# Patient Record
Sex: FEMALE | Race: BLACK OR AFRICAN AMERICAN | Employment: UNEMPLOYED | URBAN - METROPOLITAN AREA
[De-identification: names, ages, dates, MRNs, and addresses within clinical notes are randomized per-mention and may not be internally consistent; named-entity substitution may affect disease eponyms.]

---

## 2018-11-08 ENCOUNTER — APPOINTMENT (OUTPATIENT)
Dept: CT IMAGING | Age: 63
DRG: 026 | End: 2018-11-08
Attending: EMERGENCY MEDICINE
Payer: MEDICARE

## 2018-11-08 ENCOUNTER — ANESTHESIA (OUTPATIENT)
Dept: SURGERY | Age: 63
DRG: 026 | End: 2018-11-08
Payer: MEDICARE

## 2018-11-08 ENCOUNTER — HOSPITAL ENCOUNTER (INPATIENT)
Age: 63
LOS: 18 days | Discharge: NURSING, REHAB OR LONG-TERM CARE | DRG: 026 | End: 2018-11-26
Attending: EMERGENCY MEDICINE | Admitting: FAMILY MEDICINE
Payer: MEDICARE

## 2018-11-08 ENCOUNTER — APPOINTMENT (OUTPATIENT)
Dept: GENERAL RADIOLOGY | Age: 63
DRG: 026 | End: 2018-11-08
Attending: EMERGENCY MEDICINE
Payer: MEDICARE

## 2018-11-08 ENCOUNTER — ANESTHESIA EVENT (OUTPATIENT)
Dept: SURGERY | Age: 63
DRG: 026 | End: 2018-11-08
Payer: MEDICARE

## 2018-11-08 ENCOUNTER — APPOINTMENT (OUTPATIENT)
Dept: GENERAL RADIOLOGY | Age: 63
DRG: 026 | End: 2018-11-08
Attending: NEUROLOGICAL SURGERY
Payer: MEDICARE

## 2018-11-08 DIAGNOSIS — T85.09XA OBSTRUCTED VP SHUNT, INITIAL ENCOUNTER (HCC): ICD-10-CM

## 2018-11-08 DIAGNOSIS — R41.0 CONFUSION: ICD-10-CM

## 2018-11-08 DIAGNOSIS — D49.6 BRAIN TUMOR (HCC): ICD-10-CM

## 2018-11-08 DIAGNOSIS — R26.0 ATAXIC GAIT: ICD-10-CM

## 2018-11-08 DIAGNOSIS — R53.83 FATIGUE, UNSPECIFIED TYPE: ICD-10-CM

## 2018-11-08 DIAGNOSIS — T85.09XS MALFUNCTION OF VENTRICULOPERITONEAL SHUNT, SEQUELA: Primary | ICD-10-CM

## 2018-11-08 DIAGNOSIS — T85.09XA MALFUNCTION OF VENTRICULOPERITONEAL SHUNT, INITIAL ENCOUNTER (HCC): ICD-10-CM

## 2018-11-08 PROBLEM — N17.9 AKI (ACUTE KIDNEY INJURY) (HCC): Status: ACTIVE | Noted: 2018-11-08

## 2018-11-08 PROBLEM — R41.82 ALTERED MENTAL STATUS: Status: ACTIVE | Noted: 2018-11-08

## 2018-11-08 LAB
ALBUMIN SERPL-MCNC: 2.9 G/DL (ref 3.2–4.6)
ALBUMIN/GLOB SERPL: 0.5 {RATIO} (ref 1.2–3.5)
ALP SERPL-CCNC: 147 U/L (ref 50–136)
ALT SERPL-CCNC: 83 U/L (ref 12–65)
ANION GAP SERPL CALC-SCNC: 9 MMOL/L (ref 7–16)
APPEARANCE FLD: CLEAR
AST SERPL-CCNC: 91 U/L (ref 15–37)
ATRIAL RATE: 66 BPM
BASOPHILS # BLD: 0.1 K/UL (ref 0–0.2)
BASOPHILS NFR BLD: 1 % (ref 0–2)
BILIRUB SERPL-MCNC: 0.5 MG/DL (ref 0.2–1.1)
BNP SERPL-MCNC: 16 PG/ML
BUN SERPL-MCNC: 14 MG/DL (ref 8–23)
CALCIUM SERPL-MCNC: 9.2 MG/DL (ref 8.3–10.4)
CALCULATED P AXIS, ECG09: 46 DEGREES
CALCULATED R AXIS, ECG10: 33 DEGREES
CALCULATED T AXIS, ECG11: 34 DEGREES
CHLORIDE SERPL-SCNC: 106 MMOL/L (ref 98–107)
CO2 SERPL-SCNC: 25 MMOL/L (ref 21–32)
COLOR FLD: COLORLESS
CREAT SERPL-MCNC: 1.83 MG/DL (ref 0.6–1)
DIAGNOSIS, 93000: NORMAL
DIFFERENTIAL METHOD BLD: ABNORMAL
EOSINOPHIL # BLD: 0.1 K/UL (ref 0–0.8)
EOSINOPHIL NFR BLD: 1 % (ref 0.5–7.8)
ERYTHROCYTE [DISTWIDTH] IN BLOOD BY AUTOMATED COUNT: 15.6 %
GLOBULIN SER CALC-MCNC: 5.8 G/DL (ref 2.3–3.5)
GLUCOSE CSF-MCNC: 49 MG/DL (ref 40–70)
GLUCOSE SERPL-MCNC: 99 MG/DL (ref 65–100)
HCT VFR BLD AUTO: 30.9 % (ref 35.8–46.3)
HGB BLD-MCNC: 9.8 G/DL (ref 11.7–15.4)
IMM GRANULOCYTES # BLD: 0.1 K/UL (ref 0–0.5)
IMM GRANULOCYTES NFR BLD AUTO: 1 % (ref 0–5)
INR PPP: 1.1
LACTATE BLD-SCNC: 1.13 MMOL/L (ref 0.5–1.9)
LIPASE SERPL-CCNC: 173 U/L (ref 73–393)
LYMPHOCYTES # BLD: 1.1 K/UL (ref 0.5–4.6)
LYMPHOCYTES NFR BLD: 11 % (ref 13–44)
LYMPHOCYTES NFR FLD: 51 %
MCH RBC QN AUTO: 26.6 PG (ref 26.1–32.9)
MCHC RBC AUTO-ENTMCNC: 31.7 G/DL (ref 31.4–35)
MCV RBC AUTO: 84 FL (ref 79.6–97.8)
MONOCYTES # BLD: 0.6 K/UL (ref 0.1–1.3)
MONOCYTES NFR BLD: 6 % (ref 4–12)
MONOS+MACROS NFR FLD: 43 %
NEUTROPHILS NFR FLD: 6 %
NEUTS SEG # BLD: 8.5 K/UL (ref 1.7–8.2)
NEUTS SEG NFR BLD: 82 % (ref 43–78)
NRBC # BLD: 0 K/UL (ref 0–0.2)
NUC CELL # FLD: 16 /CU MM
P-R INTERVAL, ECG05: 172 MS
PLATELET # BLD AUTO: 503 K/UL (ref 150–450)
PMV BLD AUTO: 11.5 FL (ref 9.4–12.3)
POTASSIUM SERPL-SCNC: 4.1 MMOL/L (ref 3.5–5.1)
PROCALCITONIN SERPL-MCNC: <0.1 NG/ML
PROT CSF-MCNC: 16 MG/DL (ref 15–45)
PROT SERPL-MCNC: 8.7 G/DL (ref 6.3–8.2)
PROTHROMBIN TIME: 13.7 SEC (ref 11.5–14.5)
Q-T INTERVAL, ECG07: 406 MS
QRS DURATION, ECG06: 84 MS
QTC CALCULATION (BEZET), ECG08: 425 MS
RBC # BLD AUTO: 3.68 M/UL (ref 4.05–5.2)
RBC # FLD: 43 /CU MM
SODIUM SERPL-SCNC: 140 MMOL/L (ref 136–145)
SPECIMEN SOURCE FLD: NORMAL
TROPONIN I BLD-MCNC: 0.01 NG/ML (ref 0.02–0.05)
TUBE # CSF: 2
TUBE # CSF: 3
VENTRICULAR RATE, ECG03: 66 BPM
WBC # BLD AUTO: 10.4 K/UL (ref 4.3–11.1)

## 2018-11-08 PROCEDURE — 77030020263 HC SOL INJ SOD CL0.9% LFCR 1000ML

## 2018-11-08 PROCEDURE — 87070 CULTURE OTHR SPECIMN AEROBIC: CPT

## 2018-11-08 PROCEDURE — 77030030163 HC BN WAX J&J -A: Performed by: NEUROLOGICAL SURGERY

## 2018-11-08 PROCEDURE — 74011000258 HC RX REV CODE- 258

## 2018-11-08 PROCEDURE — 70360 X-RAY EXAM OF NECK: CPT

## 2018-11-08 PROCEDURE — 76060000036 HC ANESTHESIA 2.5 TO 3 HR: Performed by: NEUROLOGICAL SURGERY

## 2018-11-08 PROCEDURE — 74011250636 HC RX REV CODE- 250/636: Performed by: NEUROLOGICAL SURGERY

## 2018-11-08 PROCEDURE — 74011250636 HC RX REV CODE- 250/636: Performed by: EMERGENCY MEDICINE

## 2018-11-08 PROCEDURE — 77030008477 HC STYL SATN SLP COVD -A: Performed by: ANESTHESIOLOGY

## 2018-11-08 PROCEDURE — 70450 CT HEAD/BRAIN W/O DYE: CPT

## 2018-11-08 PROCEDURE — 80053 COMPREHEN METABOLIC PANEL: CPT

## 2018-11-08 PROCEDURE — 76010000172 HC OR TIME 2.5 TO 3 HR INTENSV-TIER 1: Performed by: NEUROLOGICAL SURGERY

## 2018-11-08 PROCEDURE — 009630Z DRAINAGE OF CEREBRAL VENTRICLE WITH DRAINAGE DEVICE, PERCUTANEOUS APPROACH: ICD-10-PCS | Performed by: NEUROLOGICAL SURGERY

## 2018-11-08 PROCEDURE — 77030039267 HC ADH SKN EXOFIN S2SG -B: Performed by: NEUROLOGICAL SURGERY

## 2018-11-08 PROCEDURE — 77030016570 HC BLNKT BAIR HGGR 3M -B: Performed by: ANESTHESIOLOGY

## 2018-11-08 PROCEDURE — 83605 ASSAY OF LACTIC ACID: CPT

## 2018-11-08 PROCEDURE — 85025 COMPLETE CBC W/AUTO DIFF WBC: CPT

## 2018-11-08 PROCEDURE — 51701 INSERT BLADDER CATHETER: CPT | Performed by: EMERGENCY MEDICINE

## 2018-11-08 PROCEDURE — 83690 ASSAY OF LIPASE: CPT

## 2018-11-08 PROCEDURE — 84484 ASSAY OF TROPONIN QUANT: CPT

## 2018-11-08 PROCEDURE — 74011250636 HC RX REV CODE- 250/636: Performed by: FAMILY MEDICINE

## 2018-11-08 PROCEDURE — 74011250636 HC RX REV CODE- 250/636

## 2018-11-08 PROCEDURE — 77030019908 HC STETH ESOPH SIMS -A: Performed by: ANESTHESIOLOGY

## 2018-11-08 PROCEDURE — C1729 CATH, DRAINAGE: HCPCS

## 2018-11-08 PROCEDURE — 74011000258 HC RX REV CODE- 258: Performed by: NEUROLOGICAL SURGERY

## 2018-11-08 PROCEDURE — 87040 BLOOD CULTURE FOR BACTERIA: CPT

## 2018-11-08 PROCEDURE — 83880 ASSAY OF NATRIURETIC PEPTIDE: CPT

## 2018-11-08 PROCEDURE — 85610 PROTHROMBIN TIME: CPT

## 2018-11-08 PROCEDURE — 77030012393 HC DRN CSF INLC -C: Performed by: NEUROLOGICAL SURGERY

## 2018-11-08 PROCEDURE — 77030020782 HC GWN BAIR PAWS FLX 3M -B: Performed by: ANESTHESIOLOGY

## 2018-11-08 PROCEDURE — 74011000250 HC RX REV CODE- 250: Performed by: NEUROLOGICAL SURGERY

## 2018-11-08 PROCEDURE — 77030025420 HC BUR NEUR TPS STRY -C: Performed by: NEUROLOGICAL SURGERY

## 2018-11-08 PROCEDURE — 77030002916 HC SUT ETHLN J&J -A: Performed by: NEUROLOGICAL SURGERY

## 2018-11-08 PROCEDURE — 77030008703 HC TU ET UNCUF COVD -A: Performed by: ANESTHESIOLOGY

## 2018-11-08 PROCEDURE — 99284 EMERGENCY DEPT VISIT MOD MDM: CPT | Performed by: EMERGENCY MEDICINE

## 2018-11-08 PROCEDURE — 77030008468 HC STPLR SKN VISIS TELE -A: Performed by: NEUROLOGICAL SURGERY

## 2018-11-08 PROCEDURE — 89050 BODY FLUID CELL COUNT: CPT

## 2018-11-08 PROCEDURE — 77030019605

## 2018-11-08 PROCEDURE — 77030039425 HC BLD LARYNG TRULITE DISP TELE -A: Performed by: ANESTHESIOLOGY

## 2018-11-08 PROCEDURE — 76210000063 HC OR PH I REC FIRST 0.5 HR: Performed by: NEUROLOGICAL SURGERY

## 2018-11-08 PROCEDURE — 77030014007 HC SPNG HEMSTAT J&J -B: Performed by: NEUROLOGICAL SURGERY

## 2018-11-08 PROCEDURE — 77030003029 HC SUT VCRL J&J -B: Performed by: NEUROLOGICAL SURGERY

## 2018-11-08 PROCEDURE — 77030038600 HC TU BPLR IRR DISP STRY -B: Performed by: NEUROLOGICAL SURGERY

## 2018-11-08 PROCEDURE — 49082 ABD PARACENTESIS: CPT | Performed by: EMERGENCY MEDICINE

## 2018-11-08 PROCEDURE — C1729 CATH, DRAINAGE: HCPCS | Performed by: NEUROLOGICAL SURGERY

## 2018-11-08 PROCEDURE — 84157 ASSAY OF PROTEIN OTHER: CPT

## 2018-11-08 PROCEDURE — 00P60JZ REMOVAL OF SYNTHETIC SUBSTITUTE FROM CEREBRAL VENTRICLE, OPEN APPROACH: ICD-10-PCS | Performed by: NEUROLOGICAL SURGERY

## 2018-11-08 PROCEDURE — 93005 ELECTROCARDIOGRAM TRACING: CPT | Performed by: EMERGENCY MEDICINE

## 2018-11-08 PROCEDURE — 74011250637 HC RX REV CODE- 250/637: Performed by: FAMILY MEDICINE

## 2018-11-08 PROCEDURE — 82945 GLUCOSE OTHER FLUID: CPT

## 2018-11-08 PROCEDURE — 81003 URINALYSIS AUTO W/O SCOPE: CPT | Performed by: EMERGENCY MEDICINE

## 2018-11-08 PROCEDURE — 77030018673: Performed by: NEUROLOGICAL SURGERY

## 2018-11-08 PROCEDURE — 77030020788: Performed by: NEUROLOGICAL SURGERY

## 2018-11-08 PROCEDURE — 74011000250 HC RX REV CODE- 250

## 2018-11-08 PROCEDURE — 77030018719 HC DRSG PTCH ANTIMIC J&J -A: Performed by: NEUROLOGICAL SURGERY

## 2018-11-08 PROCEDURE — 65620000000 HC RM CCU GENERAL

## 2018-11-08 PROCEDURE — 77030011943

## 2018-11-08 PROCEDURE — 84145 PROCALCITONIN (PCT): CPT

## 2018-11-08 PROCEDURE — 96360 HYDRATION IV INFUSION INIT: CPT | Performed by: EMERGENCY MEDICINE

## 2018-11-08 RX ORDER — DONEPEZIL HYDROCHLORIDE 10 MG/1
10 TABLET, FILM COATED ORAL
COMMUNITY

## 2018-11-08 RX ORDER — SODIUM CHLORIDE, SODIUM LACTATE, POTASSIUM CHLORIDE, CALCIUM CHLORIDE 600; 310; 30; 20 MG/100ML; MG/100ML; MG/100ML; MG/100ML
INJECTION, SOLUTION INTRAVENOUS
Status: DISCONTINUED | OUTPATIENT
Start: 2018-11-08 | End: 2018-11-08 | Stop reason: HOSPADM

## 2018-11-08 RX ORDER — DONEPEZIL HYDROCHLORIDE 5 MG/1
10 TABLET, FILM COATED ORAL
Status: DISCONTINUED | OUTPATIENT
Start: 2018-11-08 | End: 2018-11-26 | Stop reason: HOSPADM

## 2018-11-08 RX ORDER — SODIUM CHLORIDE 0.9 % (FLUSH) 0.9 %
5-10 SYRINGE (ML) INJECTION AS NEEDED
Status: DISCONTINUED | OUTPATIENT
Start: 2018-11-08 | End: 2018-11-26 | Stop reason: HOSPADM

## 2018-11-08 RX ORDER — ROSUVASTATIN CALCIUM 10 MG/1
10 TABLET, COATED ORAL
COMMUNITY

## 2018-11-08 RX ORDER — NEOSTIGMINE METHYLSULFATE 1 MG/ML
INJECTION INTRAVENOUS AS NEEDED
Status: DISCONTINUED | OUTPATIENT
Start: 2018-11-08 | End: 2018-11-08 | Stop reason: HOSPADM

## 2018-11-08 RX ORDER — MULTIVIT WITH MINERALS/HERBS
1 TABLET ORAL DAILY
COMMUNITY

## 2018-11-08 RX ORDER — HYDRALAZINE HYDROCHLORIDE 20 MG/ML
10 INJECTION INTRAMUSCULAR; INTRAVENOUS
Status: DISCONTINUED | OUTPATIENT
Start: 2018-11-08 | End: 2018-11-26 | Stop reason: HOSPADM

## 2018-11-08 RX ORDER — DEXAMETHASONE SODIUM PHOSPHATE 4 MG/ML
INJECTION, SOLUTION INTRA-ARTICULAR; INTRALESIONAL; INTRAMUSCULAR; INTRAVENOUS; SOFT TISSUE AS NEEDED
Status: DISCONTINUED | OUTPATIENT
Start: 2018-11-08 | End: 2018-11-08 | Stop reason: HOSPADM

## 2018-11-08 RX ORDER — MEMANTINE HYDROCHLORIDE 5 MG/1
5 TABLET ORAL 2 TIMES DAILY
COMMUNITY

## 2018-11-08 RX ORDER — VANCOMYCIN/0.9 % SOD CHLORIDE 1.5G/250ML
1500 PLASTIC BAG, INJECTION (ML) INTRAVENOUS EVERY 24 HOURS
Status: DISCONTINUED | OUTPATIENT
Start: 2018-11-09 | End: 2018-11-11

## 2018-11-08 RX ORDER — PROPOFOL 10 MG/ML
INJECTION, EMULSION INTRAVENOUS AS NEEDED
Status: DISCONTINUED | OUTPATIENT
Start: 2018-11-08 | End: 2018-11-08 | Stop reason: HOSPADM

## 2018-11-08 RX ORDER — METRONIDAZOLE 500 MG/100ML
500 INJECTION, SOLUTION INTRAVENOUS ONCE
Status: COMPLETED | OUTPATIENT
Start: 2018-11-08 | End: 2018-11-08

## 2018-11-08 RX ORDER — ROCURONIUM BROMIDE 10 MG/ML
INJECTION, SOLUTION INTRAVENOUS AS NEEDED
Status: DISCONTINUED | OUTPATIENT
Start: 2018-11-08 | End: 2018-11-08 | Stop reason: HOSPADM

## 2018-11-08 RX ORDER — VANCOMYCIN/0.9 % SOD CHLORIDE 1.5G/250ML
1500 PLASTIC BAG, INJECTION (ML) INTRAVENOUS ONCE
Status: COMPLETED | OUTPATIENT
Start: 2018-11-08 | End: 2018-11-08

## 2018-11-08 RX ORDER — FERROUS SULFATE, DRIED 160(50) MG
1 TABLET, EXTENDED RELEASE ORAL
Status: DISCONTINUED | OUTPATIENT
Start: 2018-11-09 | End: 2018-11-26 | Stop reason: HOSPADM

## 2018-11-08 RX ORDER — MORPHINE SULFATE 2 MG/ML
1 INJECTION, SOLUTION INTRAMUSCULAR; INTRAVENOUS
Status: DISCONTINUED | OUTPATIENT
Start: 2018-11-08 | End: 2018-11-26 | Stop reason: HOSPADM

## 2018-11-08 RX ORDER — POTASSIUM CHLORIDE 20 MEQ/1
20 TABLET, EXTENDED RELEASE ORAL 2 TIMES DAILY
Status: DISCONTINUED | OUTPATIENT
Start: 2018-11-08 | End: 2018-11-26 | Stop reason: HOSPADM

## 2018-11-08 RX ORDER — EPHEDRINE SULFATE 50 MG/ML
INJECTION, SOLUTION INTRAVENOUS AS NEEDED
Status: DISCONTINUED | OUTPATIENT
Start: 2018-11-08 | End: 2018-11-08 | Stop reason: HOSPADM

## 2018-11-08 RX ORDER — FOLIC ACID 1 MG/1
0.5 TABLET ORAL DAILY
Status: DISCONTINUED | OUTPATIENT
Start: 2018-11-08 | End: 2018-11-26 | Stop reason: HOSPADM

## 2018-11-08 RX ORDER — FENTANYL CITRATE 50 UG/ML
INJECTION, SOLUTION INTRAMUSCULAR; INTRAVENOUS AS NEEDED
Status: DISCONTINUED | OUTPATIENT
Start: 2018-11-08 | End: 2018-11-08 | Stop reason: HOSPADM

## 2018-11-08 RX ORDER — GLYCOPYRROLATE 0.2 MG/ML
INJECTION INTRAMUSCULAR; INTRAVENOUS AS NEEDED
Status: DISCONTINUED | OUTPATIENT
Start: 2018-11-08 | End: 2018-11-08 | Stop reason: HOSPADM

## 2018-11-08 RX ORDER — LIDOCAINE HYDROCHLORIDE AND EPINEPHRINE 10; 10 MG/ML; UG/ML
INJECTION, SOLUTION INFILTRATION; PERINEURAL AS NEEDED
Status: DISCONTINUED | OUTPATIENT
Start: 2018-11-08 | End: 2018-11-08 | Stop reason: HOSPADM

## 2018-11-08 RX ORDER — METRONIDAZOLE 500 MG/100ML
500 INJECTION, SOLUTION INTRAVENOUS EVERY 8 HOURS
Status: DISCONTINUED | OUTPATIENT
Start: 2018-11-08 | End: 2018-11-09

## 2018-11-08 RX ORDER — SODIUM CHLORIDE 0.9 % (FLUSH) 0.9 %
5-10 SYRINGE (ML) INJECTION EVERY 8 HOURS
Status: DISCONTINUED | OUTPATIENT
Start: 2018-11-08 | End: 2018-11-26 | Stop reason: HOSPADM

## 2018-11-08 RX ORDER — SODIUM CHLORIDE 9 MG/ML
100 INJECTION, SOLUTION INTRAVENOUS CONTINUOUS
Status: DISCONTINUED | OUTPATIENT
Start: 2018-11-08 | End: 2018-11-12

## 2018-11-08 RX ORDER — LANOLIN ALCOHOL/MO/W.PET/CERES
400 CREAM (GRAM) TOPICAL DAILY
Status: DISCONTINUED | OUTPATIENT
Start: 2018-11-08 | End: 2018-11-08

## 2018-11-08 RX ORDER — MEMANTINE HYDROCHLORIDE 5 MG/1
5 TABLET ORAL 2 TIMES DAILY
Status: DISCONTINUED | OUTPATIENT
Start: 2018-11-08 | End: 2018-11-26 | Stop reason: HOSPADM

## 2018-11-08 RX ORDER — LANOLIN ALCOHOL/MO/W.PET/CERES
400 CREAM (GRAM) TOPICAL DAILY
COMMUNITY

## 2018-11-08 RX ORDER — LIDOCAINE HYDROCHLORIDE 20 MG/ML
INJECTION, SOLUTION EPIDURAL; INFILTRATION; INTRACAUDAL; PERINEURAL AS NEEDED
Status: DISCONTINUED | OUTPATIENT
Start: 2018-11-08 | End: 2018-11-08 | Stop reason: HOSPADM

## 2018-11-08 RX ORDER — ROSUVASTATIN CALCIUM 5 MG/1
10 TABLET, COATED ORAL
Status: DISCONTINUED | OUTPATIENT
Start: 2018-11-08 | End: 2018-11-26 | Stop reason: HOSPADM

## 2018-11-08 RX ORDER — BACITRACIN 50000 [IU]/1
INJECTION, POWDER, FOR SOLUTION INTRAMUSCULAR AS NEEDED
Status: DISCONTINUED | OUTPATIENT
Start: 2018-11-08 | End: 2018-11-08 | Stop reason: HOSPADM

## 2018-11-08 RX ORDER — ACETAMINOPHEN 325 MG/1
650 TABLET ORAL
Status: DISCONTINUED | OUTPATIENT
Start: 2018-11-08 | End: 2018-11-26 | Stop reason: HOSPADM

## 2018-11-08 RX ORDER — HYDROCODONE BITARTRATE AND ACETAMINOPHEN 5; 325 MG/1; MG/1
1 TABLET ORAL
Status: DISCONTINUED | OUTPATIENT
Start: 2018-11-08 | End: 2018-11-26 | Stop reason: HOSPADM

## 2018-11-08 RX ORDER — ASPIRIN 81 MG/1
81 TABLET ORAL DAILY
Status: DISCONTINUED | OUTPATIENT
Start: 2018-11-08 | End: 2018-11-08

## 2018-11-08 RX ORDER — FUROSEMIDE 20 MG/1
40 TABLET ORAL DAILY
Status: DISCONTINUED | OUTPATIENT
Start: 2018-11-08 | End: 2018-11-10

## 2018-11-08 RX ORDER — ONDANSETRON 2 MG/ML
INJECTION INTRAMUSCULAR; INTRAVENOUS AS NEEDED
Status: DISCONTINUED | OUTPATIENT
Start: 2018-11-08 | End: 2018-11-08 | Stop reason: HOSPADM

## 2018-11-08 RX ORDER — ONDANSETRON 2 MG/ML
4 INJECTION INTRAMUSCULAR; INTRAVENOUS
Status: DISCONTINUED | OUTPATIENT
Start: 2018-11-08 | End: 2018-11-26 | Stop reason: HOSPADM

## 2018-11-08 RX ORDER — VANCOMYCIN HYDROCHLORIDE 1 G/20ML
INJECTION, POWDER, LYOPHILIZED, FOR SOLUTION INTRAVENOUS AS NEEDED
Status: DISCONTINUED | OUTPATIENT
Start: 2018-11-08 | End: 2018-11-08 | Stop reason: HOSPADM

## 2018-11-08 RX ADMIN — ROCURONIUM BROMIDE 10 MG: 10 INJECTION, SOLUTION INTRAVENOUS at 15:35

## 2018-11-08 RX ADMIN — FOLIC ACID 0.5 MG: 1 TABLET ORAL at 11:30

## 2018-11-08 RX ADMIN — ROCURONIUM BROMIDE 40 MG: 10 INJECTION, SOLUTION INTRAVENOUS at 15:17

## 2018-11-08 RX ADMIN — GLYCOPYRROLATE 0.2 MG: 0.2 INJECTION INTRAMUSCULAR; INTRAVENOUS at 17:27

## 2018-11-08 RX ADMIN — METRONIDAZOLE 500 MG: 500 INJECTION, SOLUTION INTRAVENOUS at 14:53

## 2018-11-08 RX ADMIN — DONEPEZIL HYDROCHLORIDE 10 MG: 5 TABLET, FILM COATED ORAL at 21:03

## 2018-11-08 RX ADMIN — MEMANTINE HYDROCHLORIDE 5 MG: 5 TABLET ORAL at 18:39

## 2018-11-08 RX ADMIN — VANCOMYCIN HYDROCHLORIDE 1500 MG: 10 INJECTION, POWDER, LYOPHILIZED, FOR SOLUTION INTRAVENOUS at 15:45

## 2018-11-08 RX ADMIN — Medication 10 ML: at 23:07

## 2018-11-08 RX ADMIN — ROSUVASTATIN CALCIUM 10 MG: 5 TABLET, FILM COATED ORAL at 21:03

## 2018-11-08 RX ADMIN — NEOSTIGMINE METHYLSULFATE 3 MG: 1 INJECTION INTRAVENOUS at 17:25

## 2018-11-08 RX ADMIN — ONDANSETRON 4 MG: 2 INJECTION INTRAMUSCULAR; INTRAVENOUS at 17:25

## 2018-11-08 RX ADMIN — LIDOCAINE HYDROCHLORIDE 60 MG: 20 INJECTION, SOLUTION EPIDURAL; INFILTRATION; INTRACAUDAL; PERINEURAL at 15:17

## 2018-11-08 RX ADMIN — GLYCOPYRROLATE 0.4 MG: 0.2 INJECTION INTRAMUSCULAR; INTRAVENOUS at 17:25

## 2018-11-08 RX ADMIN — POTASSIUM CHLORIDE 20 MEQ: 20 TABLET, EXTENDED RELEASE ORAL at 11:30

## 2018-11-08 RX ADMIN — ASPIRIN 81 MG: 81 TABLET, COATED ORAL at 11:30

## 2018-11-08 RX ADMIN — HYDRALAZINE HYDROCHLORIDE 10 MG: 20 INJECTION INTRAMUSCULAR; INTRAVENOUS at 20:11

## 2018-11-08 RX ADMIN — DEXAMETHASONE SODIUM PHOSPHATE 4 MG: 4 INJECTION, SOLUTION INTRA-ARTICULAR; INTRALESIONAL; INTRAMUSCULAR; INTRAVENOUS; SOFT TISSUE at 17:25

## 2018-11-08 RX ADMIN — CEFTRIAXONE SODIUM 2 G: 2 INJECTION, POWDER, FOR SOLUTION INTRAMUSCULAR; INTRAVENOUS at 19:32

## 2018-11-08 RX ADMIN — FUROSEMIDE 40 MG: 20 TABLET ORAL at 11:30

## 2018-11-08 RX ADMIN — METRONIDAZOLE 500 MG: 500 INJECTION, SOLUTION INTRAVENOUS at 21:03

## 2018-11-08 RX ADMIN — SODIUM CHLORIDE 1000 ML: 900 INJECTION, SOLUTION INTRAVENOUS at 08:46

## 2018-11-08 RX ADMIN — POTASSIUM CHLORIDE 20 MEQ: 20 TABLET, EXTENDED RELEASE ORAL at 18:39

## 2018-11-08 RX ADMIN — CEFTRIAXONE SODIUM 2 G: 2 INJECTION, POWDER, FOR SOLUTION INTRAMUSCULAR; INTRAVENOUS at 15:07

## 2018-11-08 RX ADMIN — MEMANTINE HYDROCHLORIDE 5 MG: 5 TABLET ORAL at 11:50

## 2018-11-08 RX ADMIN — LEVETIRACETAM 1000 MG: 100 INJECTION, SOLUTION INTRAVENOUS at 17:00

## 2018-11-08 RX ADMIN — SODIUM CHLORIDE 100 ML/HR: 900 INJECTION, SOLUTION INTRAVENOUS at 11:06

## 2018-11-08 RX ADMIN — ROCURONIUM BROMIDE 20 MG: 10 INJECTION, SOLUTION INTRAVENOUS at 15:45

## 2018-11-08 RX ADMIN — NEOSTIGMINE METHYLSULFATE 1 MG: 1 INJECTION INTRAVENOUS at 17:27

## 2018-11-08 RX ADMIN — SODIUM CHLORIDE, SODIUM LACTATE, POTASSIUM CHLORIDE, CALCIUM CHLORIDE: 600; 310; 30; 20 INJECTION, SOLUTION INTRAVENOUS at 15:00

## 2018-11-08 RX ADMIN — PROPOFOL 140 MG: 10 INJECTION, EMULSION INTRAVENOUS at 15:17

## 2018-11-08 RX ADMIN — EPHEDRINE SULFATE 10 MG: 50 INJECTION, SOLUTION INTRAVENOUS at 15:31

## 2018-11-08 RX ADMIN — FENTANYL CITRATE 50 MCG: 50 INJECTION, SOLUTION INTRAMUSCULAR; INTRAVENOUS at 15:52

## 2018-11-08 NOTE — PROGRESS NOTES
Patient arrived to unit and placed on cardiac and respiratory monitors. Patient A&Ox1, oriented to person only.  at bedside and states this is close to patient's baseline. Dual skin assessment performed with Silvana Dunham RN. Skin is warm, dry and intact. Scattered ecchymosis noted across L chest and shoulder, as well as BLE. No skin breakdown or pressure ulcers noted. Kiara care performed and saturated brief removed.

## 2018-11-08 NOTE — PROGRESS NOTES
TRANSFER - IN REPORT: 
 
Verbal report received from [facility], RN (name) on Teressa Whiteside  being received from ER (unit) for routine progression of care Report consisted of patients Situation, Background, Assessment and  
Recommendations(SBAR). Information from the following report(s) SBAR, Kardex, ED Summary, Procedure Summary, Intake/Output, MAR, Recent Results and Cardiac Rhythm NSR was reviewed with the receiving nurse. Opportunity for questions and clarification was provided.

## 2018-11-08 NOTE — PROCEDURES
NEUROSURGERY PROCEDURE NOTE:     Patient Name: Saroj Pond    Patient MRN: [id number]    Pre-Procedure Diagnosis: Ventriculoperitoneal shunt malfunction and hydrocephalus     Post-Procedure Diagnosis: SAME AS ABOVE     Procedure: Left ventriculoperitoneal shunt tap (High Volume)    Surgeon: Keovn Santacruz MD    Anesthesia: None    Estimate Blood Loss: None    Procedure In Detail:   Consent was obtained and a timeout was performed the area over the left  shunt valve was shaved and prepped and draped. The valve reservoir was plated and a 23 g butterfly needle was introduced into the reservoir and 50 cc of CSF was slowly aspirated. The CSF was sent for culture and analysis. The patient tolerated the procedure well without complication. Sd Jules.  [facility]

## 2018-11-08 NOTE — PERIOP NOTES
TRANSFER - IN REPORT: 
 
Verbal report received from [address] [place] [address],[address], RN (name) on Iraida Heath  being received from CCU (unit) for ordered procedure Report consisted of patients Situation, Background, Assessment and  
Recommendations(SBAR). Information from the following report(s) SBAR and MAR was reviewed with the receiving nurse. Opportunity for questions and clarification was provided. Assessment completed upon patients arrival to unit and care assumed. Spouse and niece are at bedside. Spouse requests that niece sign the consents today with his permission.

## 2018-11-08 NOTE — H&P
HOSPITALIST H&P/CONSULTNAME:  Mikaela Ambrosio Age:  59 y.o. 
:   1955 MRN:   [id number] PCP: Karli, MD [place] 
Consulting MD: Treatment Team: Attending Provider: Yusra Joseph MD; Primary Nurse: Roland Morrow RN; Utilization Review: Kira Lopez RN 
HPI:  
Patient is a pleasant 61yoF with PMH significant for prior brain tumor, with  shunts in place, HTN, HLD who presents today to the ER with her  and sister for 3 days of AMS and gait disturbance. History obtained from . She was at her baseline mentation until a few days ago when she became for confused with no short term memory at all. She developed a very wobbly gait. No falls or N/V at home. As symptoms did not improve and  has seen her have these symptoms in the past associated with her shunt malfunctioning, he brought her to the ER for further evaluation. CT head showed evidence of increase size of lateral ventricles. Neurosurgery was consulted by ER and recommended Hospitalist Service to admit. Per ER nurse, Neurosurgery has just left bedside where peritoneal shunt tap was performed with 50cc of clear CSF was removed. Family and nurse report that patient's mentation is already improving by time of my evaluation in the ER. Patient currently denies any complaints. She denies HA, nausea, vomiting, abdominal pain. Complete ROS done and is as stated in HPI or otherwise negative Past Medical History:  
Diagnosis Date  Cancer ([address] brain tumor-surgically removed-all but 5%-2 shunts  High cholesterol  Hypertension  Incontinence of urine   
 sometimes bowel  Memory loss due to medical condition   
 brain tumor/ no short term memory  Morbid obesity ([address]  Nausea & vomiting  Seizures ([address]   
 pt's  stated she had seizures 8 years ago after brain tumor-no treatment now Past Surgical History:  
Procedure Laterality Date  NEUROLOGICAL PROCEDURE UNLISTED    
 brain tumor , shunts-replaced 4 times in the past year d/t bacterial infection Prior to Admission Medications Prescriptions Last Dose Informant Patient Reported? Taking?  
aspirin delayed-release 81 mg tablet 11/7/2018 at Unknown time  Yes Yes Sig: Take 81 mg by mouth daily. Last dose 5/21/14  
b complex vitamins (B COMPLEX 1) tablet 11/7/2018 at Unknown time  Yes Yes Sig: Take 1 Tab by mouth daily. calcium-cholecalciferol, d3, (CALCIUM 600 + D) 600-125 mg-unit Tab 11/7/2018 at Unknown time  Yes Yes Sig: Take  by mouth daily. donepezil (ARICEPT) 10 mg tablet 11/7/2018 at Unknown time  Yes Yes Sig: Take 10 mg by mouth nightly. folic acid 394 mcg tablet 11/7/2018 at Unknown time  Yes Yes Sig: Take 400 mcg by mouth daily. furosemide (LASIX) 20 mg tablet 11/7/2018 at Unknown time  Yes Yes Sig: Take 40 mg by mouth daily. lisinopril-hydrochlorothiazide (PRINZIDE, ZESTORETIC) 20-25 mg per tablet 11/7/2018 at Unknown time  Yes Yes Sig: Take  by mouth every morning. memantine (NAMENDA) 5 mg tablet 11/7/2018 at Unknown time  Yes Yes Sig: Take 5 mg by mouth two (2) times a day. potassium chloride (K-DUR, KLOR-CON) 20 mEq tablet 11/7/2018 at Unknown time  Yes Yes Sig: Take 20 mEq by mouth two (2) times a day. rosuvastatin (CRESTOR) 10 mg tablet 11/7/2018 at Unknown time  Yes Yes Sig: Take 10 mg by mouth nightly. simvastatin (ZOCOR) 40 mg tablet Not Taking at Unknown time  Yes No  
Sig: Take 40 mg by mouth every morning. Take day of surgery per anesthesia protocol. Indications: HYPERCHOLESTEROLEMIA Facility-Administered Medications: None No Known Allergies Social History Tobacco Use  Smoking status: Never Smoker  Smokeless tobacco: Never Used Substance Use Topics  Alcohol use: No  
  
Family History Problem Relation Age of Onset  Malignant Hyperthermia Neg Hx  Pseudocholinesterase Deficiency Neg Hx  Delayed Awakening Neg Hx  Post-op Nausea/Vomiting Neg Hx  Emergence Delirium Neg Hx  Post-op Cognitive Dysfunction Neg Hx   
 Other Neg Hx   
 Heart Attack Mother  Parkinson's Disease Father  Heart Attack Sister Objective:  
 
Visit Vitals /84 (BP 1 Location: Right arm) Pulse 70 Temp 98.1 °F (36.7 °C) Resp 18 Ht 5' 6\" (1.676 m) Wt 106.6 kg (235 lb) SpO2 98% BMI 37.93 kg/m² Temp (24hrs), Av.1 °F (36.7 °C), Min:98.1 °F (36.7 °C), Max:98.1 °F (36.7 °C) Oxygen Therapy O2 Sat (%): 98 % (18 0622) O2 Device: Room air (18 2215) Physical Exam: 
General:    Alert, cooperative, no distress, appears stated age. Head:   Normocephalic, without obvious abnormality, atraumatic. Nose:  Nares normal. No drainage or sinus tenderness. Lungs:   Clear to auscultation bilaterally. No Wheezing or Rhonchi. No rales. Heart:   Regular rate and rhythm,  no murmur, rub or gallop. Abdomen:   Soft, non-tender. Not distended. Bowel sounds normal.  
Extremities: No cyanosis. No edema. No clubbing Skin:     Texture, turgor normal. No rashes or lesions. Not Jaundiced Neurologic: Alert and oriented x 2-3, oriented to person, , location, situation, year. Reported that is was December when asked the month, but did get the year correct. No focal deficits. Data Review:  
Recent Results (from the past 24 hour(s)) CBC WITH AUTOMATED DIFF Collection Time: 18  6:36 AM  
Result Value Ref Range WBC 10.4 4.3 - 11.1 K/uL  
 RBC 3.68 (L) 4.05 - 5.2 M/uL HGB 9.8 (L) 11.7 - 15.4 g/dL HCT 30.9 (L) 35.8 - 46.3 % MCV 84.0 79.6 - 97.8 FL  
 MCH 26.6 26.1 - 32.9 PG  
 MCHC 31.7 31.4 - 35.0 g/dL  
 RDW 15.6 % PLATELET 614 (H) 252 - 450 K/uL MPV 11.5 9.4 - 12.3 FL ABSOLUTE NRBC 0.00 0.0 - 0.2 K/uL  
 DF AUTOMATED NEUTROPHILS 82 (H) 43 - 78 % LYMPHOCYTES 11 (L) 13 - 44 % MONOCYTES 6 4.0 - 12.0 % EOSINOPHILS 1 0.5 - 7.8 %  BASOPHILS 1 0.0 - 2.0 %  
 IMMATURE GRANULOCYTES 1 0.0 - 5.0 %  
 ABS. NEUTROPHILS 8.5 (H) 1.7 - 8.2 K/UL  
 ABS. LYMPHOCYTES 1.1 0.5 - 4.6 K/UL  
 ABS. MONOCYTES 0.6 0.1 - 1.3 K/UL  
 ABS. EOSINOPHILS 0.1 0.0 - 0.8 K/UL  
 ABS. BASOPHILS 0.1 0.0 - 0.2 K/UL  
 ABS. IMM. GRANS. 0.1 0.0 - 0.5 K/UL BNP Collection Time: 11/08/18  6:36 AM  
Result Value Ref Range BNP 16 pg/mL EKG, 12 LEAD, INITIAL Collection Time: 11/08/18  6:41 AM  
Result Value Ref Range Ventricular Rate 66 BPM  
 Atrial Rate 66 BPM  
 P-R Interval 172 ms QRS Duration 84 ms Q-T Interval 406 ms QTC Calculation (Bezet) 425 ms Calculated P Axis 46 degrees Calculated R Axis 33 degrees Calculated T Axis 34 degrees Diagnosis    
  !! AGE AND GENDER SPECIFIC ECG ANALYSIS !! Normal sinus rhythm Normal ECG When compared with ECG of 12-DEC-2013 01:47, No significant change was found Confirmed by TATIANA BARKER (), [facility] ([id number]) on 11/8/2018 1:59:29 AM 
  
METABOLIC PANEL, COMPREHENSIVE Collection Time: 11/08/18  7:09 AM  
Result Value Ref Range Sodium 140 136 - 145 mmol/L Potassium 4.1 3.5 - 5.1 mmol/L Chloride 106 98 - 107 mmol/L  
 CO2 25 21 - 32 mmol/L Anion gap 9 7 - 16 mmol/L Glucose 99 65 - 100 mg/dL BUN 14 8 - 23 MG/DL Creatinine 1.83 (H) 0.6 - 1.0 MG/DL  
 GFR est AA 36 (L) >60 ml/min/1.73m2 GFR est non-AA 30 (L) >60 ml/min/1.73m2 Calcium 9.2 8.3 - 10.4 MG/DL Bilirubin, total 0.5 0.2 - 1.1 MG/DL  
 ALT (SGPT) 83 (H) 12 - 65 U/L  
 AST (SGOT) 91 (H) 15 - 37 U/L Alk. phosphatase 147 (H) 50 - 136 U/L Protein, total 8.7 (H) 6.3 - 8.2 g/dL Albumin 2.9 (L) 3.2 - 4.6 g/dL Globulin 5.8 (H) 2.3 - 3.5 g/dL A-G Ratio 0.5 (L) 1.2 - 3.5 POC TROPONIN-I Collection Time: 11/08/18  8:14 AM  
Result Value Ref Range Troponin-I (POC) 0.01 (L) 0.02 - 0.05 ng/ml POC LACTIC ACID Collection Time: 11/08/18  8:16 AM  
Result Value Ref Range Lactic Acid (POC) 1.13 0.5 - 1.9 mmol/L  
LIPASE Collection Time: 11/08/18  8:19 AM  
Result Value Ref Range Lipase 173 73 - 393 U/L  
PROCALCITONIN Collection Time: 11/08/18  8:19 AM  
Result Value Ref Range Procalcitonin <0.1 ng/mL PROTHROMBIN TIME + INR Collection Time: 11/08/18  8:19 AM  
Result Value Ref Range Prothrombin time 13.7 11.5 - 14.5 sec INR 1.1 PROTEIN, CSF Collection Time: 11/08/18  9:12 AM  
Result Value Ref Range Tube No. 2    
 Protein,CSF 16 15 - 45 MG/DL  
GLUCOSE, CSF Collection Time: 11/08/18  9:12 AM  
Result Value Ref Range Tube No. 3    
 Glucose,CSF 49 40 - 70 MG/DL Imaging Jalen Sola Francis Assessment and Plan: Active Hospital Problems Diagnosis Date Noted  MORIS (acute kidney injury) ([facility] 11/08/2018  Acute encephalopathy 12/12/2013  Malfunction of ventriculoperitoneal shunt ([facility] 06/09/2013 Class: Acute  Hypertension 06/09/2013 Class: Chronic PLAN Acute Encephalopathy - Likely 2/2 malfunction of  shunt - Already improving after shunt tap at bedside by Neurosurgery in ER. 
- Will admit to ICU for q1h neuro checks per their recommendations - Further plan per Neurosurgery, appreciate their consultation MORIS 
- Mild - Will start IVFs - Recheck BMP in AM 
 
HTN 
- BPs currently stable - Will hold lisinopril given MORIS 
- PRN hydralazine for BP control Code Status: full Anticipated discharge: pending clinical course. Inpatient. Signed By: Igor Brooke MD   
 November 8, 2018

## 2018-11-08 NOTE — ED PROVIDER NOTES
Patient admitted 12/12/2013 for  shunt infection and replacement by Dr. Yandel Yuen. Note follows. Admit date: 12/12/2013 
  
Discharge Date: 12/30/2013   
  
Admitting Physician: Rashaun Flynn MD  
  
Discharge Physician: Paulina Yuen MD 
  
Admission Diagnoses: Acute encephalopathy  SHUNT MALFUNCTION 
  
Last Procedure: Procedure(s): 
 SHUNT EXTERNALIZATION 
SHUNT VENTRICULAR-PERITONEAL REPLACEMENT 
PICC PLACEMENT 
  
Discharge Diagnoses: Principal Problem: 
  Infection of  (ventriculoperitoneal) shunt (12/13/2013) Patient confusion and weakness at that time. Family reports for the past couple of days patient has had confusion and weakness and they're concerned about her  shunt. She is also had some balance issues. They deny any vomiting or diarrhea. No cough. Symptoms getting worse so came in today. Altered mental status Associated symptoms include confusion and weakness. Past Medical History:  
Diagnosis Date  Cancer ([address] brain tumor-surgically removed-all but 5%-2 shunts  High cholesterol  Hypertension  Incontinence of urine   
 sometimes bowel  Memory loss due to medical condition   
 brain tumor/ no short term memory  Morbid obesity ([address]  Nausea & vomiting  Seizures ([address]   
 pt's  stated she had seizures 8 years ago after brain tumor-no treatment now Past Surgical History:  
Procedure Laterality Date  NEUROLOGICAL PROCEDURE UNLISTED    
 brain tumor , shunts-replaced 4 times in the past year d/t bacterial infection Family History:  
Problem Relation Age of Onset  Malignant Hyperthermia Neg Hx  Pseudocholinesterase Deficiency Neg Hx  Delayed Awakening Neg Hx  Post-op Nausea/Vomiting Neg Hx  Emergence Delirium Neg Hx  Post-op Cognitive Dysfunction Neg Hx   
 Other Neg Hx   
 Heart Attack Mother  Parkinson's Disease Father  Heart Attack Sister Social History Socioeconomic History  Marital status:  Spouse name: Not on file  Number of children: Not on file  Years of education: Not on file  Highest education level: Not on file Social Needs  Financial resource strain: Not on file  Food insecurity - worry: Not on file  Food insecurity - inability: Not on file  Transportation needs - medical: Not on file  Transportation needs - non-medical: Not on file Occupational History  Not on file Tobacco Use  Smoking status: Never Smoker  Smokeless tobacco: Never Used Substance and Sexual Activity  Alcohol use: No  
 Drug use: No  
 Sexual activity: No  
Other Topics Concern  Not on file Social History Narrative  Not on file ALLERGIES: Patient has no known allergies. Review of Systems Unable to perform ROS: Mental status change Constitutional: Positive for fatigue. Neurological: Positive for weakness. Psychiatric/Behavioral: Positive for confusion. Vitals:  
 11/08/18 1419 BP: 129/84 Pulse: 70 Resp: 18 Temp: 98.1 °F (36.7 °C) SpO2: 98% Weight: 106.6 kg (235 lb) Height: 5' 6\" (1.676 m) Physical Exam  
Constitutional: She appears well-developed and well-nourished. HENT:  
Head: Normocephalic and atraumatic. Eyes: EOM are normal. Pupils are equal, round, and reactive to light. Neck: Normal range of motion. Neck supple. Cardiovascular: Normal rate and regular rhythm. Pulmonary/Chest: Effort normal and breath sounds normal.  
Abdominal: Soft. Bowel sounds are normal. There is no tenderness. Musculoskeletal: Normal range of motion. She exhibits no edema. Neurological: She is alert. No cranial nerve deficit. Skin: Skin is warm and dry. MDM Number of Diagnoses or Management Options Ataxic gait:  
Confusion:  
Fatigue, unspecified type: Obstructed  shunt, initial encounter [facility]):  
 Diagnosis management comments: Patient with confusion and unsteady gait with increased weakness for the past 3 days. CT shows enlargement of the lateral ventricles with  shunt in place. Will admit for further treatment. Discussed patient with Dr. Damir Hanson neurosurgery and he will consult on patient and requests hospitalist to admit. Amount and/or Complexity of Data Reviewed Clinical lab tests: ordered and reviewed Tests in the radiology section of CPT®: ordered and reviewed Tests in the medicine section of CPT®: ordered and reviewed Patient Progress Patient progress: stable Procedures EKG: normal sinus rhythm, nonspecific ST and T waves changes. Rate 66. UA neg. XR SHUNT SERIES (Final result) Result time 11/08/18 07:48:45 Final result by Domenica Monroy MD (11/08/18 07:48:45) Narrative: SHUNT SERIES 3 view(s). HISTORY: Altered mental status and abnormal gait with mild ventriculomegaly. TECHNIQUE: AP and lateral views of the skull, AP view of the chest and abdomen. Mickeal Rink COMPARISON: December 2013. FINDINGS: There are bilateral ventriculostomy catheters. Catheter overlying the 
chest and abdomen unremarkable. It is in a different portion of the abdomen when 
compared to the prior exam which is normal. Otherwise no definite change from 
prior exam. 
 
 
  
  
  
  
   
   
CT HEAD WO CONT (Final result) Result time 11/08/18 07:28:17 Final result by Domenica Monroy MD (11/08/18 97:92:66) Impression:  
 IMPRESSION: Increased size of the lateral ventricles since prior exam as 
detailed above. Narrative:  
 CT HEAD WITHOUT CONTRAST. INDICATION: Altered mental status and weakness x3 days with unsteady gait.  
 
COMPARISON: 25 December 2013.   
 
TECHNIQUE:   5 mm axial scans from the skull base to the vertex.  Our CT 
scanners use one or more of the following:  Automated exposure control, 
 adjustment of the mA and or kV according to patient size, iterative 
reconstruction. FINDINGS:  No acute intraparenchymal hemorrhage or abnormal extra-axial fluid 
collection. There are bilateral frontal ventriculostomy catheters. The lateral 
ventricles are enlarged. Small amount of gas is present within the ventricles, 
similar to before. Anterior horns the lateral ventricles now measuring 4.0 cm in 
transverse diameter compared to 34 mm on the prior exam. Included portion of the 
paranasal sinuses and orbits grossly unremarkable. Surgical defect in the 
occipital bone.  
  
  
  
   
 
Results Include: 
 
Recent Results (from the past 24 hour(s)) CBC WITH AUTOMATED DIFF Collection Time: 11/08/18  6:36 AM  
Result Value Ref Range WBC 10.4 4.3 - 11.1 K/uL  
 RBC 3.68 (L) 4.05 - 5.2 M/uL HGB 9.8 (L) 11.7 - 15.4 g/dL HCT 30.9 (L) 35.8 - 46.3 % MCV 84.0 79.6 - 97.8 FL  
 MCH 26.6 26.1 - 32.9 PG  
 MCHC 31.7 31.4 - 35.0 g/dL  
 RDW 15.6 % PLATELET 509 (H) 559 - 450 K/uL MPV 11.5 9.4 - 12.3 FL ABSOLUTE NRBC 0.00 0.0 - 0.2 K/uL  
 DF AUTOMATED NEUTROPHILS 82 (H) 43 - 78 % LYMPHOCYTES 11 (L) 13 - 44 % MONOCYTES 6 4.0 - 12.0 % EOSINOPHILS 1 0.5 - 7.8 % BASOPHILS 1 0.0 - 2.0 % IMMATURE GRANULOCYTES 1 0.0 - 5.0 %  
 ABS. NEUTROPHILS 8.5 (H) 1.7 - 8.2 K/UL  
 ABS. LYMPHOCYTES 1.1 0.5 - 4.6 K/UL  
 ABS. MONOCYTES 0.6 0.1 - 1.3 K/UL  
 ABS. EOSINOPHILS 0.1 0.0 - 0.8 K/UL  
 ABS. BASOPHILS 0.1 0.0 - 0.2 K/UL  
 ABS. IMM. GRANS. 0.1 0.0 - 0.5 K/UL EKG, 12 LEAD, INITIAL Collection Time: 11/08/18  6:41 AM  
Result Value Ref Range Ventricular Rate 66 BPM  
 Atrial Rate 66 BPM  
 P-R Interval 172 ms QRS Duration 84 ms Q-T Interval 406 ms QTC Calculation (Bezet) 425 ms Calculated P Axis 46 degrees Calculated R Axis 33 degrees Calculated T Axis 34 degrees Diagnosis    
  !! AGE AND GENDER SPECIFIC ECG ANALYSIS !! Normal sinus rhythm Normal ECG 
 When compared with ECG of 12-DEC-2013 01:47, No significant change was found Confirmed by TATIANA BARKER (), [place] ([id number]) on 11/8/2018 2:27:88 AM 
  
METABOLIC PANEL, COMPREHENSIVE Collection Time: 11/08/18  7:09 AM  
Result Value Ref Range Sodium 140 136 - 145 mmol/L Potassium 4.1 3.5 - 5.1 mmol/L Chloride 106 98 - 107 mmol/L  
 CO2 25 21 - 32 mmol/L Anion gap 9 7 - 16 mmol/L Glucose 99 65 - 100 mg/dL BUN 14 8 - 23 MG/DL Creatinine 1.83 (H) 0.6 - 1.0 MG/DL  
 GFR est AA 36 (L) >60 ml/min/1.73m2 GFR est non-AA 30 (L) >60 ml/min/1.73m2 Calcium 9.2 8.3 - 10.4 MG/DL Bilirubin, total 0.5 0.2 - 1.1 MG/DL  
 ALT (SGPT) 83 (H) 12 - 65 U/L  
 AST (SGOT) 91 (H) 15 - 37 U/L Alk. phosphatase 147 (H) 50 - 136 U/L Protein, total 8.7 (H) 6.3 - 8.2 g/dL Albumin 2.9 (L) 3.2 - 4.6 g/dL Globulin 5.8 (H) 2.3 - 3.5 g/dL A-G Ratio 0.5 (L) 1.2 - 3.5 POC TROPONIN-I Collection Time: 11/08/18  8:14 AM  
Result Value Ref Range Troponin-I (POC) 0.01 (L) 0.02 - 0.05 ng/ml POC LACTIC ACID Collection Time: 11/08/18  8:16 AM  
Result Value Ref Range  Lactic Acid (POC) 1.13 0.5 - 1.9 mmol/L

## 2018-11-08 NOTE — PERIOP NOTES
TRANSFER - OUT REPORT: 
 
Verbal report given to [address] RN on [place]  being transferred to [facility] for routine post - op Report consisted of patients Situation, Background, Assessment and  
Recommendations(SBAR). Information from the following report(s) SBAR, OR Summary, Procedure Summary, Intake/Output and MAR was reviewed with the receiving nurse. Lines:  
Peripheral IV 11/08/18 Left; Lower Forearm (Active) Site Assessment Clean, dry, & intact 11/8/2018  6:04 PM  
Phlebitis Assessment 0 11/8/2018  6:04 PM  
Infiltration Assessment 0 11/8/2018  6:04 PM  
Dressing Status Clean, dry, & intact; Occlusive 11/8/2018  6:04 PM  
Dressing Type Transparent;Tape 11/8/2018  6:04 PM  
Hub Color/Line Status Pink; Infusing 11/8/2018  6:04 PM  
Alcohol Cap Used No 11/8/2018  6:04 PM  
   
Peripheral IV 11/08/18 Left; Inferior Forearm (Active) Site Assessment Clean, dry, & intact 11/8/2018  6:04 PM  
Phlebitis Assessment 0 11/8/2018  6:04 PM  
Infiltration Assessment 0 11/8/2018  6:04 PM  
Dressing Status Clean, dry, & intact; Occlusive 11/8/2018  6:04 PM  
Dressing Type Transparent;Tape 11/8/2018  6:04 PM  
Hub Color/Line Status Blue; Infusing 11/8/2018  6:04 PM  
Alcohol Cap Used No 11/8/2018  6:04 PM  
  
 
Opportunity for questions and clarification was provided. Patient transported with: 
 Monitor O2 @ 4 liters Registered Nurse VTE prophylaxis orders have been written for [place]. Patient and family given floor number and nurses name. Family updated re: pt status after security code verified.

## 2018-11-08 NOTE — ANESTHESIA PREPROCEDURE EVALUATION
Anesthetic History PONV Review of Systems / Medical History Patient summary reviewed, nursing notes reviewed and pertinent labs reviewed Pulmonary Within defined limits Neuro/Psych  
 
seizures: well controlled Comments: Pt with hydrocephalus s/p brain tumor excision with persistent  shunt with multiple revisions. Has short term memory loss at baseline. Cardiovascular Hypertension: well controlled Exercise tolerance: >4 METS Comments: Denies recent CP, SOB, Palps, Syncope, Fatigue Denies cardiac history GI/Hepatic/Renal 
Within defined limits Endo/Other Morbid obesity Other Findings Physical Exam 
 
Airway Mallampati: II 
TM Distance: 4 - 6 cm Neck ROM: normal range of motion Mouth opening: Normal 
 
 Cardiovascular Regular rate and rhythm,  S1 and S2 normal,  no murmur, click, rub, or gallop Rhythm: regular Rate: normal 
 
 
 
 Dental 
 
Dentition: Poor dentition Pulmonary Breath sounds clear to auscultation Abdominal 
GI exam deferred Other Findings Anesthetic Plan ASA: 3 Anesthesia type: general 
 
 
 
 
Induction: Intravenous Anesthetic plan and risks discussed with: Patient and Family

## 2018-11-08 NOTE — H&P
NEUROSURGERY CONSULT NOTE:  
 
CC: Altered mental status somnolence a history of a  shunt HPI:  
Jay Jay Crocker y.o. female with a PMH of multiple medical comorbidities including multiple ventriculoperitoneal shunt revisions a history of a brain tumor resection who presents to [address] [place] [address] with 3 days worth of altered mental status and gait disturbance her family. History was obtained secondary to her family. She in the emergency room appears somnolent and obtunded. She has had a CT head without contrast that demonstrates increased ventricular size in comparison to a prior failure on CT scan performed in 2013. The CT head also demonstrates bifrontal pneumocephalus concerning for a gas-forming organism as a cause of the  shunt infection. Past Medical History:  
Diagnosis Date  Acute encephalopathy  Brain tumor ([address]  Cancer ([address] brain tumor-surgically removed-all but 5%-2 shunts  High cholesterol  Hypertension  Incontinence of urine   
 sometimes bowel  Infection of  (ventriculoperitoneal) shunt (HCC)  Infection of  (ventriculoperitoneal) shunt (HCC)  Malfunction of ventriculoperitoneal shunt ([address]  Memory loss due to medical condition   
 brain tumor/ no short term memory  Morbid obesity ([address]  Nausea & vomiting  Obstructive hydrocephalus  Seizures ([address]   
 pt's  stated she had seizures 8 years ago after brain tumor-no treatment now  UTI (lower urinary tract infection) Past Surgical History:  
Procedure Laterality Date  NEUROLOGICAL PROCEDURE UNLISTED    
 brain tumor , shunts-replaced 4 times in the past year d/t bacterial infection No Known Allergies Social History Socioeconomic History  Marital status:  Spouse name: Not on file  Number of children: Not on file  Years of education: Not on file  Highest education level: Not on file Social Needs  Financial resource strain: Not on file  Food insecurity - worry: Not on file  Food insecurity - inability: Not on file  Transportation needs - medical: Not on file  Transportation needs - non-medical: Not on file Occupational History  Not on file Tobacco Use  Smoking status: Never Smoker  Smokeless tobacco: Never Used Substance and Sexual Activity  Alcohol use: No  
 Drug use: No  
 Sexual activity: No  
Other Topics Concern  Not on file Social History Narrative  Not on file Review of Systems - History obtained from unobtainable from patient due to mental status Physical Exam:  
Visit Vitals /77 (BP 1 Location: Right arm, BP Patient Position: At rest) Pulse 63 Temp 97.8 °F (36.6 °C) Resp 15 Ht 5' 6\" (1.676 m) Wt 224 lb (101.6 kg) SpO2 100% BMI 36.15 kg/m² Somnolent eyes closed Eyes open to stim Pupils equal round reactive to light Oriented to name only Not following commands in the bilateral upper extremities bilateral lower extremities Will localize with the bilateral upper extremities and withdrawal of the bilateral lower extremities Does not cooperate with formal cranial nerve assessment Heart regular rate and rhythm No increased work of breathing Left convexity shunt valve palpable with sluggish refill Right shunt palpable unable to determine if the shunts are tender as the patient is somnolent and obtunded at this time. Exam limited to comatose obtunded state Imaging: I independently reviewed and interpreted the CT head without contrast that demonstrates increased ventricular size in comparison to a prior failure on CT scan performed in 2013. The CT head also demonstrates bifrontal pneumocephalus concerning for a gas-forming organism as a cause of the  shunt infection. There bifrontal ventricular catheters terminating in the foramen of Grace in place.   Shunt series shows only migration of the abdominal catheter but no no significant change in comparison to previous exams. The right ventiruclar catheter is abandoned and is in place but has not distal catheter. Assessment and Plan:  
Pratibha Wells 59 y.o. female who presented with altered mental status and obtundation who has clinical and radiographic evidence of  shunt malfunction. I performed an emergent left  shunt tap and aspirated 50 cc of CSF at which time she perked up and was following commands. I sent the CSF for analysis and recommend she go to the OR today for removal of her  shunts and placement of an external ventricular drain. Regardless of the laboratory studies I feel the low virulence infection from a gas-forming organism such a P. Acnes. I have discussed the risks and benefits with the patient's family who agrees to proceed with surgery. - OR for bilateral shunt removal (i.e. Abandoned right ventricular catheter and left ventriculoperitoneal shunt) and left external ventricular drain placement - Will treat with meningitic dosing of vancomycin, rocephin, and flagyl Benjaman Blazer. [facility]

## 2018-11-08 NOTE — ED NOTES
Dr. Karin Arriaga came to bedside and performed left ventricular peritoneal shunt tap, sterile, pulled off 50cc of clear CSF. 4 labeled tubes with CSF were walked down to lab by this RN and handed directly to . Pt tolerated procedure well, mentation greatly improved from beginning to end of procedure. Whole procedure took 10 minutes to perform and timeout was performed prior. Consent form signed by  and placed on pt chart. Pt to go to ICU for q hr neuro checks

## 2018-11-08 NOTE — ANESTHESIA POSTPROCEDURE EVALUATION
Procedure(s): SHUNT VENTRICULAR-PERITONEAL REVISION. Anesthesia Post Evaluation Patient location during evaluation: PACU Patient participation: complete - patient participated Level of consciousness: awake Pain management: satisfactory to patient Airway patency: patent Anesthetic complications: no 
Cardiovascular status: hemodynamically stable Respiratory status: spontaneous ventilation Hydration status: euvolemic Post anesthesia nausea and vomiting:  none Visit Vitals /79 Pulse 74 Temp 36.2 °C (97.2 °F) Resp 16 Ht 5' 6\" (1.676 m) Wt 101.6 kg (224 lb) SpO2 98% BMI 36.15 kg/m²

## 2018-11-08 NOTE — ED NOTES
TRANSFER - OUT REPORT: 
 
Verbal report given to JEFF Padilla(name) on Rachle June  being transferred to [facility](unit) for routine progression of care Report consisted of patients Situation, Background, Assessment and  
Recommendations(SBAR). Information from the following report(s) SBAR, Kardex, ED Summary, Procedure Summary, MAR and Accordion was reviewed with the receiving nurse. Lines:  
Peripheral IV 11/08/18 Left; Lower Forearm (Active) Site Assessment Clean, dry, & intact 11/8/2018  8:10 AM  
Phlebitis Assessment 0 11/8/2018  8:10 AM  
Infiltration Assessment 0 11/8/2018  8:10 AM  
Dressing Status Clean, dry, & intact 11/8/2018  8:10 AM  
   
Peripheral IV 11/08/18 Left; Inferior Forearm (Active) Site Assessment Clean, dry, & intact 11/8/2018  8:40 AM  
Phlebitis Assessment 0 11/8/2018  8:40 AM  
Infiltration Assessment 0 11/8/2018  8:40 AM  
Dressing Status Clean, dry, & intact 11/8/2018  8:40 AM  
Dressing Type Tape;Transparent 11/8/2018  8:40 AM  
Hub Color/Line Status Blue;Capped 11/8/2018  8:40 AM  
  
 
Opportunity for questions and clarification was provided. Patient transported with: 
 Registered Nurse

## 2018-11-08 NOTE — OP NOTES
NEUROSURGERY OPERATIVE REPORT:     Patient Name: Elvis Bernal    Patient MRN: [id number]    Date of Procedure: 11/8/2018    Pre-Procedure Diagnosis: Ventriculoperitoneal shunt malfunction/infection     Post-Procedure Diagnosis: SAME AS ABOVE     Procedure:   1. Removal of Left Ventriculoperitoneal Shunt  2. Placement of a Left external Ventricular Drain   3. Removal of Right Ventricular catheter    Surgeon: Kelly Mejia MD    Surgical Staff:   Circ-1: Sandeep Alvarado RN  Circ-2: Sussy Barros RN  Radiology Technician: Anthony Acevedo RT, R  Scrub Tech-1: Alonso Mcdaniel  Scrub Tech-2: Tanisha Montez  Scrub Tech-3: Bryant Rehman    Anesthesia: General Endotracheal Anesthesia     Estimated Blood Loss: Less than 50 cc     Specimen: None     Procedure In Detail:   Patient was brought to the OR and placed under general endotracheal anesthesia and time out was performed. She was placed on the OR table in the supine position. A shoulder bump was placed on the left side. The left side of the head was shaved overlying the left cranial shunt incisions. The left head, neck, chest, and abdomen was prepped and draped in a sterile fashion. The previous old incision overlying the left clavicle was identified. The incision was opened and explored and the distal shunt catheter was found and pulled back from the abdomen and a distal intact catheter tip was visualized. The posterior auricular shunt incision was then opened and explored and the valve and catheter was found and then rest of the distal catheter was removed. The left cranial incision was opened and the left ventricular catheter was disconnected from the valve and the ventricular catheter was removed. A left external ventricular drain was placed along the length of the previous ventricular catheter tract and clear CSF flow was obtained. The EVD was then tunneled through a separate stab incision posteriorly.  The wounds were irrigated with copious bacitracin anti-biotic irrigation. The subcutaneous tissue and galea were then closed with interrupted 3-0 Vicryl sutures. The incision overlying the clavicle had staples on skin. The posterior auricular incision and the left frontal incision were closed with 4-0 nylon suture. The EVD was securred with nylon stay sutures and a biopatch was placed at the exit site. Sterile dressing were applied to the aforementioned incisions. The drapes were then removed. The head turned to the left exposing the right cranial incision. The area over the right cranial incision was shaved and prepped and draped in sterile fashion. The right cranial incision was then incised and explored and the right ventricular catheter was then removed. The wounds were irrigated with copious bacitracin anti-biotic irrigation. The subcutaneous tissue and galea were then closed with interrupted 3-0 Vicryl sutures. The right cranial incision was then closed with a running 4-0 nylon suture. The counts were correct at the end of the case. Disposition: PACU from the PACU to the ICU, EVD to be leveled at 10 cmH2O above the tragus.      Bette Wills MD

## 2018-11-08 NOTE — PROGRESS NOTES
TRANSFER - IN REPORT: 
 
Verbal report received from Martine Will [facility] (name) on Ardath Orts  being received from PACU (unit) for routine post - op Report consisted of patients Situation, Background, Assessment and  
Recommendations(SBAR). Information from the following report(s) SBAR, Kardex, OR Summary, Procedure Summary, Intake/Output, MAR, Recent Results, Cardiac Rhythm NSR and Alarm Parameters  was reviewed with the receiving nurse. Opportunity for questions and clarification was provided.

## 2018-11-09 LAB
ANION GAP SERPL CALC-SCNC: 11 MMOL/L (ref 7–16)
BASOPHILS # BLD: 0 K/UL (ref 0–0.2)
BASOPHILS NFR BLD: 0 % (ref 0–2)
BUN SERPL-MCNC: 10 MG/DL (ref 8–23)
CALCIUM SERPL-MCNC: 9 MG/DL (ref 8.3–10.4)
CHLORIDE SERPL-SCNC: 109 MMOL/L (ref 98–107)
CO2 SERPL-SCNC: 24 MMOL/L (ref 21–32)
CREAT SERPL-MCNC: 1.5 MG/DL (ref 0.6–1)
DIFFERENTIAL METHOD BLD: ABNORMAL
EOSINOPHIL # BLD: 0 K/UL (ref 0–0.8)
EOSINOPHIL NFR BLD: 0 % (ref 0.5–7.8)
ERYTHROCYTE [DISTWIDTH] IN BLOOD BY AUTOMATED COUNT: 15 %
GLUCOSE SERPL-MCNC: 104 MG/DL (ref 65–100)
HCT VFR BLD AUTO: 30.3 % (ref 35.8–46.3)
HGB BLD-MCNC: 9.3 G/DL (ref 11.7–15.4)
IMM GRANULOCYTES # BLD: 0.1 K/UL (ref 0–0.5)
IMM GRANULOCYTES NFR BLD AUTO: 0 % (ref 0–5)
LACTATE CSF-SCNC: 30 MG/DL (ref 10–22)
LYMPHOCYTES # BLD: 0.7 K/UL (ref 0.5–4.6)
LYMPHOCYTES NFR BLD: 6 % (ref 13–44)
MCH RBC QN AUTO: 26.6 PG (ref 26.1–32.9)
MCHC RBC AUTO-ENTMCNC: 30.7 G/DL (ref 31.4–35)
MCV RBC AUTO: 86.6 FL (ref 79.6–97.8)
MONOCYTES # BLD: 0.4 K/UL (ref 0.1–1.3)
MONOCYTES NFR BLD: 3 % (ref 4–12)
NEUTS SEG # BLD: 10.2 K/UL (ref 1.7–8.2)
NEUTS SEG NFR BLD: 90 % (ref 43–78)
NRBC # BLD: 0 K/UL (ref 0–0.2)
PLATELET # BLD AUTO: 447 K/UL (ref 150–450)
PMV BLD AUTO: 11.3 FL (ref 9.4–12.3)
POTASSIUM SERPL-SCNC: 4 MMOL/L (ref 3.5–5.1)
RBC # BLD AUTO: 3.5 M/UL (ref 4.05–5.2)
SODIUM SERPL-SCNC: 144 MMOL/L (ref 136–145)
WBC # BLD AUTO: 11.3 K/UL (ref 4.3–11.1)

## 2018-11-09 PROCEDURE — 74011250637 HC RX REV CODE- 250/637: Performed by: FAMILY MEDICINE

## 2018-11-09 PROCEDURE — 77030020263 HC SOL INJ SOD CL0.9% LFCR 1000ML

## 2018-11-09 PROCEDURE — 77030013794 HC KT TRNSDUC BLD EDWD -B

## 2018-11-09 PROCEDURE — 74011000258 HC RX REV CODE- 258: Performed by: NEUROLOGICAL SURGERY

## 2018-11-09 PROCEDURE — 74011000302 HC RX REV CODE- 302: Performed by: FAMILY MEDICINE

## 2018-11-09 PROCEDURE — 80048 BASIC METABOLIC PNL TOTAL CA: CPT

## 2018-11-09 PROCEDURE — 86580 TB INTRADERMAL TEST: CPT | Performed by: FAMILY MEDICINE

## 2018-11-09 PROCEDURE — 74011250636 HC RX REV CODE- 250/636: Performed by: FAMILY MEDICINE

## 2018-11-09 PROCEDURE — 74011250636 HC RX REV CODE- 250/636: Performed by: NEUROLOGICAL SURGERY

## 2018-11-09 PROCEDURE — 85025 COMPLETE CBC W/AUTO DIFF WBC: CPT

## 2018-11-09 PROCEDURE — 65620000000 HC RM CCU GENERAL

## 2018-11-09 PROCEDURE — 36415 COLL VENOUS BLD VENIPUNCTURE: CPT

## 2018-11-09 RX ORDER — METRONIDAZOLE 500 MG/100ML
500 INJECTION, SOLUTION INTRAVENOUS EVERY 6 HOURS
Status: DISCONTINUED | OUTPATIENT
Start: 2018-11-09 | End: 2018-11-20

## 2018-11-09 RX ADMIN — SODIUM CHLORIDE 100 ML/HR: 900 INJECTION, SOLUTION INTRAVENOUS at 11:39

## 2018-11-09 RX ADMIN — Medication 10 ML: at 05:14

## 2018-11-09 RX ADMIN — MEMANTINE HYDROCHLORIDE 5 MG: 5 TABLET ORAL at 18:32

## 2018-11-09 RX ADMIN — CEFTRIAXONE SODIUM 2 G: 2 INJECTION, POWDER, FOR SOLUTION INTRAMUSCULAR; INTRAVENOUS at 20:10

## 2018-11-09 RX ADMIN — MEMANTINE HYDROCHLORIDE 5 MG: 5 TABLET ORAL at 09:12

## 2018-11-09 RX ADMIN — POTASSIUM CHLORIDE 20 MEQ: 20 TABLET, EXTENDED RELEASE ORAL at 18:32

## 2018-11-09 RX ADMIN — ROSUVASTATIN CALCIUM 10 MG: 5 TABLET, FILM COATED ORAL at 22:30

## 2018-11-09 RX ADMIN — Medication 10 ML: at 13:16

## 2018-11-09 RX ADMIN — METRONIDAZOLE 500 MG: 500 INJECTION, SOLUTION INTRAVENOUS at 05:14

## 2018-11-09 RX ADMIN — FUROSEMIDE 40 MG: 20 TABLET ORAL at 09:12

## 2018-11-09 RX ADMIN — CEFTRIAXONE SODIUM 2 G: 2 INJECTION, POWDER, FOR SOLUTION INTRAMUSCULAR; INTRAVENOUS at 07:12

## 2018-11-09 RX ADMIN — FOLIC ACID 0.5 MG: 1 TABLET ORAL at 09:13

## 2018-11-09 RX ADMIN — POTASSIUM CHLORIDE 20 MEQ: 20 TABLET, EXTENDED RELEASE ORAL at 09:13

## 2018-11-09 RX ADMIN — TUBERCULIN PURIFIED PROTEIN DERIVATIVE 5 UNITS: 5 INJECTION, SOLUTION INTRADERMAL at 16:08

## 2018-11-09 RX ADMIN — SODIUM CHLORIDE 100 ML/HR: 900 INJECTION, SOLUTION INTRAVENOUS at 02:06

## 2018-11-09 RX ADMIN — Medication 10 ML: at 22:30

## 2018-11-09 RX ADMIN — METRONIDAZOLE 500 MG: 500 INJECTION, SOLUTION INTRAVENOUS at 11:35

## 2018-11-09 RX ADMIN — VANCOMYCIN HYDROCHLORIDE 1500 MG: 10 INJECTION, POWDER, LYOPHILIZED, FOR SOLUTION INTRAVENOUS at 16:08

## 2018-11-09 RX ADMIN — CALCIUM CARBONATE 500 MG (1,250 MG)-VITAMIN D3 200 UNIT TABLET 1 TABLET: at 07:12

## 2018-11-09 RX ADMIN — METRONIDAZOLE 500 MG: 500 INJECTION, SOLUTION INTRAVENOUS at 18:32

## 2018-11-09 RX ADMIN — DONEPEZIL HYDROCHLORIDE 10 MG: 5 TABLET, FILM COATED ORAL at 22:29

## 2018-11-09 NOTE — PROGRESS NOTES
Pharmacokinetic Consult to Pharmacist 
 
Jimenarosalio Ambrosio is a 59 y.o. 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 Height: 5' 6\" (167.6 cm)  Weight: 101.6 kg (224 lb) Lab Results Component Value Date/Time BUN 14 11/08/2018 07:09 AM  
 Creatinine 1.83 (H) 11/08/2018 07:09 AM  
 WBC 10.4 11/08/2018 06:36 AM  
 Procalcitonin <0.1 11/08/2018 08:19 AM  
 Lactic acid 1.8 12/12/2013 05:10 AM  
 Lactic Acid (POC) 1.13 11/08/2018 08:16 AM  
  
Estimated Creatinine Clearance: 38.3 mL/min (A) (based on SCr of 1.83 mg/dL (H)). CULTURES: 
pending Day 1 of vancomycin. Goal trough is 15-20. Vancomycin dose initiated at 1500 mg X 1 preop, then 1500 mg Q24H for now due to renal function. Will consider early trough depending on trend in renal function. Will continue to follow patient. Thank you, 
Bebeto Hector, PharmD Clinical Pharmacist 
[phone]

## 2018-11-09 NOTE — PROGRESS NOTES
Hospitalist Progress Note   
2018 Admit Date: 2018  6:44 AM  
NAME: Nathan Chapman :  1955 MRN:  [id number] Attending: Rexann Hatchet, MD 
PCP:  Karli, MD Montrell 
 
SUBJECTIVE:  
 
59 y.o. female with a PMH of multiple medical comorbidities including multiple ventriculoperitoneal shunt revisions a history of a brain tumor resection who presents to [address] [place] [address] with 3 days worth of altered mental status and gait disturbance her family. CT head without contrast that demonstrates increased ventricular size, as well as bifrontal pneumocephalus concerning for a gas-forming organism as a cause of the  shunt infection. Shunt tapped by NS and pt ultimately taken to OR for shunt removal on . On broad spectrum abx. Pt improved this AM. Eating breakfast. Denies HA, CP, SOB. Review of Systems negative with exception of pertinent positives noted above PHYSICAL EXAM  
 
Visit Vitals /63 Pulse 85 Temp 98.3 °F (36.8 °C) Resp 21 Ht 5' 6\" (1.676 m) Wt 108 kg (238 lb 1.6 oz) SpO2 98% BMI 38.43 kg/m² Temp (24hrs), Av.7 °F (36.5 °C), Min:97 °F (36.1 °C), Max:98.5 °F (36.9 °C) Oxygen Therapy O2 Sat (%): 98 % (18 1517) Pulse via Oximetry: 85 beats per minute (18 1517) O2 Device: Room air (18 1131) O2 Flow Rate (L/min): 2 l/min (18 2215) Intake/Output Summary (Last 24 hours) at 2018 1524 Last data filed at 2018 1502 Gross per 24 hour Intake 2410.33 ml Output 1506 ml Net 904.33 ml General: No acute distress   
Neck:  No LAD Lungs:  CTA Bilaterally Heart:  Regular rate and rhythm,  No murmur, rub, or gallop Abdomen: Soft, Non distended, Non tender, Positive bowel sounds Extremities: No cyanosis, clubbing or edema Neurologic:  No focal deficits Psych:  Calm, cooperative ASSESSMENT Active Hospital Problems Diagnosis Date Noted  MORIS (acute kidney injury) ([facility] 2018  Acute encephalopathy 12/12/2013  Malfunction of ventriculoperitoneal shunt ([facility] 06/09/2013 Class: Acute  Hypertension 06/09/2013 Class: Chronic Ventriculoperitoneal shunt malfunction/infection S/p removal of left ventriculoperitoneal shunt and right ventricular catheter and placement of a right external ventricular drain on 11/8 
- on abx per NS 
- further mgmt per NS MORIS Cr 1.8 on admit, down to 1.5 
- cont IVFs 
- follow BMP 
 
HTN 
-130s 
- holding lisinopril given MORIS 
- PRN hydralazine for BP control Proph - SCDs Full code Dispo - per NS Signed By: Katty Chamberlain MD   
 November 9, 2018

## 2018-11-09 NOTE — PROGRESS NOTES
CSF drainage from ventriculostomy 20 ml in the past hour.  Drain clamped and patient sat up straight to eat breakfast. Will unclamp drain and return HOB to 30 degrees after she finishes eating breakfast.

## 2018-11-09 NOTE — PROGRESS NOTES
NEUROSURGERY PROGRESS NOTE:  
Admit Date: 11/8/2018 Subjective: No acute overnight events. The patient is doing well this am. She is sitting upright eating lunch and is much more awake and alert in comparison to her pre-surgery exam.  
 
Objective: 
Visit Vitals /59 Pulse 83 Temp 98.3 °F (36.8 °C) Resp 19 Ht 5' 6\" (1.676 m) Wt 238 lb 1.6 oz (108 kg) SpO2 97% BMI 38.43 kg/m² General: No acute distress Awake, alert, and oriented to person, thought she was at [facility], not president, oriented to year Eyes open spontaneously PERRL, EOMI with slight exotropia of the left eye at baseline visual fields grossly intact to light and confrontation Slight nystagmus present Hearing grossly intact Face symmetric and tongue mid-line on protrusion No pronation or drift on exam  
Patient with 5/5 strength in the bilateral upper and bilateral lower extremities Sensation intact to light touch and pin-prick Incisions: Clean, dry, and intact with dressing in place, left EVD exit site with biopatch in place with 165 cc output since placement. Assessment and Plan:  
Penney Dakin 59 y.o. female who is now 1 Day Post-Op from removal of her left ventriculoperitoneal shunt and right ventricular catheter and placement of a right external ventricular drain for treatment of ventriculoperitoneal shunt malfunction/infection. 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 Will continue to treat with IV anti-biotics for at least 7 to 10 days and monitor culture data. - Continue ICU level care - Can do q2H neuro checks - Continue SCDs bilaterally for DVT Prophylaxis - Continue Diet  
- Contiue EVD at 10 cmH2O call if drain and measure ICP q1H 
- Will monitor culture will likely send new CSF culture on Monday - Please call with questions or concerns Marcos Fong. Gillespie Addison, [facility]

## 2018-11-09 NOTE — PROGRESS NOTES
No output from EVD so far, also unable to transduce ICP waveform. There are some air bubbles in the tubing connecting from the patient to the drain. Dr. Zach Reid notified, drain lowered momentarily per MD order to check patency. Drain is patent. Orders to continue draining at current level. Patient is alert.

## 2018-11-09 NOTE — PROGRESS NOTES
Pt returned to room 3308 s/p surgery. Pt is drowsy and A&Ox1, oriented to self. VSS. Incision sites are clean, dry, intact. HOB 30 deg and locked. Ventricular drain leveled and clamped until 1842 per Dr. Clarice Wood.

## 2018-11-09 NOTE — PROGRESS NOTES
Verbal bedside report received from Mark Vila. Dual neuro assessment completed. Shift assessment completed. Patient is alert. Oriented to self only. Memory loss. Follows commands. Focuses and tracks with eyes, pupils equal. Denies pain or headache. EVD level at 10 ml H20 above the tragus. Fluid in tubing in clear, no measurable output yet. NSR on monitor. Lung sounds diminished, 02 sat 100% on 4 L NC, regular unlabored respirations. Abdomen semi-soft, active bowel sounds. Voiding via external catheter. SCDs in place. Family at bedside and updated on plan of care. Lines: 
#20 L wrist 
#22 L FA Drips: 
NS @ 100 ml/hr Drains: 
L sided EVD Purewick external catheter

## 2018-11-09 NOTE — PROGRESS NOTES
Problem: Falls - Risk of 
Goal: *Absence of Falls Document Dannial Shadow Fall Risk and appropriate interventions in the flowsheet. Outcome: Progressing Towards Goal 
Fall Risk Interventions: 
Mobility Interventions: Bed/chair exit alarm, Communicate number of staff needed for ambulation/transfer, Patient to call before getting OOB, Strengthening exercises (ROM-active/passive) Mentation Interventions: Adequate sleep, hydration, pain control, Bed/chair exit alarm, Door open when patient unattended, Evaluate medications/consider consulting pharmacy, Family/sitter at bedside, Increase mobility, More frequent rounding, Reorient patient, Toileting rounds, Update white board Medication Interventions: Bed/chair exit alarm, Evaluate medications/consider consulting pharmacy Elimination Interventions: Bed/chair exit alarm, Call light in reach, Elevated toilet seat, Patient to call for help with toileting needs, Toileting schedule/hourly rounds Problem: Pressure Injury - Risk of 
Goal: *Prevention of pressure injury Document Waylon Scale and appropriate interventions in the flowsheet. Outcome: Progressing Towards Goal 
Pressure Injury Interventions: 
Sensory Interventions: Assess changes in LOC, Assess need for specialty bed, Avoid rigorous massage over bony prominences, Check visual cues for pain, Float heels, Keep linens dry and wrinkle-free, Maintain/enhance activity level, Minimize linen layers, Monitor skin under medical devices, Pad between skin to skin, Pressure redistribution bed/mattress (bed type), Suspension boots, Turn and reposition approx. every two hours (pillows and wedges if needed), Use 30-degree side-lying position Moisture Interventions: Absorbent underpads, Apply protective barrier, creams and emollients, Assess need for specialty bed, Check for incontinence Q2 hours and as needed, Contain wound drainage, Internal/External urinary devices, Limit adult briefs, Maintain skin hydration (lotion/cream), Minimize layers, Moisture barrier Activity Interventions: Assess need for specialty bed, Pressure redistribution bed/mattress(bed type) Mobility Interventions: Assess need for specialty bed, Float heels, HOB 30 degrees or less, Pressure redistribution bed/mattress (bed type), Turn and reposition approx. every two hours(pillow and wedges) Nutrition Interventions: Document food/fluid/supplement intake, Offer support with meals,snacks and hydration Friction and Shear Interventions: Apply protective barrier, creams and emollients, Feet elevated on foot rest, Foam dressings/transparent film/skin sealants, HOB 30 degrees or less, Lift sheet, Minimize layers, Transferring/repositioning devices

## 2018-11-09 NOTE — PROGRESS NOTES
LEAPFROG PROTOCOL NOTE Devendra Hernandez 11/8/2018 The patient is currently in the critical care setting managed by Dr. Gabo Martinez with  shunt infection. The patient's chart is reviewed and the patient is discussed with the staff. Patient is currently hemodynamically stable. Patient has no needs identified for Intensivist management in the critical care setting at this time. Please notify us if can be of assistance. No charge billed to the patient. Thank you.  
 
Jessica Cai MD

## 2018-11-09 NOTE — PROGRESS NOTES
Attempted to see, pt s/p crani. No family at bedside. CM to follow and assist with d/c POC. PPD for any potential rehab needs.  PT/OT when medically stable per MD.

## 2018-11-09 NOTE — PROGRESS NOTES
Patient has finished eating breakfast, HOB returned to 30 degrees. Ventric drain leveled and zeroed and then unclamped to continue draining.

## 2018-11-10 ENCOUNTER — APPOINTMENT (OUTPATIENT)
Dept: ULTRASOUND IMAGING | Age: 63
DRG: 026 | End: 2018-11-10
Attending: INTERNAL MEDICINE
Payer: MEDICARE

## 2018-11-10 LAB
ANION GAP SERPL CALC-SCNC: 7 MMOL/L (ref 7–16)
APPEARANCE UR: CLEAR
BASOPHILS # BLD: 0 K/UL (ref 0–0.2)
BASOPHILS NFR BLD: 0 % (ref 0–2)
BILIRUB UR QL: NEGATIVE
BUN SERPL-MCNC: 18 MG/DL (ref 8–23)
CALCIUM SERPL-MCNC: 8.2 MG/DL (ref 8.3–10.4)
CHLORIDE SERPL-SCNC: 110 MMOL/L (ref 98–107)
CK SERPL-CCNC: 58 U/L (ref 21–215)
CO2 SERPL-SCNC: 25 MMOL/L (ref 21–32)
COLOR UR: YELLOW
CREAT SERPL-MCNC: 1.68 MG/DL (ref 0.6–1)
DIFFERENTIAL METHOD BLD: ABNORMAL
EOSINOPHIL # BLD: 0.1 K/UL (ref 0–0.8)
EOSINOPHIL NFR BLD: 1 % (ref 0.5–7.8)
ERYTHROCYTE [DISTWIDTH] IN BLOOD BY AUTOMATED COUNT: 15.3 %
GLUCOSE SERPL-MCNC: 89 MG/DL (ref 65–100)
GLUCOSE UR STRIP.AUTO-MCNC: NEGATIVE MG/DL
HCT VFR BLD AUTO: 26.6 % (ref 35.8–46.3)
HGB BLD-MCNC: 8.2 G/DL (ref 11.7–15.4)
HGB UR QL STRIP: NEGATIVE
IMM GRANULOCYTES # BLD: 0.1 K/UL (ref 0–0.5)
IMM GRANULOCYTES NFR BLD AUTO: 1 % (ref 0–5)
KETONES UR QL STRIP.AUTO: NEGATIVE MG/DL
LEUKOCYTE ESTERASE UR QL STRIP.AUTO: NEGATIVE
LYMPHOCYTES # BLD: 1.3 K/UL (ref 0.5–4.6)
LYMPHOCYTES NFR BLD: 13 % (ref 13–44)
MCH RBC QN AUTO: 26.3 PG (ref 26.1–32.9)
MCHC RBC AUTO-ENTMCNC: 30.8 G/DL (ref 31.4–35)
MCV RBC AUTO: 85.3 FL (ref 79.6–97.8)
MM INDURATION POC: 0 MM (ref 0–5)
MONOCYTES # BLD: 0.6 K/UL (ref 0.1–1.3)
MONOCYTES NFR BLD: 6 % (ref 4–12)
NEUTS SEG # BLD: 8 K/UL (ref 1.7–8.2)
NEUTS SEG NFR BLD: 79 % (ref 43–78)
NITRITE UR QL STRIP.AUTO: NEGATIVE
NRBC # BLD: 0 K/UL (ref 0–0.2)
PH UR STRIP: 7 [PH] (ref 5–9)
PLATELET # BLD AUTO: 403 K/UL (ref 150–450)
PMV BLD AUTO: 11.3 FL (ref 9.4–12.3)
POTASSIUM SERPL-SCNC: 3.7 MMOL/L (ref 3.5–5.1)
PPD POC: NEGATIVE NEGATIVE
PROT UR STRIP-MCNC: NEGATIVE MG/DL
RBC # BLD AUTO: 3.12 M/UL (ref 4.05–5.2)
SODIUM SERPL-SCNC: 142 MMOL/L (ref 136–145)
SP GR UR REFRACTOMETRY: 1.01 (ref 1–1.02)
UROBILINOGEN UR QL STRIP.AUTO: 0.2 EU/DL (ref 0.2–1)
WBC # BLD AUTO: 10.1 K/UL (ref 4.3–11.1)

## 2018-11-10 PROCEDURE — 77030020263 HC SOL INJ SOD CL0.9% LFCR 1000ML

## 2018-11-10 PROCEDURE — 74011250636 HC RX REV CODE- 250/636: Performed by: NEUROLOGICAL SURGERY

## 2018-11-10 PROCEDURE — 74011000258 HC RX REV CODE- 258: Performed by: NEUROLOGICAL SURGERY

## 2018-11-10 PROCEDURE — 65620000000 HC RM CCU GENERAL

## 2018-11-10 PROCEDURE — 74011250636 HC RX REV CODE- 250/636: Performed by: FAMILY MEDICINE

## 2018-11-10 PROCEDURE — 36415 COLL VENOUS BLD VENIPUNCTURE: CPT

## 2018-11-10 PROCEDURE — 82550 ASSAY OF CK (CPK): CPT

## 2018-11-10 PROCEDURE — 80048 BASIC METABOLIC PNL TOTAL CA: CPT

## 2018-11-10 PROCEDURE — 76775 US EXAM ABDO BACK WALL LIM: CPT

## 2018-11-10 PROCEDURE — 85025 COMPLETE CBC W/AUTO DIFF WBC: CPT

## 2018-11-10 PROCEDURE — 81003 URINALYSIS AUTO W/O SCOPE: CPT

## 2018-11-10 PROCEDURE — 74011250637 HC RX REV CODE- 250/637: Performed by: FAMILY MEDICINE

## 2018-11-10 RX ADMIN — FOLIC ACID 0.5 MG: 1 TABLET ORAL at 09:44

## 2018-11-10 RX ADMIN — CALCIUM CARBONATE 500 MG (1,250 MG)-VITAMIN D3 200 UNIT TABLET 1 TABLET: at 07:48

## 2018-11-10 RX ADMIN — POTASSIUM CHLORIDE 20 MEQ: 20 TABLET, EXTENDED RELEASE ORAL at 18:20

## 2018-11-10 RX ADMIN — METRONIDAZOLE 500 MG: 500 INJECTION, SOLUTION INTRAVENOUS at 18:20

## 2018-11-10 RX ADMIN — METRONIDAZOLE 500 MG: 500 INJECTION, SOLUTION INTRAVENOUS at 01:30

## 2018-11-10 RX ADMIN — ROSUVASTATIN CALCIUM 10 MG: 5 TABLET, FILM COATED ORAL at 21:53

## 2018-11-10 RX ADMIN — Medication 10 ML: at 14:46

## 2018-11-10 RX ADMIN — METRONIDAZOLE 500 MG: 500 INJECTION, SOLUTION INTRAVENOUS at 23:39

## 2018-11-10 RX ADMIN — VANCOMYCIN HYDROCHLORIDE 1500 MG: 10 INJECTION, POWDER, LYOPHILIZED, FOR SOLUTION INTRAVENOUS at 16:08

## 2018-11-10 RX ADMIN — SODIUM CHLORIDE 100 ML/HR: 900 INJECTION, SOLUTION INTRAVENOUS at 02:25

## 2018-11-10 RX ADMIN — HYDRALAZINE HYDROCHLORIDE 10 MG: 20 INJECTION INTRAMUSCULAR; INTRAVENOUS at 23:39

## 2018-11-10 RX ADMIN — METRONIDAZOLE 500 MG: 500 INJECTION, SOLUTION INTRAVENOUS at 06:05

## 2018-11-10 RX ADMIN — HYDRALAZINE HYDROCHLORIDE 10 MG: 20 INJECTION INTRAMUSCULAR; INTRAVENOUS at 03:54

## 2018-11-10 RX ADMIN — METRONIDAZOLE 500 MG: 500 INJECTION, SOLUTION INTRAVENOUS at 12:12

## 2018-11-10 RX ADMIN — SODIUM CHLORIDE 100 ML/HR: 900 INJECTION, SOLUTION INTRAVENOUS at 20:30

## 2018-11-10 RX ADMIN — Medication 10 ML: at 21:54

## 2018-11-10 RX ADMIN — HYDRALAZINE HYDROCHLORIDE 10 MG: 20 INJECTION INTRAMUSCULAR; INTRAVENOUS at 12:35

## 2018-11-10 RX ADMIN — POTASSIUM CHLORIDE 20 MEQ: 20 TABLET, EXTENDED RELEASE ORAL at 09:44

## 2018-11-10 RX ADMIN — Medication 10 ML: at 06:05

## 2018-11-10 RX ADMIN — MEMANTINE HYDROCHLORIDE 5 MG: 5 TABLET ORAL at 18:20

## 2018-11-10 RX ADMIN — DONEPEZIL HYDROCHLORIDE 10 MG: 5 TABLET, FILM COATED ORAL at 21:53

## 2018-11-10 RX ADMIN — CEFTRIAXONE SODIUM 2 G: 2 INJECTION, POWDER, FOR SOLUTION INTRAMUSCULAR; INTRAVENOUS at 07:46

## 2018-11-10 RX ADMIN — MEMANTINE HYDROCHLORIDE 5 MG: 5 TABLET ORAL at 09:44

## 2018-11-10 RX ADMIN — CEFTRIAXONE SODIUM 2 G: 2 INJECTION, POWDER, FOR SOLUTION INTRAMUSCULAR; INTRAVENOUS at 19:16

## 2018-11-10 NOTE — PROGRESS NOTES
20cc output from EVD in 45 min, drain clamped at 0445 AM, will leave clamped for one hour per orders. Patient bathed and turned with drain clamped. Neuro status stable. ICP 3. Will continue to monitor.

## 2018-11-10 NOTE — PROGRESS NOTES
Admit Date: 2018 Subjective: No complaints, no events overnight Objective:  
 
Patient Vitals for the past 8 hrs: 
 BP Temp Pulse Resp SpO2 Weight 11/10/18 0932 139/67  67 19 96 %   
11/10/18 0902 163/68  78 25 98 %   
11/10/18 0802 169/72  71 22 96 %   
11/10/18 0732 137/65  66 20 98 %   
11/10/18 0701 146/66 98.7 °F (37.1 °C) 65 20 98 %   
11/10/18 0633 160/75  67 20 97 %   
11/10/18 0605      104.9 kg (231 lb 4.2 oz)  
11/10/18 0532 148/72  67 23 97 %   
11/10/18 0501 145/68  75 29 98 %   
11/10/18 0432 153/72  74 23 96 %   
11/10/18 0351  98.5 °F (36.9 °C)      
11/10/18 0332 155/75  63 19 100 %   
11/10/18 0301 151/72  62 16 99 %   
11/10/18 0232 151/73  70 23 98 %  Temp (24hrs), Av.6 °F (37 °C), Min:98.3 °F (36.8 °C), Max:99.2 °F (37.3 °C) AAOx4 
OES PERRL 
CN grossly intact SF, voice clear Neck supple Respirations non-labored Regular heart rate MAEW 
5/5 strength BUE/BLE 
SILT Incision: c/d/i 
EVD open @ 10 cm H2O, ICP 8-10 at bedside Output: 246 cc / 24 hours Data Review Recent Results (from the past 24 hour(s)) METABOLIC PANEL, BASIC Collection Time: 11/10/18  3:22 AM  
Result Value Ref Range Sodium 142 136 - 145 mmol/L Potassium 3.7 3.5 - 5.1 mmol/L Chloride 110 (H) 98 - 107 mmol/L  
 CO2 25 21 - 32 mmol/L Anion gap 7 7 - 16 mmol/L Glucose 89 65 - 100 mg/dL BUN 18 8 - 23 MG/DL Creatinine 1.68 (H) 0.6 - 1.0 MG/DL  
 GFR est AA 40 (L) >60 ml/min/1.73m2 GFR est non-AA 33 (L) >60 ml/min/1.73m2 Calcium 8.2 (L) 8.3 - 10.4 MG/DL  
CBC WITH AUTOMATED DIFF Collection Time: 11/10/18  3:22 AM  
Result Value Ref Range WBC 10.1 4.3 - 11.1 K/uL  
 RBC 3.12 (L) 4.05 - 5.2 M/uL HGB 8.2 (L) 11.7 - 15.4 g/dL HCT 26.6 (L) 35.8 - 46.3 % MCV 85.3 79.6 - 97.8 FL  
 MCH 26.3 26.1 - 32.9 PG  
 MCHC 30.8 (L) 31.4 - 35.0 g/dL  
 RDW 15.3 % PLATELET 171 565 - 832 K/uL  MPV 11.3 9.4 - 12.3 FL  
 ABSOLUTE NRBC 0.00 0.0 - 0.2 K/uL  
 DF AUTOMATED NEUTROPHILS 79 (H) 43 - 78 % LYMPHOCYTES 13 13 - 44 % MONOCYTES 6 4.0 - 12.0 % EOSINOPHILS 1 0.5 - 7.8 % BASOPHILS 0 0.0 - 2.0 % IMMATURE GRANULOCYTES 1 0.0 - 5.0 %  
 ABS. NEUTROPHILS 8.0 1.7 - 8.2 K/UL  
 ABS. LYMPHOCYTES 1.3 0.5 - 4.6 K/UL  
 ABS. MONOCYTES 0.6 0.1 - 1.3 K/UL  
 ABS. EOSINOPHILS 0.1 0.0 - 0.8 K/UL  
 ABS. BASOPHILS 0.0 0.0 - 0.2 K/UL  
 ABS. IMM. GRANS. 0.1 0.0 - 0.5 K/UL  
CK Collection Time: 11/10/18  3:22 AM  
Result Value Ref Range CK 58 21 - 215 U/L  
  
 
images and reports reviewed Assessment:    
59 y.o. female Principal Problem: 
  Acute encephalopathy (12/12/2013) Active Problems: 
  Malfunction of ventriculoperitoneal shunt ([facility] (6/9/2013) Hypertension (6/9/2013) MORIS (acute kidney injury) ([facility] (11/8/2018) Plan:  
- Continue EVD @ 10 cm, working well - Cultures NGTD 
- Repeat CSF on Monday Vanita Olivares MD, PhD 
Neurosurgery

## 2018-11-10 NOTE — PROGRESS NOTES
Bedside shift change report given to Prema Izquierdo RN (oncoming nurse) by Vicenta Gatica RN (offgoing nurse). Report included the following information SBAR, Kardex, ED Summary, OR Summary, Procedure Summary, Intake/Output, MAR, Recent Results and Cardiac Rhythm NSR. Dual neuro assessment performed

## 2018-11-10 NOTE — PROGRESS NOTES
Patient is calm with loving family at bedside Encouraged family with presence and assurance of continued prayers Patient nodded her head that she heard chaplain Desmond Salgado, staff Shivani gavin [address], [phone] [facility]  /   [email or web address]

## 2018-11-10 NOTE — PROGRESS NOTES
Shift report receivwed from [facility]. Dual neuro assessment performed. Patient is drowsy, opens eyes to voice, follows commands, oriented to person, place, and situation but states year ats 2016. Short term memory loss, repeats same questions within 30 sec to 1 min. Generalized weakness of extremities, no deficits noted. EVD drain c/d/i to left head, dressing c/d/i to forehead. Drain with serous output, leveled, ICP 9. HOB locked at 30 degrees, bed low, alarm on. Will continue to monitor.

## 2018-11-10 NOTE — PROGRESS NOTES
Nutrition Received BPA for EN/PN PTA. Pt with no noted central IV access or FT access which would required for TF or PN administration PTA. Therefore this does not meet criteria for referral. 
F/U per nutrition standard of care. Marco A Grayson, [address], [address] [place] [address]

## 2018-11-10 NOTE — PROGRESS NOTES
Bedside shift change report given to Rebeca Randall RN (oncoming nurse) by JEFF Montano (offgoing nurse). Report included the following information SBAR, Kardex, ED Summary, OR Summary, Procedure Summary, Intake/Output, MAR, Recent Results and Cardiac Rhythm NSR. Dual neuro assessment performed. Patient's EVD put out 25 ml over the past hour, ICP=8, drain clamped for the next hour per order.

## 2018-11-10 NOTE — PROGRESS NOTES
Hospitalist Progress Note 11/10/2018 Admit Date: 2018  6:44 AM  
NAME: Saroj Pond :  1955 MRN:  [id number] Attending: Ramez Joseph MD 
PCP:  Montrell Calvillo MD 
 
SUBJECTIVE:  
 
59 y.o. female with a PMH of multiple medical comorbidities including multiple ventriculoperitoneal shunt revisions a history of a brain tumor resection who presents to [address] [place] [address] with 3 days worth of altered mental status and gait disturbance her family. CT head without contrast that demonstrates increased ventricular size, as well as bifrontal pneumocephalus concerning for a gas-forming organism as a cause of the  shunt infection. Shunt tapped by NS and pt ultimately taken to OR for shunt removal on . On broad spectrum abx. No complaints. Denies CP, SOB, N/V. Review of Systems negative with exception of pertinent positives noted above PHYSICAL EXAM  
 
Visit Vitals /76 Pulse 70 Temp 98.7 °F (37.1 °C) Resp 13 Ht 5' 6\" (1.676 m) Wt 104.9 kg (231 lb 4.2 oz) SpO2 99% BMI 37.33 kg/m² Temp (24hrs), Av.7 °F (37.1 °C), Min:98.5 °F (36.9 °C), Max:99.2 °F (37.3 °C) Oxygen Therapy O2 Sat (%): 99 % (11/10/18 1101) Pulse via Oximetry: 70 beats per minute (11/10/18 1101) O2 Device: Room air (11/10/18 0701) O2 Flow Rate (L/min): 2 l/min (18 2215) Intake/Output Summary (Last 24 hours) at 11/10/2018 1232 Last data filed at 11/10/2018 1101 Gross per 24 hour Intake 3639.33 ml Output 1979 ml Net 1660.33 ml General: No acute distress   
Neck:  No LAD Lungs:  CTA Bilaterally Heart:  Regular rate and rhythm,  No murmur, rub, or gallop Abdomen: Soft, Non distended, Non tender, Positive bowel sounds Extremities: No cyanosis, clubbing or edema Neurologic:  No focal deficits Psych:  Calm, cooperative ASSESSMENT Active Hospital Problems Diagnosis Date Noted  MORIS (acute kidney injury) ([facility] 2018  Acute encephalopathy 12/12/2013  Malfunction of ventriculoperitoneal shunt ([facility] 06/09/2013 Class: Acute  Hypertension 06/09/2013 Class: Chronic Ventriculoperitoneal shunt malfunction/infection S/p removal of left ventriculoperitoneal shunt and right ventricular catheter and placement of a right external ventricular drain on 11/8 
- on abx per NS 
- further mgmt per NS MORIS Cr 1.8 on admit, now relatively stable 1.5-1.6. 
- cont IVFs 
- follow BMP 
- UA negative - check CK 
- renal US 
- holding lisinopril HTN 
-130s 
- holding lisinopril given MORIS 
- PRN hydralazine for BP control Proph - SCDs Full code Dispo - per NS Signed By: Janie Linda MD   
 November 10, 2018

## 2018-11-11 LAB
ANION GAP SERPL CALC-SCNC: 10 MMOL/L (ref 7–16)
BASOPHILS # BLD: 0.1 K/UL (ref 0–0.2)
BASOPHILS NFR BLD: 1 % (ref 0–2)
BUN SERPL-MCNC: 11 MG/DL (ref 8–23)
CALCIUM SERPL-MCNC: 8.4 MG/DL (ref 8.3–10.4)
CHLORIDE SERPL-SCNC: 109 MMOL/L (ref 98–107)
CO2 SERPL-SCNC: 22 MMOL/L (ref 21–32)
CREAT SERPL-MCNC: 1.25 MG/DL (ref 0.6–1)
DIFFERENTIAL METHOD BLD: ABNORMAL
EOSINOPHIL # BLD: 0.1 K/UL (ref 0–0.8)
EOSINOPHIL NFR BLD: 1 % (ref 0.5–7.8)
ERYTHROCYTE [DISTWIDTH] IN BLOOD BY AUTOMATED COUNT: 15.2 %
GLUCOSE SERPL-MCNC: 92 MG/DL (ref 65–100)
HCT VFR BLD AUTO: 27.7 % (ref 35.8–46.3)
HGB BLD-MCNC: 8.8 G/DL (ref 11.7–15.4)
IMM GRANULOCYTES # BLD: 0.1 K/UL (ref 0–0.5)
IMM GRANULOCYTES NFR BLD AUTO: 1 % (ref 0–5)
LYMPHOCYTES # BLD: 1 K/UL (ref 0.5–4.6)
LYMPHOCYTES NFR BLD: 9 % (ref 13–44)
MCH RBC QN AUTO: 26.5 PG (ref 26.1–32.9)
MCHC RBC AUTO-ENTMCNC: 31.8 G/DL (ref 31.4–35)
MCV RBC AUTO: 83.4 FL (ref 79.6–97.8)
MM INDURATION POC: 0 MM (ref 0–5)
MONOCYTES # BLD: 0.6 K/UL (ref 0.1–1.3)
MONOCYTES NFR BLD: 5 % (ref 4–12)
NEUTS SEG # BLD: 9.1 K/UL (ref 1.7–8.2)
NEUTS SEG NFR BLD: 83 % (ref 43–78)
NRBC # BLD: 0 K/UL (ref 0–0.2)
PLATELET # BLD AUTO: 392 K/UL (ref 150–450)
PMV BLD AUTO: 11.6 FL (ref 9.4–12.3)
POTASSIUM SERPL-SCNC: 3.5 MMOL/L (ref 3.5–5.1)
PPD POC: NORMAL NEGATIVE
RBC # BLD AUTO: 3.32 M/UL (ref 4.05–5.2)
SODIUM SERPL-SCNC: 141 MMOL/L (ref 136–145)
VANCOMYCIN TROUGH SERPL-MCNC: 11.6 UG/ML (ref 5–20)
WBC # BLD AUTO: 11 K/UL (ref 4.3–11.1)

## 2018-11-11 PROCEDURE — 80202 ASSAY OF VANCOMYCIN: CPT

## 2018-11-11 PROCEDURE — 80048 BASIC METABOLIC PNL TOTAL CA: CPT

## 2018-11-11 PROCEDURE — 74011250636 HC RX REV CODE- 250/636: Performed by: NEUROLOGICAL SURGERY

## 2018-11-11 PROCEDURE — 74011250637 HC RX REV CODE- 250/637: Performed by: FAMILY MEDICINE

## 2018-11-11 PROCEDURE — 85025 COMPLETE CBC W/AUTO DIFF WBC: CPT

## 2018-11-11 PROCEDURE — 77030020263 HC SOL INJ SOD CL0.9% LFCR 1000ML

## 2018-11-11 PROCEDURE — 74011250637 HC RX REV CODE- 250/637: Performed by: INTERNAL MEDICINE

## 2018-11-11 PROCEDURE — 74011000258 HC RX REV CODE- 258: Performed by: NEUROLOGICAL SURGERY

## 2018-11-11 PROCEDURE — 77030019605

## 2018-11-11 PROCEDURE — 74011250636 HC RX REV CODE- 250/636: Performed by: FAMILY MEDICINE

## 2018-11-11 PROCEDURE — 36415 COLL VENOUS BLD VENIPUNCTURE: CPT

## 2018-11-11 PROCEDURE — 65620000000 HC RM CCU GENERAL

## 2018-11-11 RX ORDER — AMLODIPINE BESYLATE 5 MG/1
5 TABLET ORAL DAILY
Status: DISCONTINUED | OUTPATIENT
Start: 2018-11-11 | End: 2018-11-12

## 2018-11-11 RX ORDER — LABETALOL HYDROCHLORIDE 5 MG/ML
20 INJECTION, SOLUTION INTRAVENOUS
Status: DISCONTINUED | OUTPATIENT
Start: 2018-11-11 | End: 2018-11-26 | Stop reason: HOSPADM

## 2018-11-11 RX ORDER — VANCOMYCIN/0.9 % SOD CHLORIDE 1.5G/250ML
1500 PLASTIC BAG, INJECTION (ML) INTRAVENOUS
Status: DISCONTINUED | OUTPATIENT
Start: 2018-11-12 | End: 2018-11-20

## 2018-11-11 RX ORDER — HYDRALAZINE HYDROCHLORIDE 25 MG/1
25 TABLET, FILM COATED ORAL 3 TIMES DAILY
Status: DISCONTINUED | OUTPATIENT
Start: 2018-11-11 | End: 2018-11-12

## 2018-11-11 RX ADMIN — METRONIDAZOLE 500 MG: 500 INJECTION, SOLUTION INTRAVENOUS at 17:37

## 2018-11-11 RX ADMIN — VANCOMYCIN HYDROCHLORIDE 1500 MG: 10 INJECTION, POWDER, LYOPHILIZED, FOR SOLUTION INTRAVENOUS at 16:27

## 2018-11-11 RX ADMIN — ROSUVASTATIN CALCIUM 10 MG: 5 TABLET, FILM COATED ORAL at 22:10

## 2018-11-11 RX ADMIN — Medication 10 ML: at 15:20

## 2018-11-11 RX ADMIN — POTASSIUM CHLORIDE 20 MEQ: 20 TABLET, EXTENDED RELEASE ORAL at 17:37

## 2018-11-11 RX ADMIN — CALCIUM CARBONATE 500 MG (1,250 MG)-VITAMIN D3 200 UNIT TABLET 1 TABLET: at 09:49

## 2018-11-11 RX ADMIN — METRONIDAZOLE 500 MG: 500 INJECTION, SOLUTION INTRAVENOUS at 05:57

## 2018-11-11 RX ADMIN — HYDRALAZINE HYDROCHLORIDE 25 MG: 25 TABLET, FILM COATED ORAL at 09:49

## 2018-11-11 RX ADMIN — HYDRALAZINE HYDROCHLORIDE 25 MG: 25 TABLET, FILM COATED ORAL at 16:27

## 2018-11-11 RX ADMIN — DONEPEZIL HYDROCHLORIDE 10 MG: 5 TABLET, FILM COATED ORAL at 22:10

## 2018-11-11 RX ADMIN — METRONIDAZOLE 500 MG: 500 INJECTION, SOLUTION INTRAVENOUS at 11:17

## 2018-11-11 RX ADMIN — CEFTRIAXONE SODIUM 2 G: 2 INJECTION, POWDER, FOR SOLUTION INTRAMUSCULAR; INTRAVENOUS at 06:39

## 2018-11-11 RX ADMIN — HYDRALAZINE HYDROCHLORIDE 25 MG: 25 TABLET, FILM COATED ORAL at 22:10

## 2018-11-11 RX ADMIN — SODIUM CHLORIDE 100 ML/HR: 900 INJECTION, SOLUTION INTRAVENOUS at 18:53

## 2018-11-11 RX ADMIN — Medication 10 ML: at 22:10

## 2018-11-11 RX ADMIN — FOLIC ACID 0.5 MG: 1 TABLET ORAL at 09:49

## 2018-11-11 RX ADMIN — POTASSIUM CHLORIDE 20 MEQ: 20 TABLET, EXTENDED RELEASE ORAL at 09:49

## 2018-11-11 RX ADMIN — AMLODIPINE BESYLATE 5 MG: 5 TABLET ORAL at 09:49

## 2018-11-11 RX ADMIN — MEMANTINE HYDROCHLORIDE 5 MG: 5 TABLET ORAL at 09:49

## 2018-11-11 RX ADMIN — CEFTRIAXONE SODIUM 2 G: 2 INJECTION, POWDER, FOR SOLUTION INTRAMUSCULAR; INTRAVENOUS at 19:44

## 2018-11-11 RX ADMIN — METRONIDAZOLE 500 MG: 500 INJECTION, SOLUTION INTRAVENOUS at 23:54

## 2018-11-11 RX ADMIN — MEMANTINE HYDROCHLORIDE 5 MG: 5 TABLET ORAL at 17:37

## 2018-11-11 RX ADMIN — Medication 10 ML: at 05:57

## 2018-11-11 NOTE — PROGRESS NOTES
Admit Date: 2018 Subjective: No complaints, no events overnight. Asking for more candy. Objective:  
 
Patient Vitals for the past 8 hrs: 
 BP Temp Pulse Resp SpO2  
18 1002 156/72  72 17 99 % 18 0957 159/83  70 20 99 % 18 0902 174/78  82 18 99 % 18 0832 168/77  85 29 97 % 18 0803 162/77  (!) 101 25 97 % 18 0731 174/82 97.6 °F (36.4 °C) 66 17 99 % 18 0702 165/83  61 18 99 % 18 0700     98 % 18 0632 162/73  61 17 98 % 18 0602 168/79  64 18 99 % 18 0502 167/78  64 19 99 % 18 0431 168/78  60 15 98 % 18 0402 171/79 98 °F (36.7 °C) (!) 59 13 99 % 18 0331 153/76  (!) 59 13 99 % 18 0302 150/73  66 19 98 % Temp (24hrs), Av °F (36.7 °C), Min:97.6 °F (36.4 °C), Max:98.3 °F (36.8 °C) AAOx4 
OES PERRL 
CN grossly intact SF, voice clear Neck supple Respirations non-labored Regular heart rate MAEW 
5/5 strength BUE/BLE 
SILT Incision: c/d/i 
EVD open at 10 cm above EAM 
 ICP 10-12 at bedside Output: 288 cc / 24 hours Data Review Recent Results (from the past 24 hour(s)) PLEASE READ & DOCUMENT PPD TEST IN 24 HRS Collection Time: 11/10/18  4:08 PM  
Result Value Ref Range PPD negative Negative  
 mm Induration 0 mm METABOLIC PANEL, BASIC Collection Time: 18  3:44 AM  
Result Value Ref Range Sodium 141 136 - 145 mmol/L Potassium 3.5 3.5 - 5.1 mmol/L Chloride 109 (H) 98 - 107 mmol/L  
 CO2 22 21 - 32 mmol/L Anion gap 10 7 - 16 mmol/L Glucose 92 65 - 100 mg/dL BUN 11 8 - 23 MG/DL Creatinine 1.25 (H) 0.6 - 1.0 MG/DL  
 GFR est AA 56 (L) >60 ml/min/1.73m2 GFR est non-AA 46 (L) >60 ml/min/1.73m2 Calcium 8.4 8.3 - 10.4 MG/DL  
CBC WITH AUTOMATED DIFF Collection Time: 18  3:44 AM  
Result Value Ref Range WBC 11.0 4.3 - 11.1 K/uL  
 RBC 3.32 (L) 4.05 - 5.2 M/uL HGB 8.8 (L) 11.7 - 15.4 g/dL HCT 27.7 (L) 35.8 - 46.3 % MCV 83.4 79.6 - 97.8 FL  
 MCH 26.5 26.1 - 32.9 PG  
 MCHC 31.8 31.4 - 35.0 g/dL  
 RDW 15.2 % PLATELET 891 622 - 344 K/uL MPV 11.6 9.4 - 12.3 FL ABSOLUTE NRBC 0.00 0.0 - 0.2 K/uL  
 DF AUTOMATED NEUTROPHILS 83 (H) 43 - 78 % LYMPHOCYTES 9 (L) 13 - 44 % MONOCYTES 5 4.0 - 12.0 % EOSINOPHILS 1 0.5 - 7.8 % BASOPHILS 1 0.0 - 2.0 % IMMATURE GRANULOCYTES 1 0.0 - 5.0 %  
 ABS. NEUTROPHILS 9.1 (H) 1.7 - 8.2 K/UL  
 ABS. LYMPHOCYTES 1.0 0.5 - 4.6 K/UL  
 ABS. MONOCYTES 0.6 0.1 - 1.3 K/UL  
 ABS. EOSINOPHILS 0.1 0.0 - 0.8 K/UL  
 ABS. BASOPHILS 0.1 0.0 - 0.2 K/UL  
 ABS. IMM. GRANS. 0.1 0.0 - 0.5 K/UL  
  
 
images and reports reviewed Assessment:    
59 y.o. female Principal Problem: 
  Acute encephalopathy (12/12/2013) Active Problems: 
  [facility] (6/9/2013) Hypertension (6/9/2013) MORIS (acute kidney injury) ([facility] (11/8/2018) Plan:  
- Continue EVD open at 10 
- Cultures NGTD, Gram Stain negative - Repeat CSF Monday Ting Cramer MD, PhD 
Neurosurgery

## 2018-11-11 NOTE — PROGRESS NOTES
Hospitalist Progress Note 2018 Admit Date: 2018  6:44 AM  
NAME: Fior Barrientos :  1955 MRN:  [id number] Attending: Pam Thakkar MD 
PCP:  Other, MD Montrell 
 
SUBJECTIVE:  
 
59 y.o. female with a PMH of multiple medical comorbidities including multiple ventriculoperitoneal shunt revisions a history of a brain tumor resection who presents to [address] [place] [address] with 3 days worth of altered mental status and gait disturbance her family. CT head without contrast that demonstrates increased ventricular size, as well as bifrontal pneumocephalus concerning for a gas-forming organism as a cause of the  shunt infection. Shunt tapped by NS and pt ultimately taken to OR for shunt removal on . On broad spectrum abx. Continues to do well. Feeding herself breakfast this AM. No complaints. Review of Systems negative with exception of pertinent positives noted above PHYSICAL EXAM  
 
Visit Vitals /82 Pulse 66 Temp 97.6 °F (36.4 °C) Resp 17 Ht 5' 6\" (1.676 m) Wt 104.9 kg (231 lb 4.2 oz) SpO2 99% Breastfeeding? No  
BMI 37.33 kg/m² Temp (24hrs), Av °F (36.7 °C), Min:97.6 °F (36.4 °C), Max:98.3 °F (36.8 °C) Oxygen Therapy O2 Sat (%): 99 % (18 0731) Pulse via Oximetry: 64 beats per minute (18 0731) O2 Device: Room air (18 0700) O2 Flow Rate (L/min): 2 l/min (11/10/18 1901) Intake/Output Summary (Last 24 hours) at 2018 6026 Last data filed at 2018 7798 Gross per 24 hour Intake 3952 ml Output 1955 ml Net 1997 ml General: No acute distress   
Neck:  No LAD Lungs:  CTA Bilaterally Heart:  Regular rate and rhythm,  No murmur, rub, or gallop Abdomen: Soft, Non distended, Non tender, Positive bowel sounds Extremities: No cyanosis, clubbing or edema Neurologic:  No focal deficits Psych:  Calm, cooperative ASSESSMENT Active Hospital Problems Diagnosis Date Noted  MORIS (acute kidney injury) ([facility] 11/08/2018  Acute encephalopathy 12/12/2013  Malfunction of ventriculoperitoneal shunt ([facility] 06/09/2013 Class: Acute  Hypertension 06/09/2013 Class: Chronic Ventriculoperitoneal shunt malfunction/infection S/p removal of left ventriculoperitoneal shunt and right ventricular catheter and placement of a right external ventricular drain on 11/8 
- on abx per NS 
- further mgmt per NS MORIS Cr 1.8 on admit, now improving. 
- cont IVFs 
- follow BMP 
- UA negative 
- renal US with suggestion of renal artery stenosis 
- holding lisinopril HTN 
SBP now in 170s 
- holding lisinopril, HCTZ given MORIS 
- add Norvasc, Hydralazine 
- PRN hydralazine for BP control - Likely EDWARDO seen on renal US - if unable to gain BP control, may need vasc involvement. Otherwise, can f/u as outpt for eval. 
 
Proph - SCDs Full code Dispo - per NS Signed By: Dereck Gomez MD   
 November 11, 2018

## 2018-11-11 NOTE — PROGRESS NOTES
Bedside and Verbal shift change report given to Edson Elizondo RN (oncoming nurse) by Yolie Uriarte RN (offgoing nurse). Report included the following information SBAR, Kardex, ED Summary, OR Summary, Procedure Summary, Intake/Output, MAR, Recent Results and Cardiac Rhythm SB/NSR, HR 60. Dual skin assessment complete. Dual neuro checks complete. L side EVD remains in place, site cdi, re-leveled at 10cm H2O above the tragus, serous/clear output. ICP 11. Pt denies complaints at this time. Call light in reach.

## 2018-11-11 NOTE — PROGRESS NOTES
Pharmacokinetic Consult to Pharmacist 
 
Iraida Ivana is a 59 y.o. 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 Height: 5' 6\" (167.6 cm)  Weight: 104.9 kg (231 lb 4.2 oz) Lab Results Component Value Date/Time BUN 11 11/11/2018 03:44 AM  
 Creatinine 1.25 (H) 11/11/2018 03:44 AM  
 WBC 11.0 11/11/2018 03:44 AM  
 Procalcitonin <0.1 11/08/2018 08:19 AM  
 Lactic acid 1.8 12/12/2013 05:10 AM  
 Lactic Acid (POC) 1.13 11/08/2018 08:16 AM  
  
Estimated Creatinine Clearance: 57.1 mL/min (A) (based on SCr of 1.25 mg/dL (H)). Lab Results Component Value Date/Time Vancomycin,trough 11.6 11/11/2018 03:29 PM  
 
 
Day 4 of vancomycin. Goal trough is 15-20. Change to 1.5g q18h, next dose tomorrow at 1000 Will continue to follow patient. Thank you, Ebonie Hallman, Pharm. D. Clinical Pharmacist 
[phone]

## 2018-11-11 NOTE — PROGRESS NOTES
Bedside and Verbal shift change report given to Brandan Moffett RN (oncoming nurse) by Lucero Cheema RN (offgoing nurse). Report included the following information SBAR, Kardex, ED Summary, Procedure Summary, Intake/Output, MAR, Recent Results and Cardiac Rhythm NSR. Dual neuro check and skin assessment complete. Pt lying calmly in bed, in NAD. Alert and oriented to person, year, situation and knows she's in the hospital but not which one or location. [address] [facility].... [employer] .. [employer] Singh [employer] [place]. \"  Has hx of short term memory loss after being dx w/brain tumor and had resection several years ago (unsure what year). Follows commands appropriately. Pupils 2 mm, round and brisk. Focuses/tracks with eyes. No visual disturbances. Pt with generalized weakness but strength equal in BUE/BLE.  NSR, HR 61 on monitor. BP 160s/80s. Anterior/Left head incision sites x2 all with dressings cdi. L side EVD in place, level at 10 cm H2O above the tragus, serous/clear output noted. ICP 10. Pt denies any complaints at this time. Positioned for comfort. Call light in reach. Continuing to monitor closely.

## 2018-11-11 NOTE — PROGRESS NOTES
Bedside shift change report given to Sarah Ramos RN (oncoming nurse) by Jesse Christopher RN (offgoing nurse). Report included the following information SBAR, Kardex, ED Summary, OR Summary, Procedure Summary, Intake/Output, MAR, Recent Results and Cardiac Rhythm NSR. Dual neuro assessment performed Ventricular drain leveled and emptied Patient repositioned

## 2018-11-11 NOTE — PROGRESS NOTES
Drainage bag full on EVD system. EVD system turned off to pt. Drainage bag successfully changed out under sterile technique. EVD system turned back on. Re-leveled and zeroed. ICP 10. Pt neurologically unchanged. Continues to have short term memory issues and intermittently confused to time/year and place. No complaints voiced. VSS. Continuing to monitor closely.

## 2018-11-12 LAB
ANION GAP SERPL CALC-SCNC: 8 MMOL/L (ref 7–16)
BUN SERPL-MCNC: 8 MG/DL (ref 8–23)
CALCIUM SERPL-MCNC: 8.2 MG/DL (ref 8.3–10.4)
CHLORIDE SERPL-SCNC: 110 MMOL/L (ref 98–107)
CO2 SERPL-SCNC: 24 MMOL/L (ref 21–32)
CREAT SERPL-MCNC: 1.18 MG/DL (ref 0.6–1)
GLUCOSE SERPL-MCNC: 102 MG/DL (ref 65–100)
POTASSIUM SERPL-SCNC: 3.6 MMOL/L (ref 3.5–5.1)
SODIUM SERPL-SCNC: 142 MMOL/L (ref 136–145)

## 2018-11-12 PROCEDURE — 74011250637 HC RX REV CODE- 250/637: Performed by: INTERNAL MEDICINE

## 2018-11-12 PROCEDURE — 74011250636 HC RX REV CODE- 250/636: Performed by: FAMILY MEDICINE

## 2018-11-12 PROCEDURE — 74011250637 HC RX REV CODE- 250/637: Performed by: FAMILY MEDICINE

## 2018-11-12 PROCEDURE — 65620000000 HC RM CCU GENERAL

## 2018-11-12 PROCEDURE — 80048 BASIC METABOLIC PNL TOTAL CA: CPT

## 2018-11-12 PROCEDURE — 74011000258 HC RX REV CODE- 258: Performed by: NEUROLOGICAL SURGERY

## 2018-11-12 PROCEDURE — 77030020263 HC SOL INJ SOD CL0.9% LFCR 1000ML

## 2018-11-12 PROCEDURE — 74011250636 HC RX REV CODE- 250/636: Performed by: NEUROLOGICAL SURGERY

## 2018-11-12 PROCEDURE — 36415 COLL VENOUS BLD VENIPUNCTURE: CPT

## 2018-11-12 RX ORDER — POTASSIUM CHLORIDE 20 MEQ/1
40 TABLET, EXTENDED RELEASE ORAL
Status: COMPLETED | OUTPATIENT
Start: 2018-11-12 | End: 2018-11-12

## 2018-11-12 RX ORDER — AMLODIPINE BESYLATE 10 MG/1
10 TABLET ORAL DAILY
Status: DISCONTINUED | OUTPATIENT
Start: 2018-11-12 | End: 2018-11-26 | Stop reason: HOSPADM

## 2018-11-12 RX ORDER — HYDRALAZINE HYDROCHLORIDE 50 MG/1
50 TABLET, FILM COATED ORAL 3 TIMES DAILY
Status: DISCONTINUED | OUTPATIENT
Start: 2018-11-12 | End: 2018-11-26 | Stop reason: HOSPADM

## 2018-11-12 RX ADMIN — MEMANTINE HYDROCHLORIDE 5 MG: 5 TABLET ORAL at 09:04

## 2018-11-12 RX ADMIN — CALCIUM CARBONATE 500 MG (1,250 MG)-VITAMIN D3 200 UNIT TABLET 1 TABLET: at 09:04

## 2018-11-12 RX ADMIN — POTASSIUM CHLORIDE 40 MEQ: 20 TABLET, EXTENDED RELEASE ORAL at 11:25

## 2018-11-12 RX ADMIN — VANCOMYCIN HYDROCHLORIDE 1500 MG: 10 INJECTION, POWDER, LYOPHILIZED, FOR SOLUTION INTRAVENOUS at 09:37

## 2018-11-12 RX ADMIN — Medication 10 ML: at 16:22

## 2018-11-12 RX ADMIN — METRONIDAZOLE 500 MG: 500 INJECTION, SOLUTION INTRAVENOUS at 05:39

## 2018-11-12 RX ADMIN — MEMANTINE HYDROCHLORIDE 5 MG: 5 TABLET ORAL at 17:27

## 2018-11-12 RX ADMIN — METRONIDAZOLE 500 MG: 500 INJECTION, SOLUTION INTRAVENOUS at 17:01

## 2018-11-12 RX ADMIN — Medication 10 ML: at 05:39

## 2018-11-12 RX ADMIN — HYDRALAZINE HYDROCHLORIDE 50 MG: 50 TABLET, FILM COATED ORAL at 22:02

## 2018-11-12 RX ADMIN — ROSUVASTATIN CALCIUM 10 MG: 5 TABLET, FILM COATED ORAL at 22:02

## 2018-11-12 RX ADMIN — POTASSIUM CHLORIDE 20 MEQ: 20 TABLET, EXTENDED RELEASE ORAL at 09:04

## 2018-11-12 RX ADMIN — HYDRALAZINE HYDROCHLORIDE 50 MG: 50 TABLET, FILM COATED ORAL at 16:21

## 2018-11-12 RX ADMIN — HYDRALAZINE HYDROCHLORIDE 25 MG: 25 TABLET, FILM COATED ORAL at 09:04

## 2018-11-12 RX ADMIN — CEFTRIAXONE SODIUM 2 G: 2 INJECTION, POWDER, FOR SOLUTION INTRAMUSCULAR; INTRAVENOUS at 07:20

## 2018-11-12 RX ADMIN — METRONIDAZOLE 500 MG: 500 INJECTION, SOLUTION INTRAVENOUS at 11:25

## 2018-11-12 RX ADMIN — CEFTRIAXONE SODIUM 2 G: 2 INJECTION, POWDER, FOR SOLUTION INTRAMUSCULAR; INTRAVENOUS at 19:54

## 2018-11-12 RX ADMIN — DONEPEZIL HYDROCHLORIDE 10 MG: 5 TABLET, FILM COATED ORAL at 22:02

## 2018-11-12 RX ADMIN — Medication 10 ML: at 22:02

## 2018-11-12 RX ADMIN — AMLODIPINE BESYLATE 10 MG: 10 TABLET ORAL at 09:04

## 2018-11-12 RX ADMIN — POTASSIUM CHLORIDE 20 MEQ: 20 TABLET, EXTENDED RELEASE ORAL at 17:02

## 2018-11-12 RX ADMIN — FOLIC ACID 0.5 MG: 1 TABLET ORAL at 09:04

## 2018-11-12 NOTE — INTERDISCIPLINARY ROUNDS
Interdisciplinary team rounds were held 11/12/2018 with the following team members:Care Management, Nursing, Nurse Practitioner, Nutrition, Palliative Care, Pastoral Care, Pharmacy, Physical Therapy, Physician and Respiratory Therapy and the patient. Plan of care discussed. See clinical pathway and/or care plan for interventions and desired outcomes.

## 2018-11-12 NOTE — PROGRESS NOTES
Hospitalist Progress Note 2018 Admit Date: 2018  6:44 AM  
NAME: Aly Fish :  1955 MRN:  [id number] Attending: Beth Phan MD 
PCP:  Karli, MD Montrell 
 
SUBJECTIVE:  
 
59 y.o. female with a PMH of multiple medical comorbidities including multiple ventriculoperitoneal shunt revisions a history of a brain tumor resection who presents to [address] [place] [address] with 3 days worth of altered mental status and gait disturbance her family. CT head without contrast that demonstrates increased ventricular size, as well as bifrontal pneumocephalus concerning for a gas-forming organism as a cause of the  shunt infection. Shunt tapped by NS and pt ultimately taken to OR for shunt removal on . On broad spectrum abx. Pt without complaints this AM. Denies HA, CP, SOB. Review of Systems negative with exception of pertinent positives noted above PHYSICAL EXAM  
 
Visit Vitals /82 Pulse (!) 59 Temp 97.5 °F (36.4 °C) Resp 12 Ht 5' 6\" (1.676 m) Wt 104.9 kg (231 lb 4.2 oz) SpO2 100% Breastfeeding? No  
BMI 37.33 kg/m² Temp (24hrs), Av.7 °F (36.5 °C), Min:97.5 °F (36.4 °C), Max:98 °F (36.7 °C) Oxygen Therapy O2 Sat (%): 100 % (18 1002) Pulse via Oximetry: 59 beats per minute (18 1002) O2 Device: Room air (18 0732) O2 Flow Rate (L/min): 2 l/min (11/10/18 1901) Intake/Output Summary (Last 24 hours) at 2018 1054 Last data filed at 2018 1003 Gross per 24 hour Intake 3062 ml Output 3771 ml Net -709 ml General: No acute distress   
Neck:  No LAD Lungs:  CTA Bilaterally Heart:  Regular rate and rhythm,  No murmur, rub, or gallop Abdomen: Soft, Non distended, Non tender, Positive bowel sounds Extremities: No cyanosis, clubbing or edema Neurologic:  No focal deficits Psych:  Calm, cooperative ASSESSMENT Active Hospital Problems Diagnosis Date Noted  MORIS (acute kidney injury) ([facility] 11/08/2018  Acute encephalopathy 12/12/2013  Malfunction of ventriculoperitoneal shunt ([facility] 06/09/2013 Class: Acute  Hypertension 06/09/2013 Class: Chronic Ventriculoperitoneal shunt malfunction/infection S/p removal of left ventriculoperitoneal shunt and right ventricular catheter and placement of a right external ventricular drain on 11/8 
- on abx per NS 
- further mgmt per NS MORIS Cr 1.8 on admit, normalizing. Good PO intake. - d/c IVFs 
- follow BMP periodically - UA negative 
- renal US with suggestion of renal artery stenosis 
- holding lisinopril HTN 
- holding lisinopril, HCTZ given MORIS 
- increase Norvasc, Hydralazine 
- PRN hydralazine for BP control - Likely EDWARDO seen on renal US - if unable to gain BP control, may need vasc involvement. Otherwise, can f/u as outpt for eval. 
 
Hypokalemia 
- replace. Proph - SCDs Full code Dispo - per NS Signed By: Linda Sandoval MD   
 November 12, 2018

## 2018-11-12 NOTE — PROGRESS NOTES
Pt is awake and alert. Follows commands. Pt c/o being cold. Warm blanket to pt. No change in neuro assessment.

## 2018-11-12 NOTE — PROGRESS NOTES
Report given to [facility], RN. Dual neuro check complete. EVD drainage and ICP measured. Pt oriented to person, place, and situation.

## 2018-11-12 NOTE — PROGRESS NOTES
NEUROSURGERY PROGRESS NOTE:  
Admit Date: 11/8/2018 Subjective: No acute overnight events. Objective: 
Visit Vitals /77 (BP 1 Location: Right arm, BP Patient Position: At rest) Pulse 69 Temp 97.5 °F (36.4 °C) Resp 15 Ht 5' 6\" (1.676 m) Wt 231 lb 4.2 oz (104.9 kg) SpO2 100% Breastfeeding? No  
BMI 37.33 kg/m² General: No acute distress Awake, alert, and oriented to person, thought she was at [facility], not president, oriented to year Eyes open spontaneously PERRL, EOMI with slight exotropia of the left eye at baseline visual fields grossly intact to light and confrontation Slight nystagmus present Hearing grossly intact Face symmetric and tongue mid-line on protrusion No pronation or drift on exam   
Patient with 5/5 strength in the bilateral upper and bilateral lower extremities Sensation intact to light touch and pin-prick Incisions: Clean, dry, and intact with dressing in place, left EVD exit site with biopatch in place with 234 cc output since placement. Assessment and Plan:  
Penney Dakin 59 y.o. female who is now 4 Days Post-Op from removal of her left ventriculoperitoneal shunt and right ventricular catheter and placement of a right external ventricular drain for treatment of ventriculoperitoneal shunt malfunction/infection. 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 Will continue to treat with IV anti-biotics for at least 7 to 10 days and monitor culture data. - Continue ICU level care - Can do q2H neuro checks - Continue SCDs bilaterally for DVT Prophylaxis - Continue Diet  
- Contiue EVD at 10 cmH2O call if drain and measure ICP q1H 
- Will obtain CSF culture tomorrow - Please call with questions or concerns Shivam Melendrez. René Jurado [facility]

## 2018-11-12 NOTE — PROGRESS NOTES
Bedside report [facility] from  [facility]. Pt drowsy, but arouses to name. Pt follows commands. Confused to situation. EVD drain in place to left head. Dressing dry and intact. Drainage is clear serous color. Pt denies any pain.

## 2018-11-12 NOTE — PROGRESS NOTES
Patient was alert Very receptive to  presence Encouraged with prayer and words of hope Will continue to follow thru her admission Christel Aceves, staff , Shivani [address], [address] [facility]  /   [email or web address]

## 2018-11-13 LAB
APPEARANCE FLD: NORMAL
BACTERIA SPEC CULT: NORMAL
COLOR FLD: NORMAL
GLUCOSE CSF-MCNC: 54 MG/DL (ref 40–70)
GRAM STN SPEC: NORMAL
GRAM STN SPEC: NORMAL
LACTATE CSF-SCNC: 2.8 MMOL/L (ref 1.1–2.4)
MM INDURATION POC: 0 MM (ref 0–5)
NUC CELL # FLD: 2 /CU MM
PPD POC: NORMAL NEGATIVE
PROT CSF-MCNC: 31 MG/DL (ref 15–45)
RBC # FLD: 3375 /CU MM
SERVICE CMNT-IMP: NORMAL
SPECIMEN SOURCE FLD: NORMAL
TUBE # CSF: 1
TUBE # CSF: 1

## 2018-11-13 PROCEDURE — 89050 BODY FLUID CELL COUNT: CPT

## 2018-11-13 PROCEDURE — 74011250636 HC RX REV CODE- 250/636: Performed by: NEUROLOGICAL SURGERY

## 2018-11-13 PROCEDURE — 83605 ASSAY OF LACTIC ACID: CPT

## 2018-11-13 PROCEDURE — 84157 ASSAY OF PROTEIN OTHER: CPT

## 2018-11-13 PROCEDURE — 74011250637 HC RX REV CODE- 250/637: Performed by: FAMILY MEDICINE

## 2018-11-13 PROCEDURE — 74011250636 HC RX REV CODE- 250/636: Performed by: FAMILY MEDICINE

## 2018-11-13 PROCEDURE — 77030020263 HC SOL INJ SOD CL0.9% LFCR 1000ML

## 2018-11-13 PROCEDURE — 82945 GLUCOSE OTHER FLUID: CPT

## 2018-11-13 PROCEDURE — 65620000000 HC RM CCU GENERAL

## 2018-11-13 PROCEDURE — 74011250637 HC RX REV CODE- 250/637: Performed by: INTERNAL MEDICINE

## 2018-11-13 PROCEDURE — 87205 SMEAR GRAM STAIN: CPT

## 2018-11-13 PROCEDURE — 74011000258 HC RX REV CODE- 258: Performed by: NEUROLOGICAL SURGERY

## 2018-11-13 RX ADMIN — FOLIC ACID 0.5 MG: 1 TABLET ORAL at 09:56

## 2018-11-13 RX ADMIN — MEMANTINE HYDROCHLORIDE 5 MG: 5 TABLET ORAL at 17:37

## 2018-11-13 RX ADMIN — METRONIDAZOLE 500 MG: 500 INJECTION, SOLUTION INTRAVENOUS at 11:51

## 2018-11-13 RX ADMIN — METRONIDAZOLE 500 MG: 500 INJECTION, SOLUTION INTRAVENOUS at 17:38

## 2018-11-13 RX ADMIN — CEFTRIAXONE SODIUM 2 G: 2 INJECTION, POWDER, FOR SOLUTION INTRAMUSCULAR; INTRAVENOUS at 07:33

## 2018-11-13 RX ADMIN — VANCOMYCIN HYDROCHLORIDE 1500 MG: 10 INJECTION, POWDER, LYOPHILIZED, FOR SOLUTION INTRAVENOUS at 21:52

## 2018-11-13 RX ADMIN — CEFTRIAXONE SODIUM 2 G: 2 INJECTION, POWDER, FOR SOLUTION INTRAMUSCULAR; INTRAVENOUS at 19:48

## 2018-11-13 RX ADMIN — Medication 10 ML: at 14:00

## 2018-11-13 RX ADMIN — MEMANTINE HYDROCHLORIDE 5 MG: 5 TABLET ORAL at 09:56

## 2018-11-13 RX ADMIN — HYDRALAZINE HYDROCHLORIDE 50 MG: 50 TABLET, FILM COATED ORAL at 09:56

## 2018-11-13 RX ADMIN — CALCIUM CARBONATE 500 MG (1,250 MG)-VITAMIN D3 200 UNIT TABLET 1 TABLET: at 07:33

## 2018-11-13 RX ADMIN — METRONIDAZOLE 500 MG: 500 INJECTION, SOLUTION INTRAVENOUS at 05:54

## 2018-11-13 RX ADMIN — AMLODIPINE BESYLATE 10 MG: 10 TABLET ORAL at 09:56

## 2018-11-13 RX ADMIN — VANCOMYCIN HYDROCHLORIDE 1500 MG: 10 INJECTION, POWDER, LYOPHILIZED, FOR SOLUTION INTRAVENOUS at 04:03

## 2018-11-13 RX ADMIN — POTASSIUM CHLORIDE 20 MEQ: 20 TABLET, EXTENDED RELEASE ORAL at 17:37

## 2018-11-13 RX ADMIN — ROSUVASTATIN CALCIUM 10 MG: 5 TABLET, FILM COATED ORAL at 21:53

## 2018-11-13 RX ADMIN — Medication 10 ML: at 21:54

## 2018-11-13 RX ADMIN — METRONIDAZOLE 500 MG: 500 INJECTION, SOLUTION INTRAVENOUS at 00:21

## 2018-11-13 RX ADMIN — HYDRALAZINE HYDROCHLORIDE 50 MG: 50 TABLET, FILM COATED ORAL at 21:53

## 2018-11-13 RX ADMIN — HYDRALAZINE HYDROCHLORIDE 50 MG: 50 TABLET, FILM COATED ORAL at 16:35

## 2018-11-13 RX ADMIN — POTASSIUM CHLORIDE 20 MEQ: 20 TABLET, EXTENDED RELEASE ORAL at 09:56

## 2018-11-13 RX ADMIN — DONEPEZIL HYDROCHLORIDE 10 MG: 5 TABLET, FILM COATED ORAL at 21:53

## 2018-11-13 RX ADMIN — Medication 10 ML: at 05:54

## 2018-11-13 NOTE — PROGRESS NOTES
Pt easily arouses to name. Alert and oriented x 2. Pt fed breakfast. No difficulty with swallowing. Pt follows commands.

## 2018-11-13 NOTE — PROGRESS NOTES
NEUROSURGERY PROGRESS NOTE:  
Admit Date: 11/8/2018 Subjective: No acute overnight events. Objective: 
Visit Vitals /82 Pulse 77 Temp 96.4 °F (35.8 °C) Resp 18 Ht 5' 6\" (1.676 m) Wt 231 lb 4.2 oz (104.9 kg) SpO2 99% Breastfeeding? No  
BMI 37.33 kg/m² General: No acute distress Awake, alert, and oriented to person, Hospital, not president, now oriented to year Eyes open spontaneously PERRL, EOMI with slight exotropia of the left eye at baseline Slight nystagmus present Hearing grossly intact Face symmetric and tongue mid-line on protrusion No pronation or drift on exam   
Patient with 5/5 strength in the bilateral upper and bilateral lower extremities Incisions: Clean, dry, and intact with dressing in place, left EVD exit site with biopatch in place with 192 cc output since placement. Assessment and Plan:  
Jessica Bentley 59 y.o. female who is now 5 Days Post-Op from removal of her left ventriculoperitoneal shunt and right ventricular catheter and placement of a right external ventricular drain for treatment of ventriculoperitoneal shunt malfunction/infection. 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 Will continue to treat with IV anti-biotics for at least 7 to 10 days and monitor culture data. - Continue ICU level care - Can do q2H neuro checks - Continue SCDs bilaterally for DVT Prophylaxis - Continue Diet  
- Contiue EVD at 10 cmH2O call if drain and measure ICP q1H 
- Will obtain CSF culture today - Please call with questions or concerns Marcos Fong. Verna Jaime, [facility]

## 2018-11-13 NOTE — INTERDISCIPLINARY ROUNDS
Interdisciplinary team rounds were held 11/13/2018 with the following team members:Care Management, Infection Control, Nursing, Nurse Practitioner, Nutrition, Occupational Therapy, Palliative Care, Pastoral Care, Pharmacy, Physical Therapy, Physician, Respiratory Therapy and Clinical Coordinator and the patient. Plan of care discussed. See clinical pathway and/or care plan for interventions and desired outcomes.

## 2018-11-13 NOTE — PROGRESS NOTES
Report given to [facility], RN. EVD in place. Drainage output and ICP measured and charted. Dual neuro assessment complete.

## 2018-11-13 NOTE — PROGRESS NOTES
Hospitalist Progress Note Admit Date:  2018  6:44 AM  
Name:  Mikaela Ambrosio Age:  59 y.o. 
:  1955 MRN:  [id number] PCP:  Karli, MD [place] 
Treatment Team: Attending Provider: Karoline Sofia MD; Consulting Provider: Kb Macedo MD; Care Manager: Bossman Banda RN; Utilization Review: Tammy Mora RN Subjective:  
 
From prior hpi: 
59 y. o. female with a PMH of multiple medical comorbidities including multiple ventriculoperitoneal shunt revisions a history of a brain tumor resection who presents to [address] [place] [address] with 3 days worth of altered mental status and gait disturbance her family. CT head without contrast that demonstrates increased ventricular size, as well as bifrontal pneumocephalus concerning for a gas-forming organism as a cause of the  shunt infection. Shunt tapped by NS and pt ultimately taken to OR for shunt removal on . On broad spectrum abx. Today: 
Nausea, no new complaints. No new visual complaints or change/cephalgia Objective:  
 
Patient Vitals for the past 24 hrs: 
 Temp Pulse Resp BP SpO2  
18 1701  90 20 152/70 98 % 18 1635  80  139/66   
18 1632  77 22 139/66 99 % 18 1601  67 13 135/63 98 % 18 1532 96.2 °F (35.7 °C) 71 16 128/60 99 % 18 1501  71 16 128/60 98 % 18 1432  73 14 132/71 98 % 18 1401  66 22 137/66 98 % 18 1332  61 12 140/67 98 % 18 1301  66 11 134/69 99 % 18 1232 96.4 °F (35.8 °C) (!) 59 10 145/66 100 % 18 1201  74 9 132/64 98 % 18 1132  70 15 149/76 100 % 18 1102  (!) 54 13 141/80 99 % 18 1033  (!) 52 15 161/73 100 % 18 1001  80 25 160/78 100 % 18 0932  64 16 144/68 100 % 18 0901  73 16 150/70 100 % 18 0832  83 18 165/79 100 % 18 0801  65 16 151/71 99 % 18 0732 96.4 °F (35.8 °C) 77 18 158/82 99 % 11/13/18 0728 96.4 °F (35.8 °C)      
11/13/18 0727  78 19  99 % 11/13/18 0701  (!) 57 13 144/70 100 % 11/13/18 0633  60 16 151/70 99 % 11/13/18 0601  65 14 149/70 99 % 11/13/18 0532  63 13 132/69 98 % 11/13/18 0501  64 11 139/74 99 % 11/13/18 0432  70 13 141/72 100 % 11/13/18 0401 98.1 °F (36.7 °C) 67 14 144/72 99 % 11/13/18 0333  64 13 145/79 99 % 11/13/18 0301  72 14 142/71 99 % 11/13/18 0231  69 10 141/68 99 % 11/13/18 0201  63 13 135/61 99 % 11/13/18 0132  62 13 121/59 98 % 11/13/18 0101  62 13 145/69 99 % 11/13/18 0031  63 16 139/65 99 % 11/13/18 0002 98 °F (36.7 °C) 71 19 144/78 99 % 11/12/18 2231  60 11 153/73 99 % 11/12/18 2201  60 13 146/70 100 % 11/12/18 2132  87 24 142/66 99 % 11/12/18 2101  67 12 150/74 99 % 11/12/18 2031  61 13 149/67 100 % 11/12/18 2001 97.7 °F (36.5 °C) 68 16 147/70 99 % 11/12/18 1854  82 20  98 % 11/12/18 1832  66 12 145/71 99 % Oxygen Therapy O2 Sat (%): 98 % (11/13/18 1701) Pulse via Oximetry: 90 beats per minute (11/13/18 1701) O2 Device: Room air (11/13/18 1532) O2 Flow Rate (L/min): 2 l/min (11/10/18 1901) Intake/Output Summary (Last 24 hours) at 11/13/2018 1815 Last data filed at 11/13/2018 0357 Gross per 24 hour Intake 1160 ml Output 1817 ml Net -657 ml Physical Examination: 
General:    Alert person place Head:  Normocephalic, atraumatic Eyes:  Extraocular movements intact, normal sclera CV:   RRR. No  Murmurs, clicks, or gallops Lungs:   Unlabored, no cyanosis Abdomen:   Soft, nondistended, nontender. Extremities: Warm and dry. No cyanosis or edema. Skin:     No rashes or jaundice. Neuro:  No gross focal deficits Psych:  cooperative Data Review: 
I have reviewed all labs, meds, telemetry events, and studies from the last 24 hours. Recent Results (from the past 24 hour(s)) CELL COUNT, BODY FLUID  Collection Time: 11/13/18  3:16 PM  
 Result Value Ref Range BODY FLUID TYPE CEREBROSPINAL FLUID FLUID COLOR PINK FLUID APPEARANCE HAZY FLUID RBC CT. 3,375 /cu mm FLUID WBC COUNT 2 /cu mm CULTURE, CSF W GRAM STAIN Collection Time: 11/13/18  3:16 PM  
Result Value Ref Range Special Requests: NO SPECIAL REQUESTS    
 GRAM STAIN NO WBCS SEEN    
 GRAM STAIN NO DEFINITE ORGANISM SEEN Culture result: CULTURE IN PROGRESS,FURTHER UPDATES TO FOLLOW    
GLUCOSE, CSF Collection Time: 11/13/18  3:16 PM  
Result Value Ref Range Tube No. 1    
 Glucose,CSF 54 40 - 70 MG/DL PROTEIN, CSF Collection Time: 11/13/18  3:16 PM  
Result Value Ref Range Tube No. 1    
 Protein,CSF 31 15 - 45 MG/DL  
LACTIC ACID, CSF Collection Time: 11/13/18  3:16 PM  
Result Value Ref Range Lactic Acid,CSF 2.8 (H) 1.1 - 2.4 MMOL/L All Micro Results Procedure Component Value Units Date/Time CULTURE, CSF G[facility]mez [place]bon [[id number]] Collected:  11/13/18 1516 Order Status:  Completed Specimen:  Cerebrospinal Fluid Updated:  11/13/18 1620 Special Requests: NO SPECIAL REQUESTS     
  GRAM STAIN NO WBCS SEEN     
   NO DEFINITE ORGANISM SEEN Culture result:    
  CULTURE IN PROGRESS,FURTHER UPDATES TO FOLLOW CULTURE, CSF G[facility] [place]ju [[id number]] Collected:  11/08/18 0912 Order Status:  Completed Specimen:  Cerebrospinal Fluid Updated:  11/13/18 0725 Special Requests: NO SPECIAL REQUESTS     
  GRAM STAIN 0 TO 3 WBCS/OIF  
   NO DEFINITE ORGANISM SEEN Culture result: NO GROWTH 5 DAYS     
 CULTURE, BLOOD [[id number]] Collected:  11/08/18 0836 Order Status:  Completed Specimen:  Blood Updated:  11/13/18 0719 Special Requests: --     
  LEFT 
FOREARM Culture result: NO GROWTH 5 DAYS     
 CULTURE, BLOOD [[id number]] Collected:  11/08/18 4448 Order Status:  Completed Specimen:  Blood Updated:  11/13/18 0719   Special Requests: --     
  RIGHT 
FOREARM 
  
 Culture result: NO GROWTH 5 DAYS Current Meds: 
Current Facility-Administered Medications Medication Dose Route Frequency  [START ON 11/14/2018] Vancomycin trough reminder   Other ONCE  
 amLODIPine (NORVASC) tablet 10 mg  10 mg Oral DAILY  hydrALAZINE (APRESOLINE) tablet 50 mg  50 mg Oral TID  labetalol (NORMODYNE;TRANDATE) injection 20 mg  20 mg IntraVENous Q6H PRN  
 vancomycin (VANCOCIN) 1500 mg in  ml infusion  1,500 mg IntraVENous Q18H  
 influenza vaccine 2018-19 (6 mos+)(PF) (FLUARIX QUAD/FLULAVAL QUAD) injection 0.5 mL  0.5 mL IntraMUSCular PRIOR TO DISCHARGE  metroNIDAZOLE (FLAGYL) IVPB premix 500 mg  500 mg IntraVENous Q6H  
 potassium chloride (K-DUR, KLOR-CON) SR tablet 20 mEq  20 mEq Oral BID  
 donepezil (ARICEPT) tablet 10 mg  10 mg Oral QHS  rosuvastatin (CRESTOR) tablet 10 mg  10 mg Oral QHS  memantine (NAMENDA) tablet 5 mg  5 mg Oral BID  sodium chloride (NS) flush 5-10 mL  5-10 mL IntraVENous Q8H  
 sodium chloride (NS) flush 5-10 mL  5-10 mL IntraVENous PRN  
 acetaminophen (TYLENOL) tablet 650 mg  650 mg Oral Q4H PRN  
 ondansetron (ZOFRAN) injection 4 mg  4 mg IntraVENous Q4H PRN  
 HYDROcodone-acetaminophen (NORCO) 5-325 mg per tablet 1 Tab  1 Tab Oral Q4H PRN  
 morphine injection 1 mg  1 mg IntraVENous Q4H PRN  
 hydrALAZINE (APRESOLINE) 20 mg/mL injection 10 mg  10 mg IntraVENous Q6H PRN  
 calcium-vitamin D (OS-WILNER) 500 mg-200 unit tablet  1 Tab Oral DAILY WITH BREAKFAST  folic acid (FOLVITE) tablet 0.5 mg  0.5 mg Oral DAILY  cefTRIAXone (ROCEPHIN) 2 g in 0.9% sodium chloride (MBP/ADV) 50 mL  2 g IntraVENous Q12H Diet: DIET MECHANICAL SOFT Other Studies (last 24 hours): No results found. Assessment and Plan:  
 
Hospital Problems as of 11/13/2018 Date Reviewed: 12/24/2013 Codes Class Noted - Resolved POA Altered mental status ICD-10-CM: R41.82 
ICD-9-CM: 780.97  11/8/2018 - Present MORIS (acute kidney injury) ([facility] [address] ICD-10-CM: N17.9 ICD-9-CM: 584.9  11/8/2018 - Present Yes * (Principal) Acute encephalopathy ICD-10-CM: G93.40 ICD-9-CM: 348.30  12/12/2013 - Present Yes Malfunction of ventriculoperitoneal shunt (HCC) ICD-10-CM: C57.08WQ ICD-9-CM: 996.2 Acute 6/9/2013 - Present Yes Hypertension ICD-10-CM: I10 
ICD-9-CM: 401.9 Chronic 6/9/2013 - Present Yes A/P:   
Ventriculoperitoneal shunt malfunction/infection S/p removal of left ventriculoperitoneal shunt and right ventricular catheter and placement of a right external ventricular drain on 11/8 
- neurosurgery re: shunt and antibiotics appreciated --csf cultured today- 
  
MORIS Creatinine on admission 1.8-- improved 11/12 was 1.18--follow 
  
HTN 
- holding lisinopril, HCTZ given MORIS 
- Norvasc, Hydralazine--fair control follow 
- PRN hydralazine for BP control 
  
  
Proph - SCDs Full code Dispo - per NS

## 2018-11-13 NOTE — CONSULTS
LEAPFROG PROTOCOL NOTE    [facility]  11/13/2018    The patient is currently in the critical care setting managed by Dr. Gonzalo Larson and neurosurgery. The patient's chart is reviewed and the patient is discussed with the staff. Patient is currently hemodynamically stable and on room air. Patient has no needs identified for Intensivist management in the critical care setting at this time. Please notify us if can be of assistance.       BERTIN Qureshi Ed, MD

## 2018-11-13 NOTE — PROGRESS NOTES
Dr Ginette Pulido in to see pt. Collected 10 ml CSF via EVD. Labeled tubes with pt labels and test labels. Walked to [facility] and delivered.

## 2018-11-13 NOTE — PROGRESS NOTES
Report received from [facility]. Pt alert and oriented x4. Sitting up in bed, watching tv. EVD in place. Drainage output and ICP measured. Pt denies pain. No needs expressed at this time. Will continue to monitor closely.

## 2018-11-13 NOTE — PROGRESS NOTES
Pt had soft brown/green stool incontinent. Kiara care done, puir wick changed and adult brief. Pt turned and repositioned in bed.

## 2018-11-13 NOTE — PROGRESS NOTES
Attempted to feed pt lunch. Pt awoke to verbal and tactile stimulus but was unable to remain awake during feeding. Pt opened eyes and responded to questions but quickly fell back asleep.   ICP was 7

## 2018-11-14 LAB
ANION GAP SERPL CALC-SCNC: 7 MMOL/L (ref 7–16)
BASOPHILS # BLD: 0 K/UL (ref 0–0.2)
BASOPHILS NFR BLD: 0 % (ref 0–2)
BUN SERPL-MCNC: 10 MG/DL (ref 8–23)
CALCIUM SERPL-MCNC: 8.4 MG/DL (ref 8.3–10.4)
CHLORIDE SERPL-SCNC: 106 MMOL/L (ref 98–107)
CO2 SERPL-SCNC: 26 MMOL/L (ref 21–32)
CREAT SERPL-MCNC: 1.1 MG/DL (ref 0.6–1)
DIFFERENTIAL METHOD BLD: ABNORMAL
EOSINOPHIL # BLD: 0.2 K/UL (ref 0–0.8)
EOSINOPHIL NFR BLD: 2 % (ref 0.5–7.8)
ERYTHROCYTE [DISTWIDTH] IN BLOOD BY AUTOMATED COUNT: 15.7 %
GLUCOSE SERPL-MCNC: 110 MG/DL (ref 65–100)
HCT VFR BLD AUTO: 30.4 % (ref 35.8–46.3)
HGB BLD-MCNC: 9.5 G/DL (ref 11.7–15.4)
IMM GRANULOCYTES # BLD: 0.1 K/UL (ref 0–0.5)
IMM GRANULOCYTES NFR BLD AUTO: 1 % (ref 0–5)
LYMPHOCYTES # BLD: 0.9 K/UL (ref 0.5–4.6)
LYMPHOCYTES NFR BLD: 11 % (ref 13–44)
MCH RBC QN AUTO: 26.2 PG (ref 26.1–32.9)
MCHC RBC AUTO-ENTMCNC: 31.3 G/DL (ref 31.4–35)
MCV RBC AUTO: 83.7 FL (ref 79.6–97.8)
MONOCYTES # BLD: 0.5 K/UL (ref 0.1–1.3)
MONOCYTES NFR BLD: 6 % (ref 4–12)
NEUTS SEG # BLD: 6.3 K/UL (ref 1.7–8.2)
NEUTS SEG NFR BLD: 80 % (ref 43–78)
NRBC # BLD: 0 K/UL (ref 0–0.2)
PLATELET # BLD AUTO: 448 K/UL (ref 150–450)
PMV BLD AUTO: 10.2 FL (ref 9.4–12.3)
POTASSIUM SERPL-SCNC: 3.8 MMOL/L (ref 3.5–5.1)
RBC # BLD AUTO: 3.63 M/UL (ref 4.05–5.2)
SODIUM SERPL-SCNC: 139 MMOL/L (ref 136–145)
VANCOMYCIN TROUGH SERPL-MCNC: 18 UG/ML (ref 5–20)
WBC # BLD AUTO: 8 K/UL (ref 4.3–11.1)

## 2018-11-14 PROCEDURE — 74011250637 HC RX REV CODE- 250/637: Performed by: FAMILY MEDICINE

## 2018-11-14 PROCEDURE — 74011250636 HC RX REV CODE- 250/636: Performed by: FAMILY MEDICINE

## 2018-11-14 PROCEDURE — 74011250637 HC RX REV CODE- 250/637: Performed by: INTERNAL MEDICINE

## 2018-11-14 PROCEDURE — 74011000258 HC RX REV CODE- 258: Performed by: NEUROLOGICAL SURGERY

## 2018-11-14 PROCEDURE — 80048 BASIC METABOLIC PNL TOTAL CA: CPT

## 2018-11-14 PROCEDURE — 74011250636 HC RX REV CODE- 250/636: Performed by: NEUROLOGICAL SURGERY

## 2018-11-14 PROCEDURE — 65620000000 HC RM CCU GENERAL

## 2018-11-14 PROCEDURE — 80202 ASSAY OF VANCOMYCIN: CPT

## 2018-11-14 PROCEDURE — 36415 COLL VENOUS BLD VENIPUNCTURE: CPT

## 2018-11-14 PROCEDURE — 85025 COMPLETE CBC W/AUTO DIFF WBC: CPT

## 2018-11-14 RX ADMIN — Medication 10 ML: at 21:55

## 2018-11-14 RX ADMIN — METRONIDAZOLE 500 MG: 500 INJECTION, SOLUTION INTRAVENOUS at 05:06

## 2018-11-14 RX ADMIN — VANCOMYCIN HYDROCHLORIDE 1500 MG: 10 INJECTION, POWDER, LYOPHILIZED, FOR SOLUTION INTRAVENOUS at 15:40

## 2018-11-14 RX ADMIN — HYDRALAZINE HYDROCHLORIDE 50 MG: 50 TABLET, FILM COATED ORAL at 08:10

## 2018-11-14 RX ADMIN — AMLODIPINE BESYLATE 10 MG: 10 TABLET ORAL at 08:11

## 2018-11-14 RX ADMIN — Medication 10 ML: at 05:07

## 2018-11-14 RX ADMIN — FOLIC ACID 0.5 MG: 1 TABLET ORAL at 08:11

## 2018-11-14 RX ADMIN — METRONIDAZOLE 500 MG: 500 INJECTION, SOLUTION INTRAVENOUS at 00:10

## 2018-11-14 RX ADMIN — MEMANTINE HYDROCHLORIDE 5 MG: 5 TABLET ORAL at 17:08

## 2018-11-14 RX ADMIN — HYDRALAZINE HYDROCHLORIDE 50 MG: 50 TABLET, FILM COATED ORAL at 21:55

## 2018-11-14 RX ADMIN — METRONIDAZOLE 500 MG: 500 INJECTION, SOLUTION INTRAVENOUS at 17:58

## 2018-11-14 RX ADMIN — METRONIDAZOLE 500 MG: 500 INJECTION, SOLUTION INTRAVENOUS at 12:23

## 2018-11-14 RX ADMIN — CEFTRIAXONE SODIUM 2 G: 2 INJECTION, POWDER, FOR SOLUTION INTRAMUSCULAR; INTRAVENOUS at 07:46

## 2018-11-14 RX ADMIN — METRONIDAZOLE 500 MG: 500 INJECTION, SOLUTION INTRAVENOUS at 23:02

## 2018-11-14 RX ADMIN — DONEPEZIL HYDROCHLORIDE 10 MG: 5 TABLET, FILM COATED ORAL at 21:54

## 2018-11-14 RX ADMIN — MEMANTINE HYDROCHLORIDE 5 MG: 5 TABLET ORAL at 08:11

## 2018-11-14 RX ADMIN — POTASSIUM CHLORIDE 20 MEQ: 20 TABLET, EXTENDED RELEASE ORAL at 08:10

## 2018-11-14 RX ADMIN — ROSUVASTATIN CALCIUM 10 MG: 5 TABLET, FILM COATED ORAL at 21:55

## 2018-11-14 RX ADMIN — HYDRALAZINE HYDROCHLORIDE 50 MG: 50 TABLET, FILM COATED ORAL at 17:08

## 2018-11-14 RX ADMIN — CALCIUM CARBONATE 500 MG (1,250 MG)-VITAMIN D3 200 UNIT TABLET 1 TABLET: at 08:10

## 2018-11-14 RX ADMIN — POTASSIUM CHLORIDE 20 MEQ: 20 TABLET, EXTENDED RELEASE ORAL at 17:08

## 2018-11-14 RX ADMIN — CEFTRIAXONE SODIUM 2 G: 2 INJECTION, POWDER, FOR SOLUTION INTRAMUSCULAR; INTRAVENOUS at 19:43

## 2018-11-14 RX ADMIN — Medication 10 ML: at 15:40

## 2018-11-14 NOTE — PROGRESS NOTES
Bedside shift change report given to Stephan Eduardo (oncoming nurse) by Foreign Pradhan (offgoing nurse). Report included the following information SBAR, Procedure Summary, Intake/Output, MAR, Recent Results and Cardiac Rhythm NSR. Pt resting in bed. Eyes open to speech,  Oriented to person but not place, time, situation. VSS, drain patent and producing clear yellow fluid. Assessment completed with no significant changes detected.

## 2018-11-14 NOTE — PROGRESS NOTES
NEUROSURGERY PROGRESS NOTE:  
Admit Date: 11/8/2018 Subjective: No acute overnight events. Objective: 
Visit Vitals /72 Pulse 66 Temp 97.5 °F (36.4 °C) Resp 13 Ht 5' 6\" (1.676 m) Wt 231 lb 4.2 oz (104.9 kg) SpO2 99% Breastfeeding? No  
BMI 37.33 kg/m² General: No acute distress Awake, alert, and oriented to person, Hospital, not president, oriented to year Eyes open spontaneously PERRL, EOMI with slight exotropia of the left eye at baseline Face symmetric and tongue mid-line on protrusion Patient with 5/5 strength in the bilateral upper and bilateral lower extremities Incisions: Clean, dry, and intact with dressing in place, left EVD exit site with biopatch in place with 224 cc output Assessment and Plan:  
Johann Gomez 59 y.o. female who is now 10 Days Post-Op from removal of her left ventriculoperitoneal shunt and right ventricular catheter and placement of a right external ventricular drain for treatment of ventriculoperitoneal shunt malfunction/infection. 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 Will plan for ventriculoperitoneal shunt placement with general surgery assistance on Tuesday 11/20/2018 if cultures remain negative - Continue ICU level care - Can do q2H neuro checks - Continue SCDs bilaterally for DVT Prophylaxis - Continue Diet  
- Contiue EVD at 10 cmH2O call if drain and measure ICP q1H 
- CSF no growth to date with a benign profile. - Please call with questions or concerns Juwan Lofton. [facility]

## 2018-11-14 NOTE — INTERDISCIPLINARY ROUNDS
Interdisciplinary team rounds were held 11/14/2018 with the following team members:Care Management, Nursing, Nurse Practitioner, Nutrition, Occupational Therapy, Palliative Care, Pastoral Care, Patient Relations, Pharmacy, Physical Therapy, Physician, Respiratory Therapy and Clinical Coordinator and the patient. Plan of care discussed. See clinical pathway and/or care plan for interventions and desired outcomes.

## 2018-11-14 NOTE — PROGRESS NOTES
Bedside and Verbal shift change report given to JEFF Kyle (oncoming nurse) by Jean Pierre Fitzpatrick RN (offgoing nurse). Report included the following information SBAR, Kardex, ED Summary, Procedure Summary, Intake/Output, MAR, Recent Results and Cardiac Rhythm NSR. Pt resting in bed, A&O X4, on room air O2 sat %. Pt suffers from short term memory loss. Incision on front of the head without dressing, CDI. L lateral head incision where ventric drain is intact, dressing CDI. NSR on monitor HR 74 and /62. Lung sounds clear. Stomach semi soft upon palpation and bowel sounds active. Purewick in place and draining. SCD's bilaterally. Pt states no HA/pain at this time. Call light in reach.

## 2018-11-14 NOTE — PROGRESS NOTES
Hospitalist Progress Note Admit Date:  2018  6:44 AM  
Name:  Saroj Pond Age:  59 y.o. 
:  1955 MRN:  [id number] PCP:  Montrell Calvillo MD 
Treatment Team: Attending Provider: Ramez Joseph MD; Consulting Provider: Carito Dutton MD; Care Manager: Nida De Jesus RN; Utilization Review: Saddie Cushing, RN Subjective:  
CC:no new complains or gross distress. Eating. Objective:  
 
Patient Vitals for the past 24 hrs: 
 Temp Pulse Resp BP SpO2  
18 1533  68 12 143/65 99 % 18 1501  72 14 130/82 100 % 18 1434  83 16 119/64 100 % 18 1402  72 15 130/65 100 % 18 1332  80 18 94/53 100 % 18 1306 96.6 °F (35.9 °C)      
18 1302  79 17 124/68 99 % 18 1233 96.6 °F (35.9 °C) 98 23 143/69 99 % 18 1202  73 16 134/61 100 % 18 1133  87 27 126/66 98 % 18 1102  92 22 128/62 99 % 18 1033  78 16 131/64 100 % 18 1002  78 17 163/77 100 % 18 0933  75 20 153/72 100 % 18 0902  (!) 141 26 156/67 100 % 18 0832  68 16 145/68 100 % 18 0802  63 16 138/67 100 % 18 0732 96.5 °F (35.8 °C) 66 21 149/69 99 % 18 0701  68 12 154/74 100 % 18 0632  67 20 156/72 99 % 18 0532  66 13 153/72 99 % 18 0501  62 12 157/71 100 % 18 0432  66 14 144/67 98 % 18 0332 97.5 °F (36.4 °C) 69 13 142/67 99 % 11/14/18 0301  72 14 130/65 99 % 18 0232  66 15 133/62 99 % 18 0201  70 17 154/69 99 % 18 0133  70 22 129/63 99 % 18 0101  76 19 141/65 99 % 18 0032  64 13 132/70 100 % 18 0001 97.5 °F (36.4 °C) 65 13 132/71 100 % 18 2333  75 15 140/65 100 % 18 2301  73 19 131/62 99 % 18 2233  73 19 143/67 99 % 18 2201  76 26 119/62 99 % 18 2132  76 18 129/63 99 % 18 2101  66 13 126/58 99 % 18 2032  69 14 125/58 99 % 11/13/18 2001  73 18 127/63 99 % 11/13/18 1933  67 13 123/59 99 % 11/13/18 1901 97.5 °F (36.4 °C) 78 22 135/64 99 % Oxygen Therapy O2 Sat (%): 99 % (11/14/18 1533) Pulse via Oximetry: 69 beats per minute (11/14/18 1533) O2 Device: Room air (11/14/18 0732) O2 Flow Rate (L/min): 2 l/min (11/10/18 1901) Intake/Output Summary (Last 24 hours) at 11/14/2018 1829 Last data filed at 11/14/2018 1810 Gross per 24 hour Intake 2708 ml Output 4436 ml Net -1728 ml Physical Examination: 
 
General:          Alert person place easily distracted Head:               Normocephalic, atraumatic--external shunt noted Eyes:               Extraocular movements intact, normal sclera CV:                  RRR. No  Murmurs, clicks, or gallops Lungs:             Unlabored, no cyanosis Abdomen:        Soft, nondistended, nontender. Extremities:     Warm and dry. No cyanosis or edema. Skin:                No rashes or jaundice. Neuro:             No gross focal deficits Psych:             cooperative Data Review: 
I have reviewed all labs, meds, telemetry events, and studies from the last 24 hours. Recent Results (from the past 24 hour(s)) METABOLIC PANEL, BASIC Collection Time: 11/14/18  3:30 AM  
Result Value Ref Range Sodium 139 136 - 145 mmol/L Potassium 3.8 3.5 - 5.1 mmol/L Chloride 106 98 - 107 mmol/L  
 CO2 26 21 - 32 mmol/L Anion gap 7 7 - 16 mmol/L Glucose 110 (H) 65 - 100 mg/dL BUN 10 8 - 23 MG/DL Creatinine 1.10 (H) 0.6 - 1.0 MG/DL  
 GFR est AA >60 >60 ml/min/1.73m2 GFR est non-AA 53 (L) >60 ml/min/1.73m2 Calcium 8.4 8.3 - 10.4 MG/DL  
CBC WITH AUTOMATED DIFF Collection Time: 11/14/18  3:30 AM  
Result Value Ref Range WBC 8.0 4.3 - 11.1 K/uL  
 RBC 3.63 (L) 4.05 - 5.2 M/uL HGB 9.5 (L) 11.7 - 15.4 g/dL HCT 30.4 (L) 35.8 - 46.3 % MCV 83.7 79.6 - 97.8 FL  
 MCH 26.2 26.1 - 32.9 PG  
 MCHC 31.3 (L) 31.4 - 35.0 g/dL  
 RDW 15.7 % PLATELET 732 464 - 574 K/uL MPV 10.2 9.4 - 12.3 FL ABSOLUTE NRBC 0.00 0.0 - 0.2 K/uL  
 DF AUTOMATED NEUTROPHILS 80 (H) 43 - 78 % LYMPHOCYTES 11 (L) 13 - 44 % MONOCYTES 6 4.0 - 12.0 % EOSINOPHILS 2 0.5 - 7.8 % BASOPHILS 0 0.0 - 2.0 % IMMATURE GRANULOCYTES 1 0.0 - 5.0 %  
 ABS. NEUTROPHILS 6.3 1.7 - 8.2 K/UL  
 ABS. LYMPHOCYTES 0.9 0.5 - 4.6 K/UL  
 ABS. MONOCYTES 0.5 0.1 - 1.3 K/UL  
 ABS. EOSINOPHILS 0.2 0.0 - 0.8 K/UL  
 ABS. BASOPHILS 0.0 0.0 - 0.2 K/UL  
 ABS. IMM. GRANS. 0.1 0.0 - 0.5 K/UL  
VANCOMYCIN, TROUGH Collection Time: 11/14/18  2:53 PM  
Result Value Ref Range Vancomycin,trough 18.0 5 - 20 ug/mL All Micro Results Procedure Component Value Units Date/Time CULTURE, CSF Chelo Mohamud [[id number]] Collected:  11/13/18 1516 Order Status:  Completed Specimen:  Cerebrospinal Fluid Updated:  11/14/18 6218 Special Requests: NO SPECIAL REQUESTS     
  GRAM STAIN NO WBCS SEEN     
   NO DEFINITE ORGANISM SEEN Culture result: NO GROWTH 1 DAY     
 CULTURE, CSF Ismael Stark STAIN [[id number]] Collected:  11/08/18 0912 Order Status:  Completed Specimen:  Cerebrospinal Fluid Updated:  11/13/18 0725 Special Requests: NO SPECIAL REQUESTS     
  GRAM STAIN 0 TO 3 WBCS/OIF  
   NO DEFINITE ORGANISM SEEN Culture result: NO GROWTH 5 DAYS     
 CULTURE, BLOOD [[id number]] Collected:  11/08/18 0836 Order Status:  Completed Specimen:  Blood Updated:  11/13/18 0719 Special Requests: --     
  LEFT 
FOREARM Culture result: NO GROWTH 5 DAYS     
 CULTURE, BLOOD [[id number]] Collected:  11/08/18 1081 Order Status:  Completed Specimen:  Blood Updated:  11/13/18 0719 Special Requests: --     
  RIGHT 
FOREARM Culture result: NO GROWTH 5 DAYS Current Meds: 
Current Facility-Administered Medications Medication Dose Route Frequency  amLODIPine (NORVASC) tablet 10 mg  10 mg Oral DAILY  hydrALAZINE (APRESOLINE) tablet 50 mg  50 mg Oral TID  labetalol (NORMODYNE;TRANDATE) injection 20 mg  20 mg IntraVENous Q6H PRN  
 vancomycin (VANCOCIN) 1500 mg in  ml infusion  1,500 mg IntraVENous Q18H  
 influenza vaccine 2018-19 (6 mos+)(PF) (FLUARIX QUAD/FLULAVAL QUAD) injection 0.5 mL  0.5 mL IntraMUSCular PRIOR TO DISCHARGE  metroNIDAZOLE (FLAGYL) IVPB premix 500 mg  500 mg IntraVENous Q6H  
 potassium chloride (K-DUR, KLOR-CON) SR tablet 20 mEq  20 mEq Oral BID  
 donepezil (ARICEPT) tablet 10 mg  10 mg Oral QHS  rosuvastatin (CRESTOR) tablet 10 mg  10 mg Oral QHS  memantine (NAMENDA) tablet 5 mg  5 mg Oral BID  sodium chloride (NS) flush 5-10 mL  5-10 mL IntraVENous Q8H  
 sodium chloride (NS) flush 5-10 mL  5-10 mL IntraVENous PRN  
 acetaminophen (TYLENOL) tablet 650 mg  650 mg Oral Q4H PRN  
 ondansetron (ZOFRAN) injection 4 mg  4 mg IntraVENous Q4H PRN  
 HYDROcodone-acetaminophen (NORCO) 5-325 mg per tablet 1 Tab  1 Tab Oral Q4H PRN  
 morphine injection 1 mg  1 mg IntraVENous Q4H PRN  
 hydrALAZINE (APRESOLINE) 20 mg/mL injection 10 mg  10 mg IntraVENous Q6H PRN  
 calcium-vitamin D (OS-WILNER) 500 mg-200 unit tablet  1 Tab Oral DAILY WITH BREAKFAST  folic acid (FOLVITE) tablet 0.5 mg  0.5 mg Oral DAILY  cefTRIAXone (ROCEPHIN) 2 g in 0.9% sodium chloride (MBP/ADV) 50 mL  2 g IntraVENous Q12H Diet: DIET MECHANICAL SOFT Other Studies (last 24 hours): No results found. Assessment and Plan:  
 
Hospital Problems as of 11/14/2018 Date Reviewed: 12/24/2013 Codes Class Noted - Resolved POA Altered mental status ICD-10-CM: R41.82 
ICD-9-CM: 780.97  11/8/2018 - Present MORIS (acute kidney injury) ([facility] [address] ICD-10-CM: N17.9 ICD-9-CM: 584.9  11/8/2018 - Present Yes * (Principal) Acute encephalopathy ICD-10-CM: G93.40 ICD-9-CM: 348.30  12/12/2013 - Present Yes Malfunction of ventriculoperitoneal shunt (HCC) ICD-10-CM: A65.13KM ICD-9-CM: 996.2 Acute 6/9/2013 - Present Yes Hypertension ICD-10-CM: I10 
ICD-9-CM: 401.9 Chronic 6/9/2013 - Present Yes A/P:   
Ventriculoperitoneal shunt malfunction/infection S/p removal of left ventriculoperitoneal shunt and right ventricular catheter and placement of a right external ventricular drain on 11/8 
- neurosurgery re: shunt and antibiotics appreciated --csf cultured 11/13-ngtd From neurosurg. Note today re: follow up / plan: 
Elvis Bernal 59 y.o. female who is now 6 Days Post-Op from removal of her left ventriculoperitoneal shunt and right ventricular catheter and placement of a right external ventricular drain for treatment of ventriculoperitoneal shunt malfunction/infection. 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 Will plan for ventriculoperitoneal shunt placement with general surgery assistance on Tuesday 11/20/2018 if cultures remain negative - Continue ICU level care - Can do q2H neuro checks - Continue SCDs bilaterally for DVT Prophylaxis - Continue Diet  
- Contiue EVD at 10 cmH2O call if drain and measure ICP q1H 
- CSF no growth to date with a benign profile. - Please call with questions or concerns 
  
[facility] 
 
 
 
  
MORIS Creatinine on admission 1.8-- resolved 
  
HTN 
- holding lisinopril, HCTZ given MORIS--bp ok - Norvasc, Hydralazine 
- PRN hydralazine for BP control 
  
  
Proph - SCDs Full code Dispo - per NS 
 
 
  
MORIS Creatinine on admission 1.8-- improved 11/12 was 1.18--follow 
  
HTN 
- holding lisinopril, HCTZ given MORIS 
- Norvasc, Hydralazine--fair control follow 
- PRN hydralazine for BP control 
  
  
Proph - SCDs Full code Dispo - per NS

## 2018-11-14 NOTE — PROGRESS NOTES
Chart reviewed as pt remains in CCU. She is only alert to self currently. Per Dr. Loli Birch is cultures negative, will plan for ventriculoperitoneal shunt placement on Tuesday 11/20/2018. Continue ICU currently. CM following for any assist and d/c POC.

## 2018-11-14 NOTE — PROGRESS NOTES
Pt resting quietly in bed watching TV. Appetite is good. Frequently asks for yellow candy. Pt able to feed self for breakfast and lunch. Neuro status unchanged. EVD patent and draining. Fluid serous and yellow tinted.

## 2018-11-15 PROCEDURE — 74011250637 HC RX REV CODE- 250/637: Performed by: FAMILY MEDICINE

## 2018-11-15 PROCEDURE — 74011250637 HC RX REV CODE- 250/637: Performed by: INTERNAL MEDICINE

## 2018-11-15 PROCEDURE — 65620000000 HC RM CCU GENERAL

## 2018-11-15 PROCEDURE — 74011250636 HC RX REV CODE- 250/636: Performed by: FAMILY MEDICINE

## 2018-11-15 PROCEDURE — 74011000258 HC RX REV CODE- 258: Performed by: NEUROLOGICAL SURGERY

## 2018-11-15 PROCEDURE — 77030018836 HC SOL IRR NACL ICUM -A

## 2018-11-15 PROCEDURE — 74011250636 HC RX REV CODE- 250/636: Performed by: NEUROLOGICAL SURGERY

## 2018-11-15 PROCEDURE — 77030018719 HC DRSG PTCH ANTIMIC J&J -A

## 2018-11-15 RX ADMIN — HYDRALAZINE HYDROCHLORIDE 50 MG: 50 TABLET, FILM COATED ORAL at 18:08

## 2018-11-15 RX ADMIN — FOLIC ACID 0.5 MG: 1 TABLET ORAL at 08:43

## 2018-11-15 RX ADMIN — CEFTRIAXONE SODIUM 2 G: 2 INJECTION, POWDER, FOR SOLUTION INTRAMUSCULAR; INTRAVENOUS at 18:10

## 2018-11-15 RX ADMIN — Medication 10 ML: at 06:00

## 2018-11-15 RX ADMIN — METRONIDAZOLE 500 MG: 500 INJECTION, SOLUTION INTRAVENOUS at 11:27

## 2018-11-15 RX ADMIN — CEFTRIAXONE SODIUM 2 G: 2 INJECTION, POWDER, FOR SOLUTION INTRAMUSCULAR; INTRAVENOUS at 08:44

## 2018-11-15 RX ADMIN — HYDRALAZINE HYDROCHLORIDE 50 MG: 50 TABLET, FILM COATED ORAL at 21:13

## 2018-11-15 RX ADMIN — AMLODIPINE BESYLATE 10 MG: 10 TABLET ORAL at 08:43

## 2018-11-15 RX ADMIN — ROSUVASTATIN CALCIUM 10 MG: 5 TABLET, FILM COATED ORAL at 21:14

## 2018-11-15 RX ADMIN — Medication 10 ML: at 15:56

## 2018-11-15 RX ADMIN — DONEPEZIL HYDROCHLORIDE 10 MG: 5 TABLET, FILM COATED ORAL at 21:14

## 2018-11-15 RX ADMIN — METRONIDAZOLE 500 MG: 500 INJECTION, SOLUTION INTRAVENOUS at 05:02

## 2018-11-15 RX ADMIN — VANCOMYCIN HYDROCHLORIDE 1500 MG: 10 INJECTION, POWDER, LYOPHILIZED, FOR SOLUTION INTRAVENOUS at 13:19

## 2018-11-15 RX ADMIN — Medication 10 ML: at 21:14

## 2018-11-15 RX ADMIN — POTASSIUM CHLORIDE 20 MEQ: 20 TABLET, EXTENDED RELEASE ORAL at 08:44

## 2018-11-15 RX ADMIN — MEMANTINE HYDROCHLORIDE 5 MG: 5 TABLET ORAL at 08:44

## 2018-11-15 RX ADMIN — MEMANTINE HYDROCHLORIDE 5 MG: 5 TABLET ORAL at 18:09

## 2018-11-15 RX ADMIN — POTASSIUM CHLORIDE 20 MEQ: 20 TABLET, EXTENDED RELEASE ORAL at 18:09

## 2018-11-15 RX ADMIN — HYDRALAZINE HYDROCHLORIDE 50 MG: 50 TABLET, FILM COATED ORAL at 08:44

## 2018-11-15 RX ADMIN — CALCIUM CARBONATE 500 MG (1,250 MG)-VITAMIN D3 200 UNIT TABLET 1 TABLET: at 08:44

## 2018-11-15 RX ADMIN — METRONIDAZOLE 500 MG: 500 INJECTION, SOLUTION INTRAVENOUS at 18:09

## 2018-11-15 NOTE — PROGRESS NOTES
Interdisciplinary team rounds were held 11/15/2018 with the following team members:Care Management, Nursing, Nurse Practitioner, Nutrition, Palliative Care, Pastoral Care, Pharmacy and Physician's Assistant and the patient. Plan of care discussed. See clinical pathway and/or care plan for interventions and desired outcomes.

## 2018-11-15 NOTE — PROGRESS NOTES
Problem: Nutrition Deficit Goal: *Optimize nutritional status Nutrition: 
Assessment based on LOS. Assessment: Anthropometrics: Ht - 5'6\", wgt - 109.1 kg (CCU bed 11/15/18), BMI 38.8 c/w obesity class II, edema - none reported. Macronutrient Needs: 
Estimated calorie needs - 0106-1814 hector/day (11-15 hector/kg/day) Estimated protein needs - 47-71 gm pro/day (0.8-1.2 gm pro/kgIBW/day) (GFR >60 ml/min) Intake/Comparative Standards: This patient's average intake of mechanical soft diet for the past 7 recorded days/11 meals: 89%. This potentially meets >100% of calorie and >100% of protein goals. Diet: 
 Mechanical soft. Pertinent Medications: 
 Flagyl, Vancomycin. Food/Nutrition History: 
 The patient presents with no acute nutrition risk factors based on the nursing admission malnutrition screen. Initially the patient needed to be fed, but as mental status cleared, she has been able to feed herself without any difficulty. Diagnosis (Nutrition): No nutrition diagnosis at this time. Intervention: 
Meals and Snacks: Mechanical soft as tolerated. Coordination of Nutrition Care by a Nutrition Professional: AM CCU rounds. Nutrition Discharge Plan: Too soon to determine. Guero Smoker. [facility] 
[phone]

## 2018-11-15 NOTE — PROGRESS NOTES
Bedside and Verbal shift change report given to Jaquelin Zamora RN (oncoming nurse) by Abbey Tilley RN (offgoing nurse). Report included the following information SBAR, Kardex, ED Summary, Procedure Summary, Intake/Output, MAR, Recent Results and Cardiac Rhythm NSR. Pt resting in bed, on room air with O2 sat , alert and oriented to person, place, situation but disoriented to time. Dual neuro assessment completed with JEFF Granados. L lateral ventric drain in place, CDI. Ventric dressing intact but loose. Two incisions on anterior of head, CDI. Incision on L upper chest, CDI. NSR on monitor HR 79 /70. Lung sounds clear. Stomach obese, soft upon palpation and bowel sounds active. Purewick in place and draining. SCD's in place. Pt states no HA/pain at this time. Call light in reach.

## 2018-11-15 NOTE — PROGRESS NOTES
NEUROSURGERY 
 
POD#7 S/P REMOVAL OF LEFT  SHUNT 
EVD DRAINING SEROSANGUINOUS/CLEAR FLUID 
RECENT ICP 10 PER NURSING 
A/O TO SELF, , YEAR 
NO PRONATOR DRIFT, PERRLA, POWER 5/5 BUE, BLE 
FACE SYMMETRICAL, TONGUE MIDLINE INCISIONS C/D/I A/P 
MONITOR DRAIN OUTPUT--NO MORE THAN 20 ML PER HOUR 
NEURO CHECKS, MONITOR ICP EVERY HOUR 
CULTURES HAVE REMAINED NEGATIVE 
TO THE OR ON Tuesday NEXT WEEK  FOR  SHUNT PLACEMENT 
DR. KOTHARI WILL SEE ON  SHILA Christopher/DR. Orlando

## 2018-11-15 NOTE — PROGRESS NOTES
Bedside report with JEFF kraus. Dual neuro and evd assessment completed. ICP zeroed with reading of 10. Pt oriented to person, place and follows commands, KATHIA vallecillo. Pt does appear to have short term memory deficits but report this is not new. 
evd draining serosanguinous to clear fluid.

## 2018-11-15 NOTE — PROGRESS NOTES
Patty Luna, NP neurosx at bedside, updated on pt with neuro assessment repeated. ICP 5. No verbal orders received.

## 2018-11-15 NOTE — PROGRESS NOTES
Drain turned off towards pt, repositioned and cleaned of incontinent stool, placed bed in chair position and leveled evd to tragus, opened drain. Pt denies needs at this time.  Vss.

## 2018-11-15 NOTE — PROGRESS NOTES
Pharmacokinetic Consult to Pharmacist 
 
Rigoberto Carolyn is a 59 y.o. 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 Height: 5' 6\" (167.6 cm)  Weight: 109.1 kg (240 lb 8.4 oz) Lab Results Component Value Date/Time BUN 10 11/14/2018 03:30 AM  
 Creatinine 1.10 (H) 11/14/2018 03:30 AM  
 WBC 8.0 11/14/2018 03:30 AM  
 Procalcitonin <0.1 11/08/2018 08:19 AM  
 Lactic acid 1.8 12/12/2013 05:10 AM  
 Lactic Acid (POC) 1.13 11/08/2018 08:16 AM  
  
Estimated Creatinine Clearance: 66.3 mL/min (A) (based on SCr of 1.1 mg/dL (H)). Lab Results Component Value Date/Time Vancomycin,trough 18.0 11/14/2018 02:53 PM  
 
 
Day 8 of vancomycin. Goal trough is 15-20. Continue current dosing regimen. Thank you, Kim Vega, PharmD Clinical Pharmacist 
[phone]

## 2018-11-15 NOTE — PROGRESS NOTES
Hospitalist Progress Note Admit Date:  2018  6:44 AM  
Name:  Pratibha Wells Age:  59 y.o. 
:  1955 MRN:  [id number] PCP:  Montrell Calvillo MD 
Treatment Team: Attending Provider: Edith David MD; Consulting Provider: Tarun Mccoy MD; Care Manager: Rito Cali RN; Utilization Review: Radha Soto RN Subjective:  
CC: No new complaints more alert still but she thought Yady Roman was the president. Alert to person place and month. Shunt left skull site clean she reports analgesia effective. Objective:  
 
Patient Vitals for the past 24 hrs: 
 Temp Pulse Resp BP SpO2  
11/15/18 1431  74 15 147/79 99 % 11/15/18 1401    148/66   
11/15/18 1400  75 15  98 % 11/15/18 1331  74 15 140/71 99 % 11/15/18 1305  71 16 125/67 99 % 11/15/18 1232  75 18 131/61 100 % 11/15/18 1207  67 14  100 % 11/15/18 1202  63 13 117/58 100 % 11/15/18 1146  70 12 121/58 99 % 11/15/18 1101  68 14 156/67 100 % 11/15/18 1031  71 14 140/67 100 % 11/15/18 1004  71 15 143/71 99 % 11/15/18 0932  71 14 152/74 99 % 11/15/18 0902  68 14 148/87 99 % 11/15/18 0845  66 14  99 % 11/15/18 0843  69  132/62   
11/15/18 0832  67 13 132/62 100 % 11/15/18 0801  75 20 (!) 163/93 100 % 11/15/18 0732  63 11  99 % 11/15/18 0731    137/75   
11/15/18 0702  63 15 135/74 99 % 11/15/18 0659 97.4 °F (36.3 °C) 70 16 135/74 100 % 11/15/18 0632  62 14 148/69 99 % 11/15/18 0601  69 16 146/70 99 % 11/15/18 0532  66 15 153/72 99 % 11/15/18 0502  72 12 (!) 167/91 100 % 11/15/18 0431  60 12 147/70 100 % 11/15/18 0401  68 13 148/72 100 % 11/15/18 0331  62 11 144/71 100 % 11/15/18 0301 97.5 °F (36.4 °C) 69 23 152/72 100 % 11/15/18 0232  68 15 150/72 100 % 11/15/18 0201  90 19 144/68 99 % 11/15/18 0132  62 11 141/70 100 % 11/15/18 0102  73 18 139/65 99 % 11/15/18 0032  86 (!) 53 156/71 100 % 11/15/18 0001  68 14 146/63 98 % 11/14/18 2333  74 17 135/66 98 % 11/14/18 2302 97.7 °F (36.5 °C) 71 16 162/76 98 % 11/14/18 2232  72 17 153/72 98 % 11/14/18 2201  72 17 135/72 98 % 11/14/18 2132  68 14 139/61 99 % 11/14/18 2101  75 16 134/60 99 % 11/14/18 2032  67 13 131/63 99 % 11/14/18 2001  72 13 142/63 99 % 11/14/18 1932  71 12 135/63 100 % 11/14/18 1901 97.7 °F (36.5 °C) 76 17 137/73 100 % Oxygen Therapy O2 Sat (%): 99 % (11/15/18 1431) Pulse via Oximetry: 75 beats per minute (11/15/18 1431) O2 Device: Room air (11/15/18 7063) O2 Flow Rate (L/min): 2 l/min (11/10/18 1901) Intake/Output Summary (Last 24 hours) at 11/15/2018 1713 Last data filed at 11/15/2018 5438 Gross per 24 hour Intake 1728 ml Output 3273 ml Net -1545 ml Physical Examination: 
General:    More alert Head:  External ventricular shunt left skull, sutures right forehead Eyes:  Extraocular movements intact, normal sclera CV:   RRR. No  Murmurs, clicks, or gallops Lungs:   Unlabored, no cyanosis Abdomen:   Soft, nondistended, nontender. Extremities: Warm and dry. No cyanosis or edema. Skin:     No rashes or jaundice. Neuro:  No gross focal deficits Data Review: 
I have reviewed all labs, meds, telemetry events, and studies from the last 24 hours. No results found for this or any previous visit (from the past 24 hour(s)). All Micro Results Procedure Component Value Units Date/Time CULTURE, CSF Jeff Blanchard [[id number]] Collected:  11/13/18 1516 Order Status:  Completed Specimen:  Cerebrospinal Fluid Updated:  11/15/18 6352 Special Requests: NO SPECIAL REQUESTS     
  GRAM STAIN NO WBCS SEEN     
   NO DEFINITE ORGANISM SEEN Culture result: NO GROWTH 2 DAYS     
 CULTURE, CSF Edward Mary STAIN [[id number]] Collected:  11/08/18 0912 Order Status:  Completed Specimen:  Cerebrospinal Fluid Updated:  11/13/18 0725   Special Requests: NO SPECIAL REQUESTS     
  GRAM STAIN 0 TO 3 WBCS/OIF  
 NO DEFINITE ORGANISM SEEN Culture result: NO GROWTH 5 DAYS     
 CULTURE, BLOOD [[id number]] Collected:  11/08/18 0836 Order Status:  Completed Specimen:  Blood Updated:  11/13/18 0719 Special Requests: --     
  LEFT 
FOREARM Culture result: NO GROWTH 5 DAYS     
 CULTURE, BLOOD [[id number]] Collected:  11/08/18 4720 Order Status:  Completed Specimen:  Blood Updated:  11/13/18 0719 Special Requests: --     
  RIGHT 
FOREARM Culture result: NO GROWTH 5 DAYS Current Meds: 
Current Facility-Administered Medications Medication Dose Route Frequency  amLODIPine (NORVASC) tablet 10 mg  10 mg Oral DAILY  hydrALAZINE (APRESOLINE) tablet 50 mg  50 mg Oral TID  labetalol (NORMODYNE;TRANDATE) injection 20 mg  20 mg IntraVENous Q6H PRN  
 vancomycin (VANCOCIN) 1500 mg in  ml infusion  1,500 mg IntraVENous Q18H  
 influenza vaccine 2018-19 (6 mos+)(PF) (FLUARIX QUAD/FLULAVAL QUAD) injection 0.5 mL  0.5 mL IntraMUSCular PRIOR TO DISCHARGE  metroNIDAZOLE (FLAGYL) IVPB premix 500 mg  500 mg IntraVENous Q6H  
 potassium chloride (K-DUR, KLOR-CON) SR tablet 20 mEq  20 mEq Oral BID  
 donepezil (ARICEPT) tablet 10 mg  10 mg Oral QHS  rosuvastatin (CRESTOR) tablet 10 mg  10 mg Oral QHS  memantine (NAMENDA) tablet 5 mg  5 mg Oral BID  sodium chloride (NS) flush 5-10 mL  5-10 mL IntraVENous Q8H  
 sodium chloride (NS) flush 5-10 mL  5-10 mL IntraVENous PRN  
 acetaminophen (TYLENOL) tablet 650 mg  650 mg Oral Q4H PRN  
 ondansetron (ZOFRAN) injection 4 mg  4 mg IntraVENous Q4H PRN  
 HYDROcodone-acetaminophen (NORCO) 5-325 mg per tablet 1 Tab  1 Tab Oral Q4H PRN  
 morphine injection 1 mg  1 mg IntraVENous Q4H PRN  
 hydrALAZINE (APRESOLINE) 20 mg/mL injection 10 mg  10 mg IntraVENous Q6H PRN  
 calcium-vitamin D (OS-WILNER) 500 mg-200 unit tablet  1 Tab Oral DAILY WITH BREAKFAST  folic acid (FOLVITE) tablet 0.5 mg  0.5 mg Oral DAILY  cefTRIAXone (ROCEPHIN) 2 g in 0.9% sodium chloride (MBP/ADV) 50 mL  2 g IntraVENous Q12H Diet: DIET MECHANICAL SOFT Other Studies (last 24 hours): No results found. Assessment and Plan:  
 
Hospital Problems as of 11/15/2018 Date Reviewed: 12/24/2013 Codes Class Noted - Resolved POA Altered mental status ICD-10-CM: R41.82 
ICD-9-CM: 780.97  11/8/2018 - Present MORIS (acute kidney injury) ([facility] [address] ICD-10-CM: N17.9 ICD-9-CM: 584.9  11/8/2018 - Present Yes * (Principal) Acute encephalopathy ICD-10-CM: G93.40 ICD-9-CM: 348.30  12/12/2013 - Present Yes Malfunction of ventriculoperitoneal shunt (HCC) ICD-10-CM: W69.18QV ICD-9-CM: 996.2 Acute 6/9/2013 - Present Yes Hypertension ICD-10-CM: I10 
ICD-9-CM: 401.9 Chronic 6/9/2013 - Present Yes A/P:   
Ventriculoperitoneal shunt malfunction/infection S/p removal of left ventriculoperitoneal shunt and right ventricular catheter and placement of a right external ventricular drain on 11/8 
- neurosurgery re: shunt and antibiotics appreciated --csf cultured 11/13-ngtd 
  
From neurosurg. Note from 11/14/18 re: follow up / plan: 
Ajit Newberry y. o. female who is now 6 Days Post-Op from removal of her left ventriculoperitoneal shunt and right ventricular catheter and placement of a right external ventricular drain for treatment of ventriculoperitoneal shunt malfunction/infection. 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 Will plan for ventriculoperitoneal shunt placement with general surgery assistance on Tuesday 11/20/2018 if cultures remain negative   
- Continue ICU level care - Can do q2H neuro checks - Continue SCDs bilaterally for DVT Prophylaxis - Continue Diet  
- Contiue EVD at 10 cmH2O call if drain and measure ICP q1H 
 - CSF no growth to date with a benign profile.  
- Please call with questions or concerns 
  
Harman Ayala, [facility] MORIS Creatinine on admission 1.8-- improved /resolved . Last bmp 11/14 
  
HTN 
- holding lisinopril, HCTZ --discontinued re: moris on admit - Norvasc, Hydralazine--control ok 
  
  
Proph - SCDs Full code Dispo - per NS

## 2018-11-16 PROCEDURE — 74011250636 HC RX REV CODE- 250/636: Performed by: FAMILY MEDICINE

## 2018-11-16 PROCEDURE — 74011250637 HC RX REV CODE- 250/637: Performed by: INTERNAL MEDICINE

## 2018-11-16 PROCEDURE — 74011250637 HC RX REV CODE- 250/637: Performed by: FAMILY MEDICINE

## 2018-11-16 PROCEDURE — 74011250636 HC RX REV CODE- 250/636: Performed by: NEUROLOGICAL SURGERY

## 2018-11-16 PROCEDURE — 65620000000 HC RM CCU GENERAL

## 2018-11-16 PROCEDURE — 74011000258 HC RX REV CODE- 258: Performed by: NEUROLOGICAL SURGERY

## 2018-11-16 RX ADMIN — CEFTRIAXONE SODIUM 2 G: 2 INJECTION, POWDER, FOR SOLUTION INTRAMUSCULAR; INTRAVENOUS at 20:53

## 2018-11-16 RX ADMIN — VANCOMYCIN HYDROCHLORIDE 1500 MG: 10 INJECTION, POWDER, LYOPHILIZED, FOR SOLUTION INTRAVENOUS at 04:26

## 2018-11-16 RX ADMIN — METRONIDAZOLE 500 MG: 500 INJECTION, SOLUTION INTRAVENOUS at 00:01

## 2018-11-16 RX ADMIN — ROSUVASTATIN CALCIUM 10 MG: 5 TABLET, FILM COATED ORAL at 22:46

## 2018-11-16 RX ADMIN — POTASSIUM CHLORIDE 20 MEQ: 20 TABLET, EXTENDED RELEASE ORAL at 17:31

## 2018-11-16 RX ADMIN — Medication 10 ML: at 13:18

## 2018-11-16 RX ADMIN — CALCIUM CARBONATE 500 MG (1,250 MG)-VITAMIN D3 200 UNIT TABLET 1 TABLET: at 07:18

## 2018-11-16 RX ADMIN — MEMANTINE HYDROCHLORIDE 5 MG: 5 TABLET ORAL at 17:31

## 2018-11-16 RX ADMIN — MEMANTINE HYDROCHLORIDE 5 MG: 5 TABLET ORAL at 08:48

## 2018-11-16 RX ADMIN — HYDRALAZINE HYDROCHLORIDE 50 MG: 50 TABLET, FILM COATED ORAL at 15:48

## 2018-11-16 RX ADMIN — DONEPEZIL HYDROCHLORIDE 10 MG: 5 TABLET, FILM COATED ORAL at 22:47

## 2018-11-16 RX ADMIN — METRONIDAZOLE 500 MG: 500 INJECTION, SOLUTION INTRAVENOUS at 06:10

## 2018-11-16 RX ADMIN — Medication 10 ML: at 06:11

## 2018-11-16 RX ADMIN — METRONIDAZOLE 500 MG: 500 INJECTION, SOLUTION INTRAVENOUS at 17:32

## 2018-11-16 RX ADMIN — VANCOMYCIN HYDROCHLORIDE 1500 MG: 10 INJECTION, POWDER, LYOPHILIZED, FOR SOLUTION INTRAVENOUS at 22:48

## 2018-11-16 RX ADMIN — METRONIDAZOLE 500 MG: 500 INJECTION, SOLUTION INTRAVENOUS at 11:45

## 2018-11-16 RX ADMIN — CEFTRIAXONE SODIUM 2 G: 2 INJECTION, POWDER, FOR SOLUTION INTRAMUSCULAR; INTRAVENOUS at 07:18

## 2018-11-16 RX ADMIN — FOLIC ACID 0.5 MG: 1 TABLET ORAL at 08:48

## 2018-11-16 RX ADMIN — HYDRALAZINE HYDROCHLORIDE 50 MG: 50 TABLET, FILM COATED ORAL at 22:47

## 2018-11-16 RX ADMIN — AMLODIPINE BESYLATE 10 MG: 10 TABLET ORAL at 08:48

## 2018-11-16 RX ADMIN — Medication 10 ML: at 22:45

## 2018-11-16 RX ADMIN — POTASSIUM CHLORIDE 20 MEQ: 20 TABLET, EXTENDED RELEASE ORAL at 08:48

## 2018-11-16 RX ADMIN — HYDRALAZINE HYDROCHLORIDE 50 MG: 50 TABLET, FILM COATED ORAL at 08:54

## 2018-11-16 NOTE — PROGRESS NOTES
Bedside and Verbal shift change report given to Iesha AGUILAR (oncoming nurse) by [address] (offgoing nurse). Report included the following information SBAR, Kardex, ED Summary, OR Summary, Procedure Summary, Intake/Output, MAR, Recent Results and Cardiac Rhythm NSR. Dual neuro assessment completed. No changes from previous assessments EVD zeroed at 10 cm H2O above the tragus

## 2018-11-16 NOTE — PROGRESS NOTES
Hospitalist Progress Note Admit Date:  2018  6:44 AM  
Name:  Mikaela Ambrosio Age:  59 y.o. 
:  1955 MRN:  [id number] PCP:  Montrell Calvillo MD 
Treatment Team: Attending Provider: Karoline Sofia MD; Consulting Provider: Kb Macedo MD; Care Manager: Bossman Banda RN; Utilization Review: Kira Lopez RN Subjective:  
 
Subjective: No new complaints. Mental status unchanged. Objective:  
 
Patient Vitals for the past 24 hrs: 
 Temp Pulse Resp BP SpO2  
18 1703  93 17 128/68 99 % 18 1631  79 13 118/56 100 % 18 1602  79 14 117/60 99 % 18 1531  79 13 127/60 99 % 18 1502 97.6 °F (36.4 °C) 80 18 128/60 98 % 18 1431  82 18 143/65 99 % 18 1402  78 14 128/59 99 % 18 1331  85 21 134/63 97 % 18 1302  96 14 137/76 98 % 18 1202 97.7 °F (36.5 °C) 78 13 141/62 99 % 18 1131  86 20 138/63 98 % 18 1031  87 15 146/65 97 % 18 1002  89 20 145/71 98 % 18 0902  80 17 150/70 98 % 18 0831  70 13 143/67 98 % 18 0802  80 16 136/65 99 % 18 0731  81 18 135/62 98 % 18 0703 97.5 °F (36.4 °C) 78 15 135/64 98 % 18 0631  73 14 160/82 99 % 18 0602  76 21 154/72 98 % 18 0531  83 13 159/88 99 % 18 0502  78 20 150/68 98 % 18 0431  78 15 147/70 99 % 18 0402  84 18 149/73 99 % 18 0331  80 19 134/65 99 % 11/16/18 0302 97.6 °F (36.4 °C) 76 14 140/71 99 % 16/18 0231  80 14 152/70 100 % 16/18 0202  80 15 143/67 100 % 16/18 0131  84 16 144/67 97 % 16/18 0102  84 18 150/69 99 % 18 0033  86 17  99 % 18 0032 97.6 °F (36.4 °C) 94  137/63 99 % /18 0002  81 17 160/70 100 % 11/15/18 2331  78 16 143/64 100 % 11/15/18 2301  84 19 136/63 100 % 11/15/18 2231  81 15 133/61 97 % 11/15/18 2201  80 19 129/61 100 % 11/15/18 2102  83 26 131/62 100 % 11/15/18 2034  76 16  100 % 11/15/18 2033  86 14    
11/15/18 2032  79 17 103/78   
11/15/18 2001  75 16 145/73 100 % 11/15/18 1931 97.6 °F (36.4 °C) 78 18 143/65 98 % 11/15/18 1901  77 19 145/75 100 % 11/15/18 1808  85  139/74  Oxygen Therapy O2 Sat (%): 99 % (11/16/18 1703) Pulse via Oximetry: 95 beats per minute (11/16/18 1703) O2 Device: Room air (11/16/18 0703) O2 Flow Rate (L/min): 2 l/min (11/10/18 1901) Intake/Output Summary (Last 24 hours) at 11/16/2018 1721 Last data filed at 11/16/2018 1700 Gross per 24 hour Intake 2110 ml Output 2521 ml Net -411 ml Physical Examination: 
General:         pleasant , thinks president is Vanessa Dutta, alert to person and place and month Head:               External ventricular shunt left skull, sutures right forehead Eyes:               conjugate eye movement CV:                  Reglar no loud murmurs Lungs:             Unlabored, no cyanosis Abdomen:        Soft, nondistended, nontender. Extremities:     Warm and dry. No cyanosis or edema. Neuro:             moves all extremities. No focal motor weakness no tremors Data Review: 
I have reviewed all labs, meds, telemetry events, and studies from the last 24 hours. No results found for this or any previous visit (from the past 24 hour(s)). All Micro Results Procedure Component Value Units Date/Time CULTURE, CSF Darrall Cyndy [[id number]] Collected:  11/13/18 1516 Order Status:  Completed Specimen:  Cerebrospinal Fluid Updated:  11/16/18 0124 Special Requests: NO SPECIAL REQUESTS     
  GRAM STAIN NO WBCS SEEN     
   NO DEFINITE ORGANISM SEEN Culture result: NO GROWTH 3 DAYS     
 CULTURE, CSF Sisto Sable STAIN [[id number]] Collected:  11/08/18 0912 Order Status:  Completed Specimen:  Cerebrospinal Fluid Updated:  11/13/18 4470   Special Requests: NO SPECIAL REQUESTS     
  GRAM STAIN 0 TO 3 WBCS/OIF  
   NO DEFINITE ORGANISM SEEN     
 Culture result: NO GROWTH 5 DAYS     
 CULTURE, BLOOD [[id number]] Collected:  11/08/18 0836 Order Status:  Completed Specimen:  Blood Updated:  11/13/18 0719 Special Requests: --     
  LEFT 
FOREARM Culture result: NO GROWTH 5 DAYS     
 CULTURE, BLOOD [[id number]] Collected:  11/08/18 5703 Order Status:  Completed Specimen:  Blood Updated:  11/13/18 0719 Special Requests: --     
  RIGHT 
FOREARM Culture result: NO GROWTH 5 DAYS Current Meds: 
Current Facility-Administered Medications Medication Dose Route Frequency  amLODIPine (NORVASC) tablet 10 mg  10 mg Oral DAILY  hydrALAZINE (APRESOLINE) tablet 50 mg  50 mg Oral TID  labetalol (NORMODYNE;TRANDATE) injection 20 mg  20 mg IntraVENous Q6H PRN  
 vancomycin (VANCOCIN) 1500 mg in  ml infusion  1,500 mg IntraVENous Q18H  
 influenza vaccine 2018-19 (6 mos+)(PF) (FLUARIX QUAD/FLULAVAL QUAD) injection 0.5 mL  0.5 mL IntraMUSCular PRIOR TO DISCHARGE  metroNIDAZOLE (FLAGYL) IVPB premix 500 mg  500 mg IntraVENous Q6H  
 potassium chloride (K-DUR, KLOR-CON) SR tablet 20 mEq  20 mEq Oral BID  
 donepezil (ARICEPT) tablet 10 mg  10 mg Oral QHS  rosuvastatin (CRESTOR) tablet 10 mg  10 mg Oral QHS  memantine (NAMENDA) tablet 5 mg  5 mg Oral BID  sodium chloride (NS) flush 5-10 mL  5-10 mL IntraVENous Q8H  
 sodium chloride (NS) flush 5-10 mL  5-10 mL IntraVENous PRN  
 acetaminophen (TYLENOL) tablet 650 mg  650 mg Oral Q4H PRN  
 ondansetron (ZOFRAN) injection 4 mg  4 mg IntraVENous Q4H PRN  
 HYDROcodone-acetaminophen (NORCO) 5-325 mg per tablet 1 Tab  1 Tab Oral Q4H PRN  
 morphine injection 1 mg  1 mg IntraVENous Q4H PRN  
 hydrALAZINE (APRESOLINE) 20 mg/mL injection 10 mg  10 mg IntraVENous Q6H PRN  
 calcium-vitamin D (OS-WILNER) 500 mg-200 unit tablet  1 Tab Oral DAILY WITH BREAKFAST  folic acid (FOLVITE) tablet 0.5 mg  0.5 mg Oral DAILY  cefTRIAXone (ROCEPHIN) 2 g in 0.9% sodium chloride (MBP/ADV) 50 mL  2 g IntraVENous Q12H Diet: DIET MECHANICAL SOFT Other Studies (last 24 hours): No results found. Assessment and Plan:  
 
Hospital Problems as of 11/16/2018 Date Reviewed: 12/24/2013 Codes Class Noted - Resolved POA Altered mental status ICD-10-CM: R41.82 
ICD-9-CM: 780.97  11/8/2018 - Present MORIS (acute kidney injury) ([facility] [address] ICD-10-CM: N17.9 ICD-9-CM: 584.9  11/8/2018 - Present Yes * (Principal) Acute encephalopathy ICD-10-CM: G93.40 ICD-9-CM: 348.30  12/12/2013 - Present Yes Malfunction of ventriculoperitoneal shunt (HCC) ICD-10-CM: S05.98UU ICD-9-CM: 996.2 Acute 6/9/2013 - Present Yes Hypertension ICD-10-CM: I10 
ICD-9-CM: 401.9 Chronic 6/9/2013 - Present Yes A/P:   
Ventriculoperitoneal shunt malfunction/infection S/p removal of left ventriculoperitoneal shunt and right ventricular catheter and placement of a right external ventricular drain on 11/8 
- neurosurgery re: shunt and antibiotics appreciated --csf cultured 11/13-No growth Hospitalist service is following patient clinically stable vitals stable 
  
From neurosurg. Note from 11/14/18 re: follow up / plan: 
Scooter Wells y. o. female who is now 6 Days Post-Op from removal of her left ventriculoperitoneal shunt and right ventricular catheter and placement of a right external ventricular drain for treatment of ventriculoperitoneal shunt malfunction/infection. 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 Will plan for ventriculoperitoneal shunt placement with general surgery assistance on Tuesday 11/20/2018 if cultures remain negative   
- Continue ICU level care - Can do q2H neuro checks - Continue SCDs bilaterally for DVT Prophylaxis - Continue Diet - Contiue EVD at 10 cmH2O call if drain and measure ICP q1H 
- CSF no growth to date with a benign profile.  
- Please call with questions or concerns 
  
MD Ag Gr[facility] 
  
MARCIANO Creatinine on admission 1.8-- improved /resolved . Last bmp 11/14 
  
HTN 
- holding lisinopril, HCTZ --discontinued re: marciano on admit - Norvasc, Hydralazine--control okThis remains unchanged 
 
  
  
Proph - SCDs Full code Dispo - per NS

## 2018-11-16 NOTE — PROGRESS NOTES
Bedside and Verbal shift change report given to Al Anderson (oncoming nurse) by Valeriy Barr (offgoing nurse). Report included the following information SBAR, Kardex, OR Summary, Procedure Summary, Intake/Output, MAR, Recent Results, Med Rec Status and Cardiac Rhythm SR  
 
ICP currently 10 CPP 82 Output 11 mL

## 2018-11-17 PROCEDURE — 74011000258 HC RX REV CODE- 258: Performed by: NEUROLOGICAL SURGERY

## 2018-11-17 PROCEDURE — 74011250636 HC RX REV CODE- 250/636: Performed by: NEUROLOGICAL SURGERY

## 2018-11-17 PROCEDURE — 74011250637 HC RX REV CODE- 250/637: Performed by: FAMILY MEDICINE

## 2018-11-17 PROCEDURE — 65620000000 HC RM CCU GENERAL

## 2018-11-17 PROCEDURE — 74011250636 HC RX REV CODE- 250/636: Performed by: FAMILY MEDICINE

## 2018-11-17 PROCEDURE — 74011250637 HC RX REV CODE- 250/637: Performed by: INTERNAL MEDICINE

## 2018-11-17 RX ADMIN — MEMANTINE HYDROCHLORIDE 5 MG: 5 TABLET ORAL at 08:13

## 2018-11-17 RX ADMIN — MEMANTINE HYDROCHLORIDE 5 MG: 5 TABLET ORAL at 17:39

## 2018-11-17 RX ADMIN — METRONIDAZOLE 500 MG: 500 INJECTION, SOLUTION INTRAVENOUS at 17:39

## 2018-11-17 RX ADMIN — Medication 10 ML: at 22:29

## 2018-11-17 RX ADMIN — HYDRALAZINE HYDROCHLORIDE 50 MG: 50 TABLET, FILM COATED ORAL at 22:29

## 2018-11-17 RX ADMIN — POTASSIUM CHLORIDE 20 MEQ: 20 TABLET, EXTENDED RELEASE ORAL at 08:13

## 2018-11-17 RX ADMIN — DONEPEZIL HYDROCHLORIDE 10 MG: 5 TABLET, FILM COATED ORAL at 22:29

## 2018-11-17 RX ADMIN — VANCOMYCIN HYDROCHLORIDE 1500 MG: 10 INJECTION, POWDER, LYOPHILIZED, FOR SOLUTION INTRAVENOUS at 16:05

## 2018-11-17 RX ADMIN — Medication 10 ML: at 05:56

## 2018-11-17 RX ADMIN — CEFTRIAXONE SODIUM 2 G: 2 INJECTION, POWDER, FOR SOLUTION INTRAMUSCULAR; INTRAVENOUS at 20:08

## 2018-11-17 RX ADMIN — FOLIC ACID 0.5 MG: 1 TABLET ORAL at 08:19

## 2018-11-17 RX ADMIN — HYDRALAZINE HYDROCHLORIDE 50 MG: 50 TABLET, FILM COATED ORAL at 08:13

## 2018-11-17 RX ADMIN — CALCIUM CARBONATE 500 MG (1,250 MG)-VITAMIN D3 200 UNIT TABLET 1 TABLET: at 08:13

## 2018-11-17 RX ADMIN — METRONIDAZOLE 500 MG: 500 INJECTION, SOLUTION INTRAVENOUS at 00:41

## 2018-11-17 RX ADMIN — CEFTRIAXONE SODIUM 2 G: 2 INJECTION, POWDER, FOR SOLUTION INTRAMUSCULAR; INTRAVENOUS at 07:29

## 2018-11-17 RX ADMIN — METRONIDAZOLE 500 MG: 500 INJECTION, SOLUTION INTRAVENOUS at 05:55

## 2018-11-17 RX ADMIN — METRONIDAZOLE 500 MG: 500 INJECTION, SOLUTION INTRAVENOUS at 11:03

## 2018-11-17 RX ADMIN — AMLODIPINE BESYLATE 10 MG: 10 TABLET ORAL at 08:13

## 2018-11-17 RX ADMIN — Medication 10 ML: at 14:00

## 2018-11-17 RX ADMIN — ROSUVASTATIN CALCIUM 10 MG: 5 TABLET, FILM COATED ORAL at 22:29

## 2018-11-17 RX ADMIN — POTASSIUM CHLORIDE 20 MEQ: 20 TABLET, EXTENDED RELEASE ORAL at 17:39

## 2018-11-17 RX ADMIN — HYDRALAZINE HYDROCHLORIDE 50 MG: 50 TABLET, FILM COATED ORAL at 15:05

## 2018-11-17 NOTE — PROGRESS NOTES
Rounding  visit to assess spiritual needs. Pt was sleeping; left a card.  
 
 
jabier Lawrence MDiv,ThM,PhD

## 2018-11-17 NOTE — PROGRESS NOTES
Hospitalist Progress Note Admit Date:  2018  6:44 AM  
Name:  Mya Schaeffer Age:  59 y.o. 
:  1955 MRN:  [id number] PCP:  Karli, MD Montrell 
Treatment Team: Attending Provider: Arnold Herrera MD; Consulting Provider: Maylin Velarde MD; Care Manager: Diana Deshpande RN; Utilization Review: Sachi Vasquez RN Subjective:  
CC: 
 
From admit h and p HPI: 
 
Patient is a pleasant 61yoF with PMH significant for prior brain tumor, with  shunts in place, HTN, HLD who presents today to the ER with her  and sister for 3 days of AMS and gait disturbance. History obtained from . She was at her baseline mentation until a few days ago when she became for confused with no short term memory at all. She developed a very wobbly gait. No falls or N/V at home. As symptoms did not improve and  has seen her have these symptoms in the past associated with her shunt malfunctioning, he brought her to the ER for further evaluation. CT head showed evidence of increase size of lateral ventricles. Neurosurgery was consulted by ER and recommended Hospitalist Service to admit. Per ER nurse, Neurosurgery has just left bedside where peritoneal shunt tap was performed with 50cc of clear CSF was removed. Family and nurse report that patient's mentation is already improving by time of my evaluation in the ER. Patient currently denies any complaints. She denies HA, nausea, vomiting, abdominal pain. Today: 
Baseline mental status. Confused at times --short term recall poor to absent. No new distress or obvious complaints. negative for chest pain dyspnea diarrhea or change in bowel habits. Has vazquez catheter Objective:  
 
Patient Vitals for the past 24 hrs: 
 Temp Pulse Resp BP SpO2  
18 1201  92 17 130/59 99 % 18 1132  66 9 128/60 97 % 18 1101 97.8 °F (36.6 °C) 80 16 136/66 98 % 18 1032  78 14 136/67 97 % 18 1001  81 13 151/72 98 % 11/17/18 0932  92 20 139/70 98 % 11/17/18 0901  76 13 137/67 98 % 11/17/18 0832  91 21 127/63 98 % 11/17/18 0801  87 18 132/75 99 % 11/17/18 0732  70 12 123/71 99 % 11/17/18 0701 97.6 °F (36.4 °C) 83 18 138/65 99 % 11/17/18 0632  74 14 130/65 97 % 11/17/18 0601  77 16 139/70 98 % 11/17/18 0532  74 15 132/62 98 % 11/17/18 0501  83 16 123/73 98 % 11/17/18 0432  74 13 122/58 98 % 11/17/18 0401  72 14 119/56 98 % 11/17/18 0332  74 12 135/62 98 % 11/17/18 0306 97.8 °F (36.6 °C) 84 20 145/73 98 % 11/17/18 0301  83 15 145/73 100 % 11/17/18 0231  76 12 145/72 98 % 11/17/18 0201  80 15 137/65 99 % 11/17/18 0131  76 19 127/63 98 % 11/17/18 0101  76 15 127/64 98 % 11/17/18 0031  79 19 122/58 98 % 11/16/18 2331 97.9 °F (36.6 °C) 79 17 135/60 100 % 11/16/18 2301  80 18 135/63 99 % 11/16/18 2231  80 16 129/58 98 % 11/16/18 2201  75 14 116/56 100 % 11/16/18 2131  78 14 111/56 100 % 11/16/18 2101  80 14 139/63 100 % 11/16/18 2031  80 16 135/60 100 % 11/16/18 2001  81 14 137/65 100 % 11/16/18 1949 98.1 °F (36.7 °C) 82 16 133/60 99 % 11/16/18 1931  80 12 133/60 99 % 11/16/18 1902  89 16 142/58 98 % 11/16/18 1831  91 23 135/61 98 % 11/16/18 1802  86 17 130/60 99 % 11/16/18 1731  89 20 128/60 99 % 11/16/18 1703  93 17 128/68 99 % 11/16/18 1631  79 13 118/56 100 % 11/16/18 1602  79 14 117/60 99 % 11/16/18 1531  79 13 127/60 99 % 11/16/18 1502 97.6 °F (36.4 °C) 80 18 128/60 98 % 11/16/18 1431  82 18 143/65 99 % 11/16/18 1402  78 14 128/59 99 % 11/16/18 1331  85 21 134/63 97 % 11/16/18 1302  96 14 137/76 98 % Oxygen Therapy O2 Sat (%): 99 % (11/17/18 1201) Pulse via Oximetry: 92 beats per minute (11/17/18 1201) O2 Device: Room air (11/17/18 0700) O2 Flow Rate (L/min): 2 l/min (11/10/18 1901) Intake/Output Summary (Last 24 hours) at 11/17/2018 1254 Last data filed at 11/17/2018 1200 Gross per 24 hour Intake 100 ml Output 2784 ml Net -2684 ml Physical Examination: 
General:    Obese confused intermittent --hx of dementia Head:  Left skull drain csf Eyes:  Extraocular movements intact, normal sclera CV:   RRR. No  Murmurs, clicks, or gallops Lungs:   clar to a/p/p Abdomen:   Soft, nondistended, nontender. obese Extremities: Warm and dry. Moves all symmetrically. Skin:     No rashes or jaundice. Psych:  Mood and affect appropriate Data Review: 
I have reviewed all labs, meds, telemetry events, and studies from the last 24 hours. No results found for this or any previous visit (from the past 24 hour(s)). All Micro Results Procedure Component Value Units Date/Time CULTURE, CSF Ya Gordillo [[id number]] Collected:  11/13/18 1516 Order Status:  Completed Specimen:  Cerebrospinal Fluid Updated:  11/17/18 2518 Special Requests: NO SPECIAL REQUESTS     
  GRAM STAIN NO WBCS SEEN     
   NO DEFINITE ORGANISM SEEN Culture result: NO GROWTH 4 DAYS     
 CULTURE, CSF Virgle Argenis STAIN [[id number]] Collected:  11/08/18 0912 Order Status:  Completed Specimen:  Cerebrospinal Fluid Updated:  11/13/18 0725 Special Requests: NO SPECIAL REQUESTS     
  GRAM STAIN 0 TO 3 WBCS/OIF  
   NO DEFINITE ORGANISM SEEN Culture result: NO GROWTH 5 DAYS     
 CULTURE, BLOOD [[id number]] Collected:  11/08/18 0836 Order Status:  Completed Specimen:  Blood Updated:  11/13/18 0719 Special Requests: --     
  LEFT 
FOREARM Culture result: NO GROWTH 5 DAYS     
 CULTURE, BLOOD [[id number]] Collected:  11/08/18 7174 Order Status:  Completed Specimen:  Blood Updated:  11/13/18 0719 Special Requests: --     
  RIGHT 
FOREARM Culture result: NO GROWTH 5 DAYS Current Meds: 
Current Facility-Administered Medications Medication Dose Route Frequency  amLODIPine (NORVASC) tablet 10 mg  10 mg Oral DAILY  hydrALAZINE (APRESOLINE) tablet 50 mg  50 mg Oral TID  labetalol (NORMODYNE;TRANDATE) injection 20 mg  20 mg IntraVENous Q6H PRN  
 vancomycin (VANCOCIN) 1500 mg in  ml infusion  1,500 mg IntraVENous Q18H  
 influenza vaccine 2018-19 (6 mos+)(PF) (FLUARIX QUAD/FLULAVAL QUAD) injection 0.5 mL  0.5 mL IntraMUSCular PRIOR TO DISCHARGE  metroNIDAZOLE (FLAGYL) IVPB premix 500 mg  500 mg IntraVENous Q6H  
 potassium chloride (K-DUR, KLOR-CON) SR tablet 20 mEq  20 mEq Oral BID  
 donepezil (ARICEPT) tablet 10 mg  10 mg Oral QHS  rosuvastatin (CRESTOR) tablet 10 mg  10 mg Oral QHS  memantine (NAMENDA) tablet 5 mg  5 mg Oral BID  sodium chloride (NS) flush 5-10 mL  5-10 mL IntraVENous Q8H  
 sodium chloride (NS) flush 5-10 mL  5-10 mL IntraVENous PRN  
 acetaminophen (TYLENOL) tablet 650 mg  650 mg Oral Q4H PRN  
 ondansetron (ZOFRAN) injection 4 mg  4 mg IntraVENous Q4H PRN  
 HYDROcodone-acetaminophen (NORCO) 5-325 mg per tablet 1 Tab  1 Tab Oral Q4H PRN  
 morphine injection 1 mg  1 mg IntraVENous Q4H PRN  
 hydrALAZINE (APRESOLINE) 20 mg/mL injection 10 mg  10 mg IntraVENous Q6H PRN  
 calcium-vitamin D (OS-WILNER) 500 mg-200 unit tablet  1 Tab Oral DAILY WITH BREAKFAST  folic acid (FOLVITE) tablet 0.5 mg  0.5 mg Oral DAILY  cefTRIAXone (ROCEPHIN) 2 g in 0.9% sodium chloride (MBP/ADV) 50 mL  2 g IntraVENous Q12H Diet: DIET MECHANICAL SOFT Other Studies (last 24 hours): No results found. Assessment and Plan:  
 
Hospital Problems as of 11/17/2018 Date Reviewed: 12/24/2013 Codes Class Noted - Resolved POA Altered mental status ICD-10-CM: R41.82 
ICD-9-CM: 780.97  11/8/2018 - Present MORIS (acute kidney injury) ([facility] [address] ICD-10-CM: N17.9 ICD-9-CM: 584.9  11/8/2018 - Present Yes * (Principal) Acute encephalopathy ICD-10-CM: G93.40 ICD-9-CM: 348.30  12/12/2013 - Present Yes Malfunction of ventriculoperitoneal shunt (HCC) ICD-10-CM: Y29.74JD ICD-9-CM: 996.2 Acute 6/9/2013 - Present Yes Hypertension ICD-10-CM: I10 
ICD-9-CM: 401.9 Chronic 6/9/2013 - Present Yes A/P:   
Ventriculoperitoneal shunt malfunction/infection S/p removal of left ventriculoperitoneal shunt and right ventricular catheter and placement of a right external ventricular drain on 11/8 
- neurosurgery -new shunt possibly planned 11/20/18 
--consider discontinue vazquez if ok with primary service --risk of cauti MORIS Resolved with ivf and med change as below 
  
HTN Continue to avoid diuretic and ace inh. 
- Norvasc, Hydralazine--control okThis remains unchanged 
 
  
From neurosurg. Dr Katlyn Jennings Note from 11/14/18 re: follow up / plan: 
Nancy Brito y. o. female who is now 6 Days Post-Op from removal of her left ventriculoperitoneal shunt and right ventricular catheter and placement of a right external ventricular drain for treatment of ventriculoperitoneal shunt malfunction/infection. 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 Will plan for ventriculoperitoneal shunt placement with general surgery assistance on Tuesday 11/20/2018 if cultures remain negative   
- Continue ICU level care - Can do q2H neuro checks - Continue SCDs bilaterally for DVT Prophylaxis - Continue Diet  
- Contiue EVD at 10 cmH2O call if drain and measure ICP q1H 
- CSF no growth to date with a benign profile.  
- Please call with questions or concerns   
 
  
 
  
  
  
Proph - SCDs Full code Dispo - per NS

## 2018-11-17 NOTE — PROGRESS NOTES
Bedside and Verbal shift change report given to [address] [place] [address] (oncoming nurse) by Minna Favre (offgoing nurse). Report included the following information SBAR, Kardex, ED Summary, OR Summary, Procedure Summary, Intake/Output, MAR and Recent Results.

## 2018-11-17 NOTE — PROGRESS NOTES
Bedside and Verbal shift change report given to Jose See (oncoming nurse) by Feliberto Monterroso (offgoing nurse). Report included the following information SBAR, Kardex, ED Summary, OR Summary, Procedure Summary, Intake/Output, MAR and Recent Results.

## 2018-11-18 ENCOUNTER — APPOINTMENT (OUTPATIENT)
Dept: CT IMAGING | Age: 63
DRG: 026 | End: 2018-11-18
Attending: INTERNAL MEDICINE
Payer: MEDICARE

## 2018-11-18 PROBLEM — H10.9 CONJUNCTIVITIS: Status: ACTIVE | Noted: 2018-11-18

## 2018-11-18 LAB
BACTERIA SPEC CULT: NORMAL
GRAM STN SPEC: NORMAL
GRAM STN SPEC: NORMAL
SERVICE CMNT-IMP: NORMAL

## 2018-11-18 PROCEDURE — 70450 CT HEAD/BRAIN W/O DYE: CPT

## 2018-11-18 PROCEDURE — 74011250637 HC RX REV CODE- 250/637: Performed by: INTERNAL MEDICINE

## 2018-11-18 PROCEDURE — 74011250636 HC RX REV CODE- 250/636: Performed by: FAMILY MEDICINE

## 2018-11-18 PROCEDURE — 77030014008 HC SPNG HEMSTAT J&J -C

## 2018-11-18 PROCEDURE — 74011000250 HC RX REV CODE- 250: Performed by: INTERNAL MEDICINE

## 2018-11-18 PROCEDURE — 74011000258 HC RX REV CODE- 258: Performed by: NEUROLOGICAL SURGERY

## 2018-11-18 PROCEDURE — 74011250637 HC RX REV CODE- 250/637: Performed by: FAMILY MEDICINE

## 2018-11-18 PROCEDURE — 65620000000 HC RM CCU GENERAL

## 2018-11-18 PROCEDURE — 74011250636 HC RX REV CODE- 250/636: Performed by: NEUROLOGICAL SURGERY

## 2018-11-18 RX ORDER — LORATADINE 10 MG/1
10 TABLET ORAL DAILY
Status: DISCONTINUED | OUTPATIENT
Start: 2018-11-18 | End: 2018-11-26 | Stop reason: HOSPADM

## 2018-11-18 RX ORDER — CIPROFLOXACIN HYDROCHLORIDE 3.5 MG/ML
1 SOLUTION/ DROPS TOPICAL
Status: DISCONTINUED | OUTPATIENT
Start: 2018-11-20 | End: 2018-11-20

## 2018-11-18 RX ORDER — CIPROFLOXACIN HYDROCHLORIDE 3.5 MG/ML
1 SOLUTION/ DROPS TOPICAL
Status: DISCONTINUED | OUTPATIENT
Start: 2018-11-18 | End: 2018-11-18

## 2018-11-18 RX ORDER — CIPROFLOXACIN HYDROCHLORIDE 3.5 MG/ML
1 SOLUTION/ DROPS TOPICAL
Status: DISCONTINUED | OUTPATIENT
Start: 2018-11-18 | End: 2018-11-20

## 2018-11-18 RX ADMIN — AMLODIPINE BESYLATE 10 MG: 10 TABLET ORAL at 08:05

## 2018-11-18 RX ADMIN — METRONIDAZOLE 500 MG: 500 INJECTION, SOLUTION INTRAVENOUS at 17:05

## 2018-11-18 RX ADMIN — HYDRALAZINE HYDROCHLORIDE 50 MG: 50 TABLET, FILM COATED ORAL at 08:05

## 2018-11-18 RX ADMIN — CIPROFLOXACIN HYDROCHLORIDE 1 DROP: 3 SOLUTION/ DROPS OPHTHALMIC at 14:22

## 2018-11-18 RX ADMIN — ROSUVASTATIN CALCIUM 10 MG: 5 TABLET, FILM COATED ORAL at 22:54

## 2018-11-18 RX ADMIN — Medication 10 ML: at 06:14

## 2018-11-18 RX ADMIN — ACETAMINOPHEN 650 MG: 325 TABLET, FILM COATED ORAL at 14:06

## 2018-11-18 RX ADMIN — METRONIDAZOLE 500 MG: 500 INJECTION, SOLUTION INTRAVENOUS at 11:09

## 2018-11-18 RX ADMIN — HYDRALAZINE HYDROCHLORIDE 50 MG: 50 TABLET, FILM COATED ORAL at 17:05

## 2018-11-18 RX ADMIN — VANCOMYCIN HYDROCHLORIDE 1500 MG: 10 INJECTION, POWDER, LYOPHILIZED, FOR SOLUTION INTRAVENOUS at 10:11

## 2018-11-18 RX ADMIN — Medication 10 ML: at 22:55

## 2018-11-18 RX ADMIN — POTASSIUM CHLORIDE 20 MEQ: 20 TABLET, EXTENDED RELEASE ORAL at 17:05

## 2018-11-18 RX ADMIN — MEMANTINE HYDROCHLORIDE 5 MG: 5 TABLET ORAL at 17:04

## 2018-11-18 RX ADMIN — METRONIDAZOLE 500 MG: 500 INJECTION, SOLUTION INTRAVENOUS at 06:13

## 2018-11-18 RX ADMIN — MEMANTINE HYDROCHLORIDE 5 MG: 5 TABLET ORAL at 08:05

## 2018-11-18 RX ADMIN — Medication 10 ML: at 14:00

## 2018-11-18 RX ADMIN — HYDRALAZINE HYDROCHLORIDE 50 MG: 50 TABLET, FILM COATED ORAL at 22:54

## 2018-11-18 RX ADMIN — CIPROFLOXACIN HYDROCHLORIDE 1 DROP: 3 SOLUTION/ DROPS OPHTHALMIC at 17:08

## 2018-11-18 RX ADMIN — METRONIDAZOLE 500 MG: 500 INJECTION, SOLUTION INTRAVENOUS at 00:13

## 2018-11-18 RX ADMIN — CIPROFLOXACIN HYDROCHLORIDE 1 DROP: 3 SOLUTION/ DROPS OPHTHALMIC at 18:10

## 2018-11-18 RX ADMIN — CEFTRIAXONE SODIUM 2 G: 2 INJECTION, POWDER, FOR SOLUTION INTRAMUSCULAR; INTRAVENOUS at 20:01

## 2018-11-18 RX ADMIN — POTASSIUM CHLORIDE 20 MEQ: 20 TABLET, EXTENDED RELEASE ORAL at 08:05

## 2018-11-18 RX ADMIN — DONEPEZIL HYDROCHLORIDE 10 MG: 5 TABLET, FILM COATED ORAL at 22:55

## 2018-11-18 RX ADMIN — FOLIC ACID 0.5 MG: 1 TABLET ORAL at 08:05

## 2018-11-18 RX ADMIN — CIPROFLOXACIN HYDROCHLORIDE 1 DROP: 3 SOLUTION/ DROPS OPHTHALMIC at 20:08

## 2018-11-18 RX ADMIN — CEFTRIAXONE SODIUM 2 G: 2 INJECTION, POWDER, FOR SOLUTION INTRAMUSCULAR; INTRAVENOUS at 07:24

## 2018-11-18 RX ADMIN — LORATADINE 10 MG: 10 TABLET ORAL at 14:22

## 2018-11-18 RX ADMIN — CIPROFLOXACIN HYDROCHLORIDE 1 DROP: 3 SOLUTION/ DROPS OPHTHALMIC at 22:55

## 2018-11-18 RX ADMIN — CALCIUM CARBONATE 500 MG (1,250 MG)-VITAMIN D3 200 UNIT TABLET 1 TABLET: at 07:26

## 2018-11-18 NOTE — PROGRESS NOTES
Hospitalist Progress Note Admit Date:  2018  6:44 AM  
Name:  Jerry Huizar Age:  59 y.o. 
:  1955 MRN:  [id number] PCP:  Karli, MD [place] 
Treatment Team: Attending Provider: Zenon Perales MD; Consulting Provider: Megan Anderson MD; Care Manager: Gaby Guan RN; Utilization Review: Leeroy Garcia RN Subjective:  
 
From admit h and p HPI: 
  
Patient is a pleasant 63yoF with PMH significant for prior brain tumor, with  shunts in place, HTN, HLD who presents today to the ER with her  and sister for 3 days of AMS and gait disturbance.  History obtained from . Shola Walton was at her baseline mentation until a few days ago when she became for confused with no short term memory at all.  She developed a very wobbly gait.  No falls or N/V at home.  As symptoms did not improve and  has seen her have these symptoms in the past associated with her shunt malfunctioning, he brought her to the ER for further evaluation.  CT head showed evidence of increase size of lateral ventricles.  Neurosurgery was consulted by ER and recommended Hospitalist Service to admit.  Per ER nurse, Neurosurgery has just left bedside where peritoneal shunt tap was performed with 50cc of clear CSF was removed.  Family and nurse report that patient's mentation is already improving by time of my evaluation in the ER.  Patient currently denies any complaints.  She denies HA, nausea, vomiting, abdominal pain. 
  
Today: 
Baseline mental status. Confused at times --short term recall poor to absent--this unchanged. She complained to nurses about left eye pain. Conjunctiva red no cobblestoning. She has been rubbing. No hx of glacuoma per pt or family or med list.  
 
 
 
 
 
Objective:  
 
Patient Vitals for the past 24 hrs: 
 Temp Pulse Resp BP SpO2  
18 0731  79 14 143/74 99 % 18 0702  69 12 126/69 98 % 18 0631  75 15 143/69 99 % 18 0602  80 20 154/73 99 % 11/18/18 0531  75 14 148/77 99 % 11/18/18 0502  80 21 138/68 99 % 11/18/18 0431  74 16 147/69 98 % 11/18/18 0402  80 15 151/69 100 % 11/18/18 0331  75 13 140/72 98 % 11/18/18 0308 97.6 °F (36.4 °C) 76 24 149/71 98 % 11/18/18 0302  79 21 149/71 98 % 11/18/18 0231  76 23 145/67 98 % 11/18/18 0202  80 23 144/69 98 % 11/18/18 0131  89 21 145/70 99 % 11/18/18 0102  80 15 135/68 99 % 11/18/18 0031  81 18 135/66 98 % 11/18/18 0001  78 15 146/60 98 % 11/17/18 2331  78 12 140/65 98 % 11/17/18 2302 97.4 °F (36.3 °C) 77 14 139/65 98 % 11/17/18 2301  79 17 139/65 99 % 11/17/18 2231  83 17 128/60 98 % 11/17/18 2201  79 11 141/69 100 % 11/17/18 2131  78 15 137/65 98 % 11/17/18 2101  83 16 128/67 99 % 11/17/18 2031  81 16 145/66 98 % 11/17/18 2001  81 15 140/65 99 % 11/17/18 1931  78 15 140/66 98 % 11/17/18 1912 97.7 °F (36.5 °C) 84 18 144/69 98 % 11/17/18 1901  87 16 144/69 99 % 11/17/18 1831  83 16 142/67 99 % 11/17/18 1801  82 21 140/64 99 % 11/17/18 1731  87 17 134/60 98 % 11/17/18 1701  82 15 125/57 98 % 11/17/18 1631  83 16 131/60 98 % 11/17/18 1601  77 14 139/65 99 % 11/17/18 1532  84 17 126/69 98 % 11/17/18 1501 97.9 °F (36.6 °C) 83 16 138/65 98 % 11/17/18 1432  85 17 135/65 99 % 11/17/18 1401  80 17 143/71 98 % Oxygen Therapy O2 Sat (%): 99 % (11/18/18 0731) Pulse via Oximetry: 79 beats per minute (11/18/18 0731) O2 Device: Room air (11/18/18 0700) O2 Flow Rate (L/min): 2 l/min (11/10/18 1901) Intake/Output Summary (Last 24 hours) at 11/18/2018 1351 Last data filed at 11/18/2018 1100 Gross per 24 hour Intake 1750 ml Output 2489 ml Net -739 ml Physical Examination: 
General:    Mental status alert to person and place. Head:  Cerebral ventricular drain Eyes:  Left conjunctiva red clear drainage, eomi CV:   RRR. No  Murmurs, clicks, or gallops Lungs:   Clear a/p/p, no wheeze Abdomen:   Soft, nondistended, nontender. Extremities: No focal motor weakness Skin:     No rashes or jaundice. Neuro:  No tremor cranial nerves appear intact Data Review: 
I have reviewed all labs, meds, telemetry events, and studies from the last 24 hours. No results found for this or any previous visit (from the past 24 hour(s)). All Micro Results Procedure Component Value Units Date/Time CULTURE, CSF Prakash Torres [[id number]] Collected:  11/13/18 1516 Order Status:  Completed Specimen:  Cerebrospinal Fluid Updated:  11/18/18 5495 Special Requests: NO SPECIAL REQUESTS     
  GRAM STAIN NO WBCS SEEN     
   NO DEFINITE ORGANISM SEEN Culture result: NO GROWTH 5 DAYS     
 CULTURE, CSF Kavya Wick STAIN [[id number]] Collected:  11/08/18 0912 Order Status:  Completed Specimen:  Cerebrospinal Fluid Updated:  11/13/18 0725 Special Requests: NO SPECIAL REQUESTS     
  GRAM STAIN 0 TO 3 WBCS/OIF  
   NO DEFINITE ORGANISM SEEN Culture result: NO GROWTH 5 DAYS     
 CULTURE, BLOOD [[id number]] Collected:  11/08/18 0836 Order Status:  Completed Specimen:  Blood Updated:  11/13/18 0719 Special Requests: --     
  LEFT 
FOREARM Culture result: NO GROWTH 5 DAYS     
 CULTURE, BLOOD [[id number]] Collected:  11/08/18 5171 Order Status:  Completed Specimen:  Blood Updated:  11/13/18 0719 Special Requests: --     
  RIGHT 
FOREARM Culture result: NO GROWTH 5 DAYS Current Meds: 
Current Facility-Administered Medications Medication Dose Route Frequency  [START ON 11/19/2018] Vancomycin trough reminder   Other ONCE  
 loratadine (CLARITIN) tablet 10 mg  10 mg Oral DAILY  ciprofloxacin HCl (CILOXAN) 0.3 % ophthalmic solution 1 Drop  1 Drop Both Eyes Q2H  
 amLODIPine (NORVASC) tablet 10 mg  10 mg Oral DAILY  hydrALAZINE (APRESOLINE) tablet 50 mg  50 mg Oral TID  labetalol (NORMODYNE;TRANDATE) injection 20 mg  20 mg IntraVENous Q6H PRN  
  vancomycin (VANCOCIN) 1500 mg in  ml infusion  1,500 mg IntraVENous Q18H  
 influenza vaccine 2018-19 (6 mos+)(PF) (FLUARIX QUAD/FLULAVAL QUAD) injection 0.5 mL  0.5 mL IntraMUSCular PRIOR TO DISCHARGE  metroNIDAZOLE (FLAGYL) IVPB premix 500 mg  500 mg IntraVENous Q6H  
 potassium chloride (K-DUR, KLOR-CON) SR tablet 20 mEq  20 mEq Oral BID  
 donepezil (ARICEPT) tablet 10 mg  10 mg Oral QHS  rosuvastatin (CRESTOR) tablet 10 mg  10 mg Oral QHS  memantine (NAMENDA) tablet 5 mg  5 mg Oral BID  sodium chloride (NS) flush 5-10 mL  5-10 mL IntraVENous Q8H  
 sodium chloride (NS) flush 5-10 mL  5-10 mL IntraVENous PRN  
 acetaminophen (TYLENOL) tablet 650 mg  650 mg Oral Q4H PRN  
 ondansetron (ZOFRAN) injection 4 mg  4 mg IntraVENous Q4H PRN  
 HYDROcodone-acetaminophen (NORCO) 5-325 mg per tablet 1 Tab  1 Tab Oral Q4H PRN  
 morphine injection 1 mg  1 mg IntraVENous Q4H PRN  
 hydrALAZINE (APRESOLINE) 20 mg/mL injection 10 mg  10 mg IntraVENous Q6H PRN  
 calcium-vitamin D (OS-WILNER) 500 mg-200 unit tablet  1 Tab Oral DAILY WITH BREAKFAST  folic acid (FOLVITE) tablet 0.5 mg  0.5 mg Oral DAILY  cefTRIAXone (ROCEPHIN) 2 g in 0.9% sodium chloride (MBP/ADV) 50 mL  2 g IntraVENous Q12H Diet: DIET MECHANICAL SOFT Other Studies (last 24 hours): No results found. Assessment and Plan:  
 
Hospital Problems as of 11/18/2018 Date Reviewed: 12/24/2013 Codes Class Noted - Resolved POA Conjunctivitis ICD-10-CM: H10.9 ICD-9-CM: 372.30  11/18/2018 - Present Unknown Altered mental status ICD-10-CM: R41.82 
ICD-9-CM: 780.97  11/8/2018 - Present [facility] ICD-10-CM: N17.9 ICD-9-CM: 584.9  11/8/2018 - Present Yes * (Principal) Acute encephalopathy ICD-10-CM: G93.40 ICD-9-CM: 348.30  12/12/2013 - Present Yes Malfunction of ventriculoperitoneal shunt (HCC) ICD-10-CM: K36.83GW ICD-9-CM: 996.2 Acute 6/9/2013 - Present Yes Hypertension ICD-10-CM: I10 
ICD-9-CM: 401.9 Chronic 6/9/2013 - Present Yes A/P:   
Ventriculoperitoneal shunt malfunction/infection S/p removal of left ventriculoperitoneal shunt and right ventricular catheter and placement of a right external ventricular drain on 11/8 
- neurosurgery -new shunt possibly planned 11/20/18 
--consider discontinue vazquez if ok with primary service --risk of cauti 
  
Allergic vs early viral conjunctivitis- 
Will notify neurosurgery 
claritin daily, topical quinolone eye drops-monitor MORIS Resolved with ivf and med change as below 
  
HTN Continue to avoid diuretic and ace inh. 
- Norvasc, Hydralazine--control okThis remains unchanged 
  
  
From neurosurg. Dr Nadeen Chauhan Note from 11/14/18 re: follow up / plan: 
Flavio West y. o. female who is now 6 Days Post-Op from removal of her left ventriculoperitoneal shunt and right ventricular catheter and placement of a right external ventricular drain for treatment of ventriculoperitoneal shunt malfunction/infection. 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 Will plan for ventriculoperitoneal shunt placement with general surgery assistance on Tuesday 11/20/2018 if cultures remain negative   
- Continue ICU level care - Can do q2H neuro checks - Continue SCDs bilaterally for DVT Prophylaxis - Continue Diet  
- Contiue EVD at 10 cmH2O call if drain and measure ICP q1H 
- CSF no growth to date with a benign profile.  
- Please call with questions or concerns   
  
  
  
  
  
  
Proph - SCDs Full code Dispo - per NS

## 2018-11-18 NOTE — PROGRESS NOTES
Bedside and Verbal shift change report given to Jose See (oncoming nurse) by Poncho Concepcion (offgoing nurse). Report included the following information SBAR, Kardex, ED Summary, OR Summary, Procedure Summary, Intake/Output and Recent Results.

## 2018-11-18 NOTE — PROGRESS NOTES
Bedside and Verbal shift change report given to [address] [place] [address] (oncoming nurse) by Mae Louise (offgoing nurse). Report included the following information SBAR, Kardex, ED Summary, OR Summary, Procedure Summary, Intake/Output, MAR and Recent Results.

## 2018-11-19 ENCOUNTER — ANESTHESIA EVENT (OUTPATIENT)
Dept: SURGERY | Age: 63
DRG: 026 | End: 2018-11-19
Payer: MEDICARE

## 2018-11-19 LAB
ANION GAP SERPL CALC-SCNC: 6 MMOL/L (ref 7–16)
BASOPHILS # BLD: 0 K/UL (ref 0–0.2)
BASOPHILS NFR BLD: 0 % (ref 0–2)
BUN SERPL-MCNC: 18 MG/DL (ref 8–23)
CALCIUM SERPL-MCNC: 8.9 MG/DL (ref 8.3–10.4)
CHLORIDE SERPL-SCNC: 106 MMOL/L (ref 98–107)
CO2 SERPL-SCNC: 28 MMOL/L (ref 21–32)
CREAT SERPL-MCNC: 1.16 MG/DL (ref 0.6–1)
DIFFERENTIAL METHOD BLD: ABNORMAL
EOSINOPHIL # BLD: 0.2 K/UL (ref 0–0.8)
EOSINOPHIL NFR BLD: 2 % (ref 0.5–7.8)
ERYTHROCYTE [DISTWIDTH] IN BLOOD BY AUTOMATED COUNT: 16.9 %
GLUCOSE SERPL-MCNC: 87 MG/DL (ref 65–100)
HCT VFR BLD AUTO: 28.6 % (ref 35.8–46.3)
HGB BLD-MCNC: 8.8 G/DL (ref 11.7–15.4)
IMM GRANULOCYTES # BLD: 0.1 K/UL (ref 0–0.5)
IMM GRANULOCYTES NFR BLD AUTO: 1 % (ref 0–5)
LYMPHOCYTES # BLD: 1.1 K/UL (ref 0.5–4.6)
LYMPHOCYTES NFR BLD: 12 % (ref 13–44)
MCH RBC QN AUTO: 26 PG (ref 26.1–32.9)
MCHC RBC AUTO-ENTMCNC: 30.8 G/DL (ref 31.4–35)
MCV RBC AUTO: 84.4 FL (ref 79.6–97.8)
MONOCYTES # BLD: 0.6 K/UL (ref 0.1–1.3)
MONOCYTES NFR BLD: 7 % (ref 4–12)
NEUTS SEG # BLD: 7.2 K/UL (ref 1.7–8.2)
NEUTS SEG NFR BLD: 78 % (ref 43–78)
NRBC # BLD: 0 K/UL (ref 0–0.2)
PLATELET # BLD AUTO: 409 K/UL (ref 150–450)
PMV BLD AUTO: 11 FL (ref 9.4–12.3)
POTASSIUM SERPL-SCNC: 4 MMOL/L (ref 3.5–5.1)
RBC # BLD AUTO: 3.39 M/UL (ref 4.05–5.2)
SODIUM SERPL-SCNC: 140 MMOL/L (ref 136–145)
VANCOMYCIN TROUGH SERPL-MCNC: 19.2 UG/ML (ref 5–20)
WBC # BLD AUTO: 9.3 K/UL (ref 4.3–11.1)

## 2018-11-19 PROCEDURE — 74011250636 HC RX REV CODE- 250/636: Performed by: NEUROLOGICAL SURGERY

## 2018-11-19 PROCEDURE — 65620000000 HC RM CCU GENERAL

## 2018-11-19 PROCEDURE — 74011250637 HC RX REV CODE- 250/637: Performed by: FAMILY MEDICINE

## 2018-11-19 PROCEDURE — 74011250636 HC RX REV CODE- 250/636: Performed by: FAMILY MEDICINE

## 2018-11-19 PROCEDURE — 36415 COLL VENOUS BLD VENIPUNCTURE: CPT

## 2018-11-19 PROCEDURE — 77030019605

## 2018-11-19 PROCEDURE — 74011000258 HC RX REV CODE- 258: Performed by: NEUROLOGICAL SURGERY

## 2018-11-19 PROCEDURE — 74011250637 HC RX REV CODE- 250/637: Performed by: INTERNAL MEDICINE

## 2018-11-19 PROCEDURE — 85025 COMPLETE CBC W/AUTO DIFF WBC: CPT

## 2018-11-19 PROCEDURE — 80202 ASSAY OF VANCOMYCIN: CPT

## 2018-11-19 PROCEDURE — 80048 BASIC METABOLIC PNL TOTAL CA: CPT

## 2018-11-19 RX ADMIN — Medication 10 ML: at 14:07

## 2018-11-19 RX ADMIN — MEMANTINE HYDROCHLORIDE 5 MG: 5 TABLET ORAL at 18:05

## 2018-11-19 RX ADMIN — METRONIDAZOLE 500 MG: 500 INJECTION, SOLUTION INTRAVENOUS at 06:19

## 2018-11-19 RX ADMIN — HYDRALAZINE HYDROCHLORIDE 50 MG: 50 TABLET, FILM COATED ORAL at 08:28

## 2018-11-19 RX ADMIN — METRONIDAZOLE 500 MG: 500 INJECTION, SOLUTION INTRAVENOUS at 12:34

## 2018-11-19 RX ADMIN — CIPROFLOXACIN HYDROCHLORIDE 1 DROP: 3 SOLUTION/ DROPS OPHTHALMIC at 19:29

## 2018-11-19 RX ADMIN — CIPROFLOXACIN HYDROCHLORIDE 1 DROP: 3 SOLUTION/ DROPS OPHTHALMIC at 15:59

## 2018-11-19 RX ADMIN — METRONIDAZOLE 500 MG: 500 INJECTION, SOLUTION INTRAVENOUS at 00:00

## 2018-11-19 RX ADMIN — HYDRALAZINE HYDROCHLORIDE 50 MG: 50 TABLET, FILM COATED ORAL at 22:04

## 2018-11-19 RX ADMIN — HYDRALAZINE HYDROCHLORIDE 50 MG: 50 TABLET, FILM COATED ORAL at 15:59

## 2018-11-19 RX ADMIN — ROSUVASTATIN CALCIUM 10 MG: 5 TABLET, FILM COATED ORAL at 22:04

## 2018-11-19 RX ADMIN — CEFTRIAXONE SODIUM 2 G: 2 INJECTION, POWDER, FOR SOLUTION INTRAMUSCULAR; INTRAVENOUS at 19:28

## 2018-11-19 RX ADMIN — LORATADINE 10 MG: 10 TABLET ORAL at 08:26

## 2018-11-19 RX ADMIN — Medication 10 ML: at 22:05

## 2018-11-19 RX ADMIN — DONEPEZIL HYDROCHLORIDE 10 MG: 5 TABLET, FILM COATED ORAL at 22:04

## 2018-11-19 RX ADMIN — VANCOMYCIN HYDROCHLORIDE 1500 MG: 10 INJECTION, POWDER, LYOPHILIZED, FOR SOLUTION INTRAVENOUS at 04:05

## 2018-11-19 RX ADMIN — CIPROFLOXACIN HYDROCHLORIDE 1 DROP: 3 SOLUTION/ DROPS OPHTHALMIC at 06:20

## 2018-11-19 RX ADMIN — MEMANTINE HYDROCHLORIDE 5 MG: 5 TABLET ORAL at 08:28

## 2018-11-19 RX ADMIN — METRONIDAZOLE 500 MG: 500 INJECTION, SOLUTION INTRAVENOUS at 18:05

## 2018-11-19 RX ADMIN — CALCIUM CARBONATE 500 MG (1,250 MG)-VITAMIN D3 200 UNIT TABLET 1 TABLET: at 08:27

## 2018-11-19 RX ADMIN — CIPROFLOXACIN HYDROCHLORIDE 1 DROP: 3 SOLUTION/ DROPS OPHTHALMIC at 18:04

## 2018-11-19 RX ADMIN — Medication 10 ML: at 06:20

## 2018-11-19 RX ADMIN — CEFTRIAXONE SODIUM 2 G: 2 INJECTION, POWDER, FOR SOLUTION INTRAMUSCULAR; INTRAVENOUS at 08:29

## 2018-11-19 RX ADMIN — VANCOMYCIN HYDROCHLORIDE 1500 MG: 10 INJECTION, POWDER, LYOPHILIZED, FOR SOLUTION INTRAVENOUS at 22:04

## 2018-11-19 RX ADMIN — POTASSIUM CHLORIDE 20 MEQ: 20 TABLET, EXTENDED RELEASE ORAL at 08:26

## 2018-11-19 RX ADMIN — CIPROFLOXACIN HYDROCHLORIDE 1 DROP: 3 SOLUTION/ DROPS OPHTHALMIC at 12:37

## 2018-11-19 RX ADMIN — CIPROFLOXACIN HYDROCHLORIDE 1 DROP: 3 SOLUTION/ DROPS OPHTHALMIC at 22:05

## 2018-11-19 RX ADMIN — AMLODIPINE BESYLATE 10 MG: 10 TABLET ORAL at 08:27

## 2018-11-19 RX ADMIN — CIPROFLOXACIN HYDROCHLORIDE 1 DROP: 3 SOLUTION/ DROPS OPHTHALMIC at 08:24

## 2018-11-19 RX ADMIN — FOLIC ACID 0.5 MG: 1 TABLET ORAL at 08:29

## 2018-11-19 RX ADMIN — POTASSIUM CHLORIDE 20 MEQ: 20 TABLET, EXTENDED RELEASE ORAL at 18:04

## 2018-11-19 NOTE — PROGRESS NOTES
Hospitalist Progress Note Admit Date:  2018  6:44 AM  
Name:  Beth Page Age:  59 y.o. 
:  1955 MRN:  [id number] PCP:  Karli, MD [place] 
Treatment Team: Attending Provider: Kristen Posadas MD; Consulting Provider: Izzy De La Garza MD; Care Manager: Alexandra Fabian RN; Utilization Review: Hugo Madden RN Subjective:  
 
 
  
From admit h and p HPI: 
  
Patient is a pleasant 63yoF with PMH significant for prior brain tumor, with  shunts in place, HTN, HLD who presents today to the ER with her  and sister for 3 days of AMS and gait disturbance.  History obtained from . Zoila Culp was at her baseline mentation until a few days ago when she became for confused with no short term memory at all.  She developed a very wobbly gait.  No falls or N/V at home.  As symptoms did not improve and  has seen her have these symptoms in the past associated with her shunt malfunctioning, he brought her to the ER for further evaluation.  CT head showed evidence of increase size of lateral ventricles.  Neurosurgery was consulted by ER and recommended Hospitalist Service to admit.  Per ER nurse, Neurosurgery has just left bedside where peritoneal shunt tap was performed with 50cc of clear CSF was removed.  Family and nurse report that patient's mentation is already improving by time of my evaluation in the ER.  Patient currently denies any complaints.  She denies HA, nausea, vomiting, abdominal pain. 
  
Today: 
Lethargic but arousable. Non contrast ct no worrisome findings reported. Rocephin 2g q12hr, vancomycin and flagyl antibiotic rx re: above. Cultures ngtd final.  
For possible perman. Shunt placement/replacement tomorrow neurosurgery. Objective:  
 
Patient Vitals for the past 24 hrs: 
 Temp Pulse Resp BP SpO2  
18 0933  84 15 (!) 128/96 99 % 18 0901  78 14 145/70 99 % 18 0832  80 18 147/72 100 % 18 0801  89 13 152/78 98 % 11/19/18 0732  73 13 143/70 99 % 11/19/18 0701  74 15 150/72 98 % 11/19/18 0632  78 13 141/75 99 % 11/19/18 0601  67 11 140/67 99 % 11/19/18 0532  75 13 143/69 99 % 11/19/18 0501  (!) 114  151/73 99 % 11/19/18 0432  74 12 140/67 98 % 11/19/18 0401  70 14 139/73 99 % 11/19/18 0344  74 18 136/69 99 % 11/19/18 0306 97.4 °F (36.3 °C) 72 15 144/75 99 % 11/19/18 0301  75 16 144/75 99 % 11/19/18 0232  76 15 146/70   
11/19/18 0201  74 13 147/66 100 % 11/19/18 0132  78 18 145/71 100 % 11/19/18 0101  80 13 147/69 100 % 11/19/18 0032  96  141/69 100 % 11/18/18 2332  73 14 147/70 100 % 11/18/18 2301 97.5 °F (36.4 °C) 88 16 157/72 100 % 11/18/18 2231  76 14 141/68 100 % 11/18/18 2201  77 13 139/67 100 % 11/18/18 2131  77 14 132/62 100 % 11/18/18 2101  83  144/72 100 % 11/18/18 2031  77 15 138/66 100 % 11/18/18 2001  84 17 139/71 100 % 11/18/18 1932  78 17 131/62 100 % 11/18/18 1908 97.5 °F (36.4 °C) 76 17 149/70 100 % 11/18/18 1901  78 14 149/70 100 % 11/18/18 1831  90 23 142/76 100 % 11/18/18 1802  79 14 143/67 100 % 11/18/18 1731  85 (!) 35 178/76 100 % 11/18/18 1701  95 30 168/79 95 % 11/18/18 1632  80 (!) 31 129/87 95 % 11/18/18 1531  79 16 126/59 100 % 11/18/18 1502 97.6 °F (36.4 °C) 80 16 129/58 100 % 11/18/18 1431  79 16 132/63 99 % 11/18/18 1402  83 14 134/63 98 % 11/18/18 1331  78 14 121/62 99 % 11/18/18 1302  81 (!) 54 139/67 100 % 11/18/18 1231  83 17 139/67 100 % 11/18/18 1202  94 17 134/67 98 % 11/18/18 1131  92 18 146/74 100 % 11/18/18 1102 97.1 °F (36.2 °C) 76 14 142/71 100 % Oxygen Therapy O2 Sat (%): 99 % (11/19/18 0933) Pulse via Oximetry: 83 beats per minute (11/19/18 0933) O2 Device: Room air (11/18/18 1908) O2 Flow Rate (L/min): 2 l/min (11/10/18 1901) Intake/Output Summary (Last 24 hours) at 11/19/2018 1050 Last data filed at 11/19/2018 1037 Gross per 24 hour Intake 1010 ml Output 2892 ml Net -1882 ml Physical Examination: 
General:    Lethargic but arousable -baseline last 5 days Head:  Drain sites clean clear dress Eyes:  Left eye conjunctival erythema improved, mild crusting CV:   RRR. No  Murmurs, clicks, or gallops Lungs:   Unlabored, no cyanosis Abdomen:   Soft, nondistended, nontender. Extremities: Warm and dry. No cyanosis or edema. Skin:     No rashes or jaundice. Neuro:  No gross focal deficits Psych:  Baseline cognitive impair pre-hospital-stable per family prior visit Data Review: 
I have reviewed all labs, meds, telemetry events, and studies from the last 24 hours. Recent Results (from the past 24 hour(s)) CBC WITH AUTOMATED DIFF Collection Time: 11/19/18  3:35 AM  
Result Value Ref Range WBC 9.3 4.3 - 11.1 K/uL  
 RBC 3.39 (L) 4.05 - 5.2 M/uL HGB 8.8 (L) 11.7 - 15.4 g/dL HCT 28.6 (L) 35.8 - 46.3 % MCV 84.4 79.6 - 97.8 FL  
 MCH 26.0 (L) 26.1 - 32.9 PG  
 MCHC 30.8 (L) 31.4 - 35.0 g/dL  
 RDW 16.9 % PLATELET 752 565 - 172 K/uL MPV 11.0 9.4 - 12.3 FL ABSOLUTE NRBC 0.00 0.0 - 0.2 K/uL  
 DF AUTOMATED NEUTROPHILS 78 43 - 78 % LYMPHOCYTES 12 (L) 13 - 44 % MONOCYTES 7 4.0 - 12.0 % EOSINOPHILS 2 0.5 - 7.8 % BASOPHILS 0 0.0 - 2.0 % IMMATURE GRANULOCYTES 1 0.0 - 5.0 %  
 ABS. NEUTROPHILS 7.2 1.7 - 8.2 K/UL  
 ABS. LYMPHOCYTES 1.1 0.5 - 4.6 K/UL  
 ABS. MONOCYTES 0.6 0.1 - 1.3 K/UL  
 ABS. EOSINOPHILS 0.2 0.0 - 0.8 K/UL  
 ABS. BASOPHILS 0.0 0.0 - 0.2 K/UL  
 ABS. IMM. GRANS. 0.1 0.0 - 0.5 K/UL METABOLIC PANEL, BASIC Collection Time: 11/19/18  3:35 AM  
Result Value Ref Range Sodium 140 136 - 145 mmol/L Potassium 4.0 3.5 - 5.1 mmol/L Chloride 106 98 - 107 mmol/L  
 CO2 28 21 - 32 mmol/L Anion gap 6 (L) 7 - 16 mmol/L Glucose 87 65 - 100 mg/dL BUN 18 8 - 23 MG/DL Creatinine 1.16 (H) 0.6 - 1.0 MG/DL  
 GFR est AA >60 >60 ml/min/1.73m2 GFR est non-AA 50 (L) >60 ml/min/1.73m2 Calcium 8.9 8.3 - 10.4 MG/DL All Micro Results Procedure Component Value Units Date/Time CULTURE, CSF Ya Gordillo [[id number]] Collected:  11/13/18 1516 Order Status:  Completed Specimen:  Cerebrospinal Fluid Updated:  11/18/18 5782 Special Requests: NO SPECIAL REQUESTS     
  GRAM STAIN NO WBCS SEEN     
   NO DEFINITE ORGANISM SEEN Culture result: NO GROWTH 5 DAYS     
 CULTURE, CSF Virgle Argenis STAIN [[id number]] Collected:  11/08/18 0912 Order Status:  Completed Specimen:  Cerebrospinal Fluid Updated:  11/13/18 0747 Special Requests: NO SPECIAL REQUESTS     
  GRAM STAIN 0 TO 3 WBCS/OIF  
   NO DEFINITE ORGANISM SEEN Culture result: NO GROWTH 5 DAYS     
 CULTURE, BLOOD [[id number]] Collected:  11/08/18 0836 Order Status:  Completed Specimen:  Blood Updated:  11/13/18 0719 Special Requests: --     
  LEFT 
FOREARM Culture result: NO GROWTH 5 DAYS     
 CULTURE, BLOOD [[id number]] Collected:  11/08/18 1864 Order Status:  Completed Specimen:  Blood Updated:  11/13/18 0719 Special Requests: --     
  RIGHT 
FOREARM Culture result: NO GROWTH 5 DAYS Current Meds: 
Current Facility-Administered Medications Medication Dose Route Frequency  Vancomycin trough reminder   Other ONCE  
 loratadine (CLARITIN) tablet 10 mg  10 mg Oral DAILY  ciprofloxacin HCl (CILOXAN) 0.3 % ophthalmic solution 1 Drop  1 Drop Both Eyes Q2HWA Followed by  
Phuc Dill ON 11/20/2018] ciprofloxacin HCl (CILOXAN) 0.3 % ophthalmic solution 1 Drop  1 Drop Both Eyes Q2HWA  amLODIPine (NORVASC) tablet 10 mg  10 mg Oral DAILY  hydrALAZINE (APRESOLINE) tablet 50 mg  50 mg Oral TID  labetalol (NORMODYNE;TRANDATE) injection 20 mg  20 mg IntraVENous Q6H PRN  
 vancomycin (VANCOCIN) 1500 mg in  ml infusion  1,500 mg IntraVENous Q18H  
 influenza vaccine 2018-19 (6 mos+)(PF) (FLUARIX QUAD/FLULAVAL QUAD) injection 0.5 mL  0.5 mL IntraMUSCular PRIOR TO DISCHARGE  metroNIDAZOLE (FLAGYL) IVPB premix 500 mg  500 mg IntraVENous Q6H  
 potassium chloride (K-DUR, KLOR-CON) SR tablet 20 mEq  20 mEq Oral BID  
 donepezil (ARICEPT) tablet 10 mg  10 mg Oral QHS  rosuvastatin (CRESTOR) tablet 10 mg  10 mg Oral QHS  memantine (NAMENDA) tablet 5 mg  5 mg Oral BID  sodium chloride (NS) flush 5-10 mL  5-10 mL IntraVENous Q8H  
 sodium chloride (NS) flush 5-10 mL  5-10 mL IntraVENous PRN  
 acetaminophen (TYLENOL) tablet 650 mg  650 mg Oral Q4H PRN  
 ondansetron (ZOFRAN) injection 4 mg  4 mg IntraVENous Q4H PRN  
 HYDROcodone-acetaminophen (NORCO) 5-325 mg per tablet 1 Tab  1 Tab Oral Q4H PRN  
 morphine injection 1 mg  1 mg IntraVENous Q4H PRN  
 hydrALAZINE (APRESOLINE) 20 mg/mL injection 10 mg  10 mg IntraVENous Q6H PRN  
 calcium-vitamin D (OS-WILNER) 500 mg-200 unit tablet  1 Tab Oral DAILY WITH BREAKFAST  folic acid (FOLVITE) tablet 0.5 mg  0.5 mg Oral DAILY  cefTRIAXone (ROCEPHIN) 2 g in 0.9% sodium chloride (MBP/ADV) 50 mL  2 g IntraVENous Q12H Diet: DIET MECHANICAL SOFT Other Studies (last 24 hours): 
Ct Head Wo Cont Result Date: 11/18/2018 CT scan of the brain 11/18/2018 Scanning was performed within 24 hours of arrival to the facility Comparison: 11/8/2018 Dose reduction techniques used: Automated exposure control, adjustment of the mAs and/or kVp according to patient size, standardized low-dose protocol, and/or iterative reconstruction technique. Indication: Mental status change Findings: Since the last examination the right frontal ventriculostomy catheter has been removed. Ventricular volume is decreased to the upper limits of normal. Bifrontal white matter lucency is again demonstrated. There are no new lesions or hemorrhage. IMPRESSION: Removal of the right frontal ventriculostomy catheter, decreased hydrocephalus. Assessment and Plan: Hospital Problems as of 11/19/2018 Date Reviewed: 12/24/2013 Codes Class Noted - Resolved POA Conjunctivitis ICD-10-CM: H10.9 ICD-9-CM: 372.30  11/18/2018 - Present Unknown Altered mental status ICD-10-CM: R41.82 
ICD-9-CM: 780.97  11/8/2018 - Present MORIS (acute kidney injury) ([facility] ICD-10-CM: N17.9 ICD-9-CM: 584.9  11/8/2018 - Present Yes * (Principal) Acute encephalopathy ICD-10-CM: G93.40 ICD-9-CM: 348.30  12/12/2013 - Present Yes Malfunction of ventriculoperitoneal shunt (HCC) ICD-10-CM: L57.00YN ICD-9-CM: 996.2 Acute 6/9/2013 - Present Yes Hypertension ICD-10-CM: I10 
ICD-9-CM: 401.9 Chronic 6/9/2013 - Present Yes A/P:   
 
Ventriculoperitoneal shunt malfunction/infection S/p removal of left ventriculoperitoneal shunt and right ventricular catheter and placement of a right external ventricular drain on 11/8 
- neurosurgery -new shunt possibly planned 11/20/18 
--consider discontinue vazquez if ok with primary service --RISK OF CAUTI 
  
Allergic vs early viral conjunctivitis- 
Appears improved today-monitor and continue loratadine and ciloxan eye gtt. 
 
  
HTN Continue to avoid diuretic and ace inh. 
- Norvasc, Hydralazine--control okThis remains unchanged 
  
  
moris early this admit-resolved with ivf and rx infection From neurosurg. Dr Laurent Alanis 11/14/18 re: follow up / plan: 
Gemini Santamaria y. o. female who is now 6 Days Post-Op from removal of her left ventriculoperitoneal shunt and right ventricular catheter and placement of a right external ventricular drain for treatment of ventriculoperitoneal shunt malfunction/infection. 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  Will plan for ventriculoperitoneal shunt placement with general surgery assistance on Tuesday 11/20/2018 if cultures remain negative   
- Continue ICU level care - Can do q2H neuro checks - Continue SCDs bilaterally for DVT Prophylaxis - Continue Diet  
- Contiue EVD at 10 cmH2O call if drain and measure ICP q1H 
- CSF no growth to date with a benign profile.  
- Please call with questions or concerns   
  
  
  
  
  
  
Proph - SCDs Full code Dispo - per NS

## 2018-11-19 NOTE — INTERDISCIPLINARY ROUNDS
Interdisciplinary team rounds were held 11/19/2018 with the following team members:Care Management, Nursing, Nurse Practitioner, Nutrition, Palliative Care, Pastoral Care, Pharmacy, Physical Therapy, Physician, Physician's Assistant, Respiratory Therapy and Clinical Coordinator and the patient. Plan of care discussed. See clinical pathway and/or care plan for interventions and desired outcomes.

## 2018-11-19 NOTE — PROGRESS NOTES
Bedside report received from [facility]. Pt resting quietly in bed. Responds to voice and oriented to self, place and situation. Disoriented to time. Lung sounds clear; 02 sats 90's on room air. Abdomen obese and soft with active bowel sounds. Dual neuro check completed with off going RN- pt follows all commands and moves extremities purposefully. Alfredo Gong in place draining yellow clear urine. Pt denies any needs at this time. VSS. Will continue to monitor.

## 2018-11-19 NOTE — PROGRESS NOTES
A follow up visit was made to the patient. Emotional support, spiritual presence and  
prayer were provided. This patient expressed her appreciation for the 's visit. Sara Briseno

## 2018-11-19 NOTE — PROGRESS NOTES
Pt's CSF drainage 22ml for this hour. Per order, drain is clamped. Will unclamp at 0940 and continue to monitor. VSS.

## 2018-11-19 NOTE — PROGRESS NOTES
Spoke with pt's , Prieto Campos about pt's scheduled OR time at 830. Stated he would be here between 0730 and 0800 to speak with Dr. Michele Green and sign consents.

## 2018-11-19 NOTE — PROGRESS NOTES
Pt's CSF 39cc this hour. Clamped drain per order. Will unclamp at 1340 and continue to monitor. VSS.

## 2018-11-20 ENCOUNTER — APPOINTMENT (OUTPATIENT)
Dept: CT IMAGING | Age: 63
DRG: 026 | End: 2018-11-20
Attending: NEUROLOGICAL SURGERY
Payer: MEDICARE

## 2018-11-20 ENCOUNTER — ANESTHESIA (OUTPATIENT)
Dept: SURGERY | Age: 63
DRG: 026 | End: 2018-11-20
Payer: MEDICARE

## 2018-11-20 LAB
ANION GAP SERPL CALC-SCNC: 8 MMOL/L (ref 7–16)
BUN SERPL-MCNC: 16 MG/DL (ref 8–23)
CALCIUM SERPL-MCNC: 8.8 MG/DL (ref 8.3–10.4)
CHLORIDE SERPL-SCNC: 106 MMOL/L (ref 98–107)
CO2 SERPL-SCNC: 25 MMOL/L (ref 21–32)
CREAT SERPL-MCNC: 1.17 MG/DL (ref 0.6–1)
ERYTHROCYTE [DISTWIDTH] IN BLOOD BY AUTOMATED COUNT: 16.9 %
GLUCOSE SERPL-MCNC: 88 MG/DL (ref 65–100)
HCT VFR BLD AUTO: 28 % (ref 35.8–46.3)
HGB BLD-MCNC: 8.9 G/DL (ref 11.7–15.4)
MCH RBC QN AUTO: 26.6 PG (ref 26.1–32.9)
MCHC RBC AUTO-ENTMCNC: 31.8 G/DL (ref 31.4–35)
MCV RBC AUTO: 83.6 FL (ref 79.6–97.8)
NRBC # BLD: 0 K/UL (ref 0–0.2)
PLATELET # BLD AUTO: 390 K/UL (ref 150–450)
PMV BLD AUTO: 10.7 FL (ref 9.4–12.3)
POTASSIUM SERPL-SCNC: 4.2 MMOL/L (ref 3.5–5.1)
RBC # BLD AUTO: 3.35 M/UL (ref 4.05–5.2)
SODIUM SERPL-SCNC: 139 MMOL/L (ref 136–145)
WBC # BLD AUTO: 9.7 K/UL (ref 4.3–11.1)

## 2018-11-20 PROCEDURE — 74011250637 HC RX REV CODE- 250/637: Performed by: FAMILY MEDICINE

## 2018-11-20 PROCEDURE — 70450 CT HEAD/BRAIN W/O DYE: CPT

## 2018-11-20 PROCEDURE — 77030031139 HC SUT VCRL2 J&J -A: Performed by: NEUROLOGICAL SURGERY

## 2018-11-20 PROCEDURE — 74011250636 HC RX REV CODE- 250/636: Performed by: FAMILY MEDICINE

## 2018-11-20 PROCEDURE — 77030003029 HC SUT VCRL J&J -B: Performed by: NEUROLOGICAL SURGERY

## 2018-11-20 PROCEDURE — 77030039425 HC BLD LARYNG TRULITE DISP TELE -A: Performed by: ANESTHESIOLOGY

## 2018-11-20 PROCEDURE — 65620000000 HC RM CCU GENERAL

## 2018-11-20 PROCEDURE — 36415 COLL VENOUS BLD VENIPUNCTURE: CPT

## 2018-11-20 PROCEDURE — 77030037400 HC ADH TISS HI VISC EXOFIN CHMP -B: Performed by: NEUROLOGICAL SURGERY

## 2018-11-20 PROCEDURE — 77030025420 HC BUR NEUR TPS STRY -C: Performed by: NEUROLOGICAL SURGERY

## 2018-11-20 PROCEDURE — 77030020268 HC MISC GENERAL SUPPLY: Performed by: NEUROLOGICAL SURGERY

## 2018-11-20 PROCEDURE — 77030020255 HC SOL INJ LR 1000ML BG

## 2018-11-20 PROCEDURE — 77030002982 HC SUT POLYSRB J&J -A: Performed by: NEUROLOGICAL SURGERY

## 2018-11-20 PROCEDURE — 77030016683 HC BUR ZYPHR1 STRY -C: Performed by: NEUROLOGICAL SURGERY

## 2018-11-20 PROCEDURE — 77030002916 HC SUT ETHLN J&J -A: Performed by: NEUROLOGICAL SURGERY

## 2018-11-20 PROCEDURE — 77030037088 HC TUBE ENDOTRACH ORAL NSL COVD-A: Performed by: ANESTHESIOLOGY

## 2018-11-20 PROCEDURE — 77030016570 HC BLNKT BAIR HGGR 3M -B: Performed by: ANESTHESIOLOGY

## 2018-11-20 PROCEDURE — 77030019908 HC STETH ESOPH SIMS -A: Performed by: ANESTHESIOLOGY

## 2018-11-20 PROCEDURE — 77030030722 HC PIN SKULL MAYFLD INLC -B: Performed by: NEUROLOGICAL SURGERY

## 2018-11-20 PROCEDURE — 77030030163 HC BN WAX J&J -A: Performed by: NEUROLOGICAL SURGERY

## 2018-11-20 PROCEDURE — 85027 COMPLETE CBC AUTOMATED: CPT

## 2018-11-20 PROCEDURE — 74011000250 HC RX REV CODE- 250: Performed by: INTERNAL MEDICINE

## 2018-11-20 PROCEDURE — 74011250637 HC RX REV CODE- 250/637: Performed by: INTERNAL MEDICINE

## 2018-11-20 PROCEDURE — 77030012670 HC VLV CSF MEDT -G: Performed by: NEUROLOGICAL SURGERY

## 2018-11-20 PROCEDURE — 74011250636 HC RX REV CODE- 250/636: Performed by: ANESTHESIOLOGY

## 2018-11-20 PROCEDURE — 77030002996 HC SUT SLK J&J -A: Performed by: NEUROLOGICAL SURGERY

## 2018-11-20 PROCEDURE — 80048 BASIC METABOLIC PNL TOTAL CA: CPT

## 2018-11-20 PROCEDURE — 77030014650 HC SEAL MTRX FLOSEL BAXT -C: Performed by: NEUROLOGICAL SURGERY

## 2018-11-20 PROCEDURE — 77030013138 HC MRK XR SPHR IZIM -B: Performed by: NEUROLOGICAL SURGERY

## 2018-11-20 PROCEDURE — 74011250636 HC RX REV CODE- 250/636

## 2018-11-20 PROCEDURE — 74011000258 HC RX REV CODE- 258: Performed by: NEUROLOGICAL SURGERY

## 2018-11-20 PROCEDURE — 77030010545: Performed by: NEUROLOGICAL SURGERY

## 2018-11-20 PROCEDURE — 74011000250 HC RX REV CODE- 250

## 2018-11-20 PROCEDURE — 76010000172 HC OR TIME 2.5 TO 3 HR INTENSV-TIER 1: Performed by: NEUROLOGICAL SURGERY

## 2018-11-20 PROCEDURE — 00160J6 BYPASS CEREBRAL VENTRICLE TO PERITONEAL CAVITY WITH SYNTHETIC SUBSTITUTE, OPEN APPROACH: ICD-10-PCS | Performed by: NEUROLOGICAL SURGERY

## 2018-11-20 PROCEDURE — 76060000036 HC ANESTHESIA 2.5 TO 3 HR: Performed by: NEUROLOGICAL SURGERY

## 2018-11-20 PROCEDURE — 77030004833 HC CATH CSF VENT MEDT -C: Performed by: NEUROLOGICAL SURGERY

## 2018-11-20 PROCEDURE — 74011250636 HC RX REV CODE- 250/636: Performed by: NEUROLOGICAL SURGERY

## 2018-11-20 PROCEDURE — 77030002933 HC SUT MCRYL J&J -A: Performed by: NEUROLOGICAL SURGERY

## 2018-11-20 PROCEDURE — 77030021678 HC GLIDESCP STAT DISP VERT -B: Performed by: ANESTHESIOLOGY

## 2018-11-20 PROCEDURE — 77030020788: Performed by: NEUROLOGICAL SURGERY

## 2018-11-20 PROCEDURE — 77030004351 HC BUR ACRN STRY -B: Performed by: NEUROLOGICAL SURGERY

## 2018-11-20 PROCEDURE — 8E09XBZ COMPUTER ASSISTED PROCEDURE OF HEAD AND NECK REGION: ICD-10-PCS | Performed by: NEUROLOGICAL SURGERY

## 2018-11-20 PROCEDURE — 77030008468 HC STPLR SKN VISIS TELE -A: Performed by: NEUROLOGICAL SURGERY

## 2018-11-20 PROCEDURE — 74011000250 HC RX REV CODE- 250: Performed by: NEUROLOGICAL SURGERY

## 2018-11-20 PROCEDURE — 00P6X0Z REMOVAL OF DRAINAGE DEVICE FROM CEREBRAL VENTRICLE, EXTERNAL APPROACH: ICD-10-PCS | Performed by: NEUROLOGICAL SURGERY

## 2018-11-20 DEVICE — SHUNT 46644 ASSY REG MED C/P-90CM: Type: IMPLANTABLE DEVICE | Site: SKULL | Status: FUNCTIONAL

## 2018-11-20 DEVICE — CATHETER VENT L23CM STD BA IMPREG R ANG CLP S STL STYL: Type: IMPLANTABLE DEVICE | Site: SKULL | Status: FUNCTIONAL

## 2018-11-20 RX ORDER — PROPOFOL 10 MG/ML
INJECTION, EMULSION INTRAVENOUS AS NEEDED
Status: DISCONTINUED | OUTPATIENT
Start: 2018-11-20 | End: 2018-11-20 | Stop reason: HOSPADM

## 2018-11-20 RX ORDER — FENTANYL CITRATE 50 UG/ML
INJECTION, SOLUTION INTRAMUSCULAR; INTRAVENOUS AS NEEDED
Status: DISCONTINUED | OUTPATIENT
Start: 2018-11-20 | End: 2018-11-20 | Stop reason: HOSPADM

## 2018-11-20 RX ORDER — ONDANSETRON 2 MG/ML
INJECTION INTRAMUSCULAR; INTRAVENOUS AS NEEDED
Status: DISCONTINUED | OUTPATIENT
Start: 2018-11-20 | End: 2018-11-20 | Stop reason: HOSPADM

## 2018-11-20 RX ORDER — NEOSTIGMINE METHYLSULFATE 1 MG/ML
INJECTION INTRAVENOUS AS NEEDED
Status: DISCONTINUED | OUTPATIENT
Start: 2018-11-20 | End: 2018-11-20 | Stop reason: HOSPADM

## 2018-11-20 RX ORDER — GLYCOPYRROLATE 0.2 MG/ML
INJECTION INTRAMUSCULAR; INTRAVENOUS AS NEEDED
Status: DISCONTINUED | OUTPATIENT
Start: 2018-11-20 | End: 2018-11-20 | Stop reason: HOSPADM

## 2018-11-20 RX ORDER — CEFAZOLIN SODIUM 1 G/3ML
INJECTION, POWDER, FOR SOLUTION INTRAMUSCULAR; INTRAVENOUS AS NEEDED
Status: DISCONTINUED | OUTPATIENT
Start: 2018-11-20 | End: 2018-11-20 | Stop reason: HOSPADM

## 2018-11-20 RX ORDER — FENTANYL CITRATE 50 UG/ML
100 INJECTION, SOLUTION INTRAMUSCULAR; INTRAVENOUS ONCE
Status: DISCONTINUED | OUTPATIENT
Start: 2018-11-20 | End: 2018-11-20

## 2018-11-20 RX ORDER — VANCOMYCIN HYDROCHLORIDE 1 G/20ML
INJECTION, POWDER, LYOPHILIZED, FOR SOLUTION INTRAVENOUS AS NEEDED
Status: DISCONTINUED | OUTPATIENT
Start: 2018-11-20 | End: 2018-11-20 | Stop reason: HOSPADM

## 2018-11-20 RX ORDER — HYDROCODONE BITARTRATE AND ACETAMINOPHEN 5; 325 MG/1; MG/1
2 TABLET ORAL AS NEEDED
Status: DISCONTINUED | OUTPATIENT
Start: 2018-11-20 | End: 2018-11-26 | Stop reason: HOSPADM

## 2018-11-20 RX ORDER — ACETAMINOPHEN 10 MG/ML
INJECTION, SOLUTION INTRAVENOUS AS NEEDED
Status: DISCONTINUED | OUTPATIENT
Start: 2018-11-20 | End: 2018-11-20 | Stop reason: HOSPADM

## 2018-11-20 RX ORDER — DEXAMETHASONE SODIUM PHOSPHATE 4 MG/ML
INJECTION, SOLUTION INTRA-ARTICULAR; INTRALESIONAL; INTRAMUSCULAR; INTRAVENOUS; SOFT TISSUE AS NEEDED
Status: DISCONTINUED | OUTPATIENT
Start: 2018-11-20 | End: 2018-11-20 | Stop reason: HOSPADM

## 2018-11-20 RX ORDER — EPHEDRINE SULFATE 50 MG/ML
INJECTION, SOLUTION INTRAVENOUS AS NEEDED
Status: DISCONTINUED | OUTPATIENT
Start: 2018-11-20 | End: 2018-11-20 | Stop reason: HOSPADM

## 2018-11-20 RX ORDER — LIDOCAINE HYDROCHLORIDE 10 MG/ML
0.1 INJECTION INFILTRATION; PERINEURAL AS NEEDED
Status: DISCONTINUED | OUTPATIENT
Start: 2018-11-20 | End: 2018-11-26 | Stop reason: HOSPADM

## 2018-11-20 RX ORDER — BUPIVACAINE HYDROCHLORIDE AND EPINEPHRINE 2.5; 5 MG/ML; UG/ML
INJECTION, SOLUTION EPIDURAL; INFILTRATION; INTRACAUDAL; PERINEURAL AS NEEDED
Status: DISCONTINUED | OUTPATIENT
Start: 2018-11-20 | End: 2018-11-20 | Stop reason: HOSPADM

## 2018-11-20 RX ORDER — SODIUM CHLORIDE, SODIUM LACTATE, POTASSIUM CHLORIDE, CALCIUM CHLORIDE 600; 310; 30; 20 MG/100ML; MG/100ML; MG/100ML; MG/100ML
75 INJECTION, SOLUTION INTRAVENOUS CONTINUOUS
Status: DISPENSED | OUTPATIENT
Start: 2018-11-20 | End: 2018-11-21

## 2018-11-20 RX ORDER — MIDAZOLAM HYDROCHLORIDE 1 MG/ML
5 INJECTION, SOLUTION INTRAMUSCULAR; INTRAVENOUS ONCE
Status: DISCONTINUED | OUTPATIENT
Start: 2018-11-20 | End: 2018-11-20

## 2018-11-20 RX ORDER — MIDAZOLAM HYDROCHLORIDE 1 MG/ML
2 INJECTION, SOLUTION INTRAMUSCULAR; INTRAVENOUS
Status: ACTIVE | OUTPATIENT
Start: 2018-11-20 | End: 2018-11-21

## 2018-11-20 RX ORDER — OXYCODONE HYDROCHLORIDE 5 MG/1
5 TABLET ORAL
Status: ACTIVE | OUTPATIENT
Start: 2018-11-20 | End: 2018-11-21

## 2018-11-20 RX ORDER — HYDROMORPHONE HYDROCHLORIDE 1 MG/ML
0.5 INJECTION, SOLUTION INTRAMUSCULAR; INTRAVENOUS; SUBCUTANEOUS
Status: DISCONTINUED | OUTPATIENT
Start: 2018-11-20 | End: 2018-11-26 | Stop reason: HOSPADM

## 2018-11-20 RX ORDER — ROCURONIUM BROMIDE 10 MG/ML
INJECTION, SOLUTION INTRAVENOUS AS NEEDED
Status: DISCONTINUED | OUTPATIENT
Start: 2018-11-20 | End: 2018-11-20 | Stop reason: HOSPADM

## 2018-11-20 RX ORDER — SODIUM CHLORIDE, SODIUM LACTATE, POTASSIUM CHLORIDE, CALCIUM CHLORIDE 600; 310; 30; 20 MG/100ML; MG/100ML; MG/100ML; MG/100ML
75 INJECTION, SOLUTION INTRAVENOUS CONTINUOUS
Status: DISCONTINUED | OUTPATIENT
Start: 2018-11-20 | End: 2018-11-20

## 2018-11-20 RX ORDER — LIDOCAINE HYDROCHLORIDE 20 MG/ML
INJECTION, SOLUTION EPIDURAL; INFILTRATION; INTRACAUDAL; PERINEURAL AS NEEDED
Status: DISCONTINUED | OUTPATIENT
Start: 2018-11-20 | End: 2018-11-20 | Stop reason: HOSPADM

## 2018-11-20 RX ADMIN — CEFAZOLIN SODIUM 2 G: 1 INJECTION, POWDER, FOR SOLUTION INTRAMUSCULAR; INTRAVENOUS at 12:38

## 2018-11-20 RX ADMIN — NEOSTIGMINE METHYLSULFATE 3 MG: 1 INJECTION INTRAVENOUS at 13:54

## 2018-11-20 RX ADMIN — HYDRALAZINE HYDROCHLORIDE 50 MG: 50 TABLET, FILM COATED ORAL at 22:08

## 2018-11-20 RX ADMIN — FENTANYL CITRATE 25 MCG: 50 INJECTION, SOLUTION INTRAMUSCULAR; INTRAVENOUS at 12:45

## 2018-11-20 RX ADMIN — ROCURONIUM BROMIDE 10 MG: 10 INJECTION, SOLUTION INTRAVENOUS at 12:23

## 2018-11-20 RX ADMIN — FENTANYL CITRATE 25 MCG: 50 INJECTION, SOLUTION INTRAMUSCULAR; INTRAVENOUS at 13:38

## 2018-11-20 RX ADMIN — ACETAMINOPHEN 1000 MG: 10 INJECTION, SOLUTION INTRAVENOUS at 13:27

## 2018-11-20 RX ADMIN — DEXAMETHASONE SODIUM PHOSPHATE 10 MG: 4 INJECTION, SOLUTION INTRA-ARTICULAR; INTRALESIONAL; INTRAMUSCULAR; INTRAVENOUS; SOFT TISSUE at 12:14

## 2018-11-20 RX ADMIN — SODIUM CHLORIDE, SODIUM LACTATE, POTASSIUM CHLORIDE, AND CALCIUM CHLORIDE: 600; 310; 30; 20 INJECTION, SOLUTION INTRAVENOUS at 11:31

## 2018-11-20 RX ADMIN — MEMANTINE HYDROCHLORIDE 5 MG: 5 TABLET ORAL at 17:13

## 2018-11-20 RX ADMIN — EPHEDRINE SULFATE 10 MG: 50 INJECTION, SOLUTION INTRAVENOUS at 12:08

## 2018-11-20 RX ADMIN — DONEPEZIL HYDROCHLORIDE 10 MG: 5 TABLET, FILM COATED ORAL at 22:08

## 2018-11-20 RX ADMIN — ROCURONIUM BROMIDE 10 MG: 10 INJECTION, SOLUTION INTRAVENOUS at 12:33

## 2018-11-20 RX ADMIN — FENTANYL CITRATE 50 MCG: 50 INJECTION, SOLUTION INTRAMUSCULAR; INTRAVENOUS at 11:40

## 2018-11-20 RX ADMIN — GLYCOPYRROLATE 0.1 MG: 0.2 INJECTION INTRAMUSCULAR; INTRAVENOUS at 12:18

## 2018-11-20 RX ADMIN — LIDOCAINE HYDROCHLORIDE 100 MG: 20 INJECTION, SOLUTION EPIDURAL; INFILTRATION; INTRACAUDAL; PERINEURAL at 11:40

## 2018-11-20 RX ADMIN — Medication 3 G: at 22:08

## 2018-11-20 RX ADMIN — GLYCOPYRROLATE 0.4 MG: 0.2 INJECTION INTRAMUSCULAR; INTRAVENOUS at 13:54

## 2018-11-20 RX ADMIN — ROSUVASTATIN CALCIUM 10 MG: 5 TABLET, FILM COATED ORAL at 22:08

## 2018-11-20 RX ADMIN — PROPOFOL 200 MG: 10 INJECTION, EMULSION INTRAVENOUS at 11:40

## 2018-11-20 RX ADMIN — POTASSIUM CHLORIDE 20 MEQ: 20 TABLET, EXTENDED RELEASE ORAL at 17:13

## 2018-11-20 RX ADMIN — Medication 10 ML: at 14:00

## 2018-11-20 RX ADMIN — CIPROFLOXACIN HYDROCHLORIDE 1 DROP: 3 SOLUTION/ DROPS OPHTHALMIC at 14:30

## 2018-11-20 RX ADMIN — CEFTRIAXONE SODIUM 2 G: 2 INJECTION, POWDER, FOR SOLUTION INTRAMUSCULAR; INTRAVENOUS at 06:30

## 2018-11-20 RX ADMIN — HYDRALAZINE HYDROCHLORIDE 50 MG: 50 TABLET, FILM COATED ORAL at 16:44

## 2018-11-20 RX ADMIN — GLYCOPYRROLATE 0.4 MG: 0.2 INJECTION INTRAMUSCULAR; INTRAVENOUS at 13:58

## 2018-11-20 RX ADMIN — METRONIDAZOLE 500 MG: 500 INJECTION, SOLUTION INTRAVENOUS at 00:03

## 2018-11-20 RX ADMIN — FENTANYL CITRATE 50 MCG: 50 INJECTION, SOLUTION INTRAMUSCULAR; INTRAVENOUS at 12:37

## 2018-11-20 RX ADMIN — SODIUM CHLORIDE, SODIUM LACTATE, POTASSIUM CHLORIDE, AND CALCIUM CHLORIDE 75 ML/HR: 600; 310; 30; 20 INJECTION, SOLUTION INTRAVENOUS at 15:12

## 2018-11-20 RX ADMIN — PROPOFOL 30 MG: 10 INJECTION, EMULSION INTRAVENOUS at 13:38

## 2018-11-20 RX ADMIN — FENTANYL CITRATE 50 MCG: 50 INJECTION, SOLUTION INTRAMUSCULAR; INTRAVENOUS at 12:18

## 2018-11-20 RX ADMIN — ROCURONIUM BROMIDE 40 MG: 10 INJECTION, SOLUTION INTRAVENOUS at 11:40

## 2018-11-20 RX ADMIN — Medication 10 ML: at 05:27

## 2018-11-20 RX ADMIN — CIPROFLOXACIN HYDROCHLORIDE 1 DROP: 3 SOLUTION/ DROPS OPHTHALMIC at 05:27

## 2018-11-20 RX ADMIN — Medication 3 G: at 15:08

## 2018-11-20 RX ADMIN — ROCURONIUM BROMIDE 10 MG: 10 INJECTION, SOLUTION INTRAVENOUS at 12:57

## 2018-11-20 RX ADMIN — ONDANSETRON 4 MG: 2 INJECTION INTRAMUSCULAR; INTRAVENOUS at 13:24

## 2018-11-20 RX ADMIN — METRONIDAZOLE 500 MG: 500 INJECTION, SOLUTION INTRAVENOUS at 05:30

## 2018-11-20 RX ADMIN — Medication 10 ML: at 22:08

## 2018-11-20 RX ADMIN — NEOSTIGMINE METHYLSULFATE 2 MG: 1 INJECTION INTRAVENOUS at 13:58

## 2018-11-20 NOTE — PROGRESS NOTES
TRANSFER - IN REPORT: 
 
[facility] received from OR for routine progression of care Report consisted of patients Situation, Background, Assessment and  
Recommendations(SBAR). Information from the following report(s) SBAR, Kardex, OR Summary and MAR was reviewed with the receiving nurse. Opportunity for questions and clarification was provided. Assessment completed upon patients arrival to unit and care assumed. Kerlex dressing placed over operation site, CDI. Patient drowsy but awakens to voice. O2 sat 98% on RA. All VS WNL.

## 2018-11-20 NOTE — ROUTINE PROCESS
TRANSFER - IN REPORT: 
 
Verbal report received from Dante Libman  on Aly Said  being received from CCU(unit) for ordered procedure Report consisted of patients Situation, Background, Assessment and  
Recommendations(SBAR). Information from the following report(s) SBAR, Kardex, Intake/Output, MAR, Recent Results, Med Rec Status, Procedure Verification and Quality Measures was reviewed with the receiving nurse. Opportunity for questions and clarification was provided. Assessment completed upon patients arrival to unit and care assumed.

## 2018-11-20 NOTE — PROGRESS NOTES
Spiritual Care visit. Initial Visit, Pre Surgery Consult. Attempted. Patient had been taken to CT;  was told that she might go directly to surgery from CT, So he went to      CT to try to see her when procedure was complete. However, he later learned that she would go back to Pre Op. Will try to see her there. Visit by Lux Samuels M.Ed., Th.B. ,Staff

## 2018-11-20 NOTE — INTERDISCIPLINARY ROUNDS
Interdisciplinary team rounds were held 11/20/2018 with the following team members:Care Management, Nursing, Nurse Practitioner, Nutrition, Palliative Care, Pastoral Care, Pharmacy, Physical Therapy, Physician, Respiratory Therapy and Clinical Coordinator. Plan of care discussed. See clinical pathway and/or care plan for interventions and desired outcomes.

## 2018-11-20 NOTE — ANESTHESIA POSTPROCEDURE EVALUATION
Procedure(s): VENTRICULAR-PERITONEAL SHUNT WITH GENERAL SURGERY FOR DISTAL ABDOMINAL PLACEMENT/ STEALTH . Anesthesia Post Evaluation Multimodal analgesia: multimodal analgesia not used between 6 hours prior to anesthesia start to PACU discharge Patient location during evaluation: bedside Patient participation: complete - patient participated Level of consciousness: awake and alert Pain score: 3 Pain management: adequate Airway patency: patent Anesthetic complications: no 
Cardiovascular status: acceptable and hemodynamically stable Respiratory status: acceptable Hydration status: acceptable Visit Vitals /80 Pulse 86 Temp 36.6 °C (97.8 °F) Resp 11 Ht 5' 6\" (1.676 m) Wt 108.9 kg (240 lb 1.3 oz) SpO2 100% Breastfeeding? No  
BMI 38.75 kg/m²

## 2018-11-20 NOTE — PROGRESS NOTES
Hospitalist Progress Note Patient: Anabelle Saini MRN: [id number]  SSN: [id number] YOB: 1955  Age: 59 y.o. Sex: female Admit Date: 11/8/2018 LOS: 12 days Subjective:  
 
59year old AAF with a PMH of HTN and brain tumor s/p  shunt placement presented to the ER on 11/8 with acute encephalopathy. She was found to have obstructive hydrocephalus and possible  shunt infection. She went for  shunt removal on 11/8. She has been on broad spectrum antibiotics since that time. 11/20 - She went for  shunt placement today. Doing well post-op. Denies HA. Eyes feels better. Denies CP/SOB. Review of systems negative except stated above. Objective:  
 
Visit Vitals /85 Pulse 76 Temp 97.6 °F (36.4 °C) Resp 9 Ht 5' 6\" (1.676 m) Wt 108.9 kg (240 lb 1.3 oz) SpO2 100% Breastfeeding? No  
BMI 38.75 kg/m² Oxygen Therapy O2 Sat (%): 100 % (11/20/18 1631) Pulse via Oximetry: 77 beats per minute (11/20/18 1631) O2 Device: Room air (11/20/18 1431) O2 Flow Rate (L/min): 2 l/min (11/10/18 1901) Intake and Output:  
 
Intake/Output Summary (Last 24 hours) at 11/20/2018 1709 Last data filed at 11/20/2018 1324 Gross per 24 hour Intake 1510 ml Output 2588 ml Net -1078 ml Physical Exam:  
GENERAL: alert, cooperative, no distress, appears stated age EYE: conjunctivae/corneas clear. PERRL. THROAT & NECK: normal and no erythema or exudates noted. LUNG: clear to auscultation bilaterally HEART: regular rate and rhythm, S1S2, no murmur, no JVD ABDOMEN: soft, non-tender, non-distended. Bowel sounds normal.  
EXTREMITIES:  No edema, 2+ pedal/radial pulses bilaterally SKIN: no rash or abnormalities NEUROLOGIC: Alert. Cranial nerves 2-12 grossly intact. Lab/Data Review: 
Recent Results (from the past 24 hour(s)) Lisa Kimball Collection Time: 11/19/18  9:40 PM  
Result Value Ref Range Vancomycin,trough 19.2 5 - 20 ug/mL CBC W/O DIFF Collection Time: 11/20/18  4:26 AM  
Result Value Ref Range WBC 9.7 4.3 - 11.1 K/uL  
 RBC 3.35 (L) 4.05 - 5.2 M/uL HGB 8.9 (L) 11.7 - 15.4 g/dL HCT 28.0 (L) 35.8 - 46.3 % MCV 83.6 79.6 - 97.8 FL  
 MCH 26.6 26.1 - 32.9 PG  
 MCHC 31.8 31.4 - 35.0 g/dL  
 RDW 16.9 % PLATELET 783 790 - 191 K/uL MPV 10.7 9.4 - 12.3 FL ABSOLUTE NRBC 0.00 0.0 - 0.2 K/uL METABOLIC PANEL, BASIC Collection Time: 11/20/18  4:26 AM  
Result Value Ref Range Sodium 139 136 - 145 mmol/L Potassium 4.2 3.5 - 5.1 mmol/L Chloride 106 98 - 107 mmol/L  
 CO2 25 21 - 32 mmol/L Anion gap 8 7 - 16 mmol/L Glucose 88 65 - 100 mg/dL BUN 16 8 - 23 MG/DL Creatinine 1.17 (H) 0.6 - 1.0 MG/DL  
 GFR est AA >60 >60 ml/min/1.73m2 GFR est non-AA 50 (L) >60 ml/min/1.73m2 Calcium 8.8 8.3 - 10.4 MG/DL Imaging: 
Ct Head Wo Cont Result Date: 11/20/2018 IMPRESSION:  1. No acute intracranial process evident by noncontrast CT study of the head. Only stable findings are seen as described above. Ct Head Wo Cont Result Date: 11/18/2018 IMPRESSION: Removal of the right frontal ventriculostomy catheter, decreased hydrocephalus. Ct Head Wo Cont Result Date: 11/8/2018 IMPRESSION: Increased size of the lateral ventricles since prior exam as detailed above. [address] Result Date: 11/10/2018 IMPRESSION:  1. No evidence of obstruction or other acute abnormality. 2.  Asymmetric thinning and hyperechogenicity of the right renal parenchyma compared with the left suggests the possibility of renal artery stenosis in this patient with hypertension. 3.  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 No results found for this visit on 11/08/18. Cultures: All Micro Results Procedure Component Value Units Date/Time CULTURE, CSF Seymour Rivera [[id number]] Collected:  11/13/18 1516  Order Status:  Completed Specimen:  Cerebrospinal Fluid Updated: 11/18/18 1883 Special Requests: NO SPECIAL REQUESTS     
  GRAM STAIN NO WBCS SEEN     
   NO DEFINITE ORGANISM SEEN Culture result: NO GROWTH 5 DAYS     
 CULTURE, CSF Derromie Bigger STAIN [[id number]] Collected:  11/08/18 0912 Order Status:  Completed Specimen:  Cerebrospinal Fluid Updated:  11/13/18 0725 Special Requests: NO SPECIAL REQUESTS     
  GRAM STAIN 0 TO 3 WBCS/OIF  
   NO DEFINITE ORGANISM SEEN Culture result: NO GROWTH 5 DAYS     
 CULTURE, BLOOD [[id number]] Collected:  11/08/18 0836 Order Status:  Completed Specimen:  Blood Updated:  11/13/18 0719 Special Requests: --     
  LEFT 
FOREARM Culture result: NO GROWTH 5 DAYS     
 CULTURE, BLOOD [[id number]] Collected:  11/08/18 3114 Order Status:  Completed Specimen:  Blood Updated:  11/13/18 0719 Special Requests: --     
  RIGHT 
FOREARM Culture result: NO GROWTH 5 DAYS Assessment/Plan:  
 
Principal Problem: 
  Infection of  (ventriculoperitoneal) shunt ([address] (12/13/2013) - S/P removal on 11/14 and replacement of new  shunt on 11/20 
- Seems to be doing well post-op - Appreciate Neurosurgery's input and assistance Active Problems: 
  Malfunction of ventriculoperitoneal shunt ([address] (6/9/2013) - S/P removal on 11/14 and replacement of new  shunt on 11/20 
- Seems to be doing well post-op - Appreciate Neurosurgery's input and assistance Acute encephalopathy (12/12/2013) - Resolved - Due to #1 Hypertension (6/9/2013) - Elevated slightly this afternoon 
- Continue Amlodipine 10mg 
- Continue Hydralazine 50mg TID MORIS (acute kidney injury) ([address] (11/8/2018) - Resolved Conjunctivitis (11/18/2018) - Much improved - Monitor for changes Brain tumor ([address] (6/9/2013) - S/P  shunt placement Dispo - Watch in ICU tonight DIET MECHANICAL SOFT 
 
DVT Prophylaxis: SCDs Signed By: Megan White DO November 20, 2018

## 2018-11-20 NOTE — PROGRESS NOTES
Patient oriented to person, place and situation, follows commands, eyes focus and track;  equal and tongue midline. NSR on monitor, S1/S2 auscultated. Breath sounds clear, symmetrical clear on room air. Bowel sounds active, abdomen intact and semi-soft. Skin tear to sacrum, incision to cranium and chest. 
 
Lines: 20G R UA 
Drains: EVD, purewick Patient denies pain. Will continue to monitor.

## 2018-11-20 NOTE — PROGRESS NOTES
NEUROSURGERY PROGRESS NOTE:  
Admit Date: 11/8/2018 Subjective: No acute overnight events. Objective: 
Visit Vitals /78 Pulse 81 Temp 98.1 °F (36.7 °C) Resp 16 Ht 5' 6\" (1.676 m) Wt 240 lb 1.3 oz (108.9 kg) SpO2 100% Breastfeeding? No  
BMI 38.75 kg/m² General: No acute distress Awake, alert, and oriented to person, Hospital, not president, oriented to year Eyes open spontaneously PERRL, EOMI with slight exotropia of the left eye at baseline Face symmetric and tongue mid-line on protrusion Patient with 5/5 strength in the bilateral upper and bilateral lower extremities Incisions: Clean, dry, and intact with dressing in place, left EVD exit site with biopatch in place with 199 cc output Assessment and Plan:  
Manpreet Vick 59 y.o. female who is now Day of Surgery from removal of her left ventriculoperitoneal shunt and right ventricular catheter and placement of a right external ventricular drain for treatment of ventriculoperitoneal shunt malfunction/infection. Cultures have remained negative with no growth to date and she has been treated with anti-biotics since presentation. - OR today for placement of a right ventriculoperitoneal shunt placement Cynda Coil. [facility] and [facility]

## 2018-11-20 NOTE — ANESTHESIA PREPROCEDURE EVALUATION
Anesthetic History PONV Review of Systems / Medical History Patient summary reviewed, nursing notes reviewed and pertinent labs reviewed Pulmonary Within defined limits Neuro/Psych  
 
seizures: well controlled Comments: Pt with hydrocephalus s/p brain tumor excision with persistent  shunt with multiple revisions. Has short term memory loss at baseline. Cardiovascular Hypertension: well controlled Exercise tolerance: >4 METS Comments:  
 
Denies cardiac history GI/Hepatic/Renal 
  
 
 
Renal disease: CRI Endo/Other Morbid obesity and anemia Other Findings Physical Exam 
 
Airway Mallampati: II 
TM Distance: 4 - 6 cm Neck ROM: normal range of motion Mouth opening: Normal 
 
 Cardiovascular Regular rate and rhythm,  S1 and S2 normal,  no murmur, click, rub, or gallop Rhythm: regular Rate: normal 
 
 
 
 Dental 
 
Dentition: Poor dentition Pulmonary Breath sounds clear to auscultation Abdominal 
GI exam deferred Other Findings Anesthetic Plan ASA: 3 Anesthesia type: general 
 
Monitoring Plan: Arterial line Induction: Intravenous Anesthetic plan and risks discussed with: Patient and Family

## 2018-11-20 NOTE — PERIOP NOTES
Patient's family unavailable for surgical update in OR waiting area and Patient's Hospital Room. JEFF Lackey @ [phone].

## 2018-11-20 NOTE — PROGRESS NOTES
Shift report received from [place], [address] [place] [address]. Dual neuro assessment completed with [place]. Patient oriented to person, place and situation.

## 2018-11-20 NOTE — PERIOP NOTES
TRANSFER - OUT REPORT: 
 
Verbal report given to Parvez Kelly RN on Winston Brand  being transferred to CCU for transfer of care. Report consisted of patients Situation, Background, Assessment and  
Recommendations(SBAR). Information from the following report(s)  was reviewed with the receiving nurse. Lines:  
Peripheral IV 11/15/18 Anterior;Proximal;Right;Superior; Upper Arm (Active) Site Assessment Clean, dry, & intact 11/20/2018  8:45 AM  
Phlebitis Assessment 0 11/20/2018  8:45 AM  
Infiltration Assessment 0 11/20/2018  8:45 AM  
Dressing Status Clean, dry, & intact 11/20/2018  8:45 AM  
Dressing Type Tape;Transparent 11/20/2018  8:45 AM  
Hub Color/Line Status Infusing;Patent 11/20/2018  8:45 AM  
Alcohol Cap Used No 11/20/2018  7:01 AM  
   
Peripheral IV 11/20/18 Left Hand (Active) Opportunity for questions and clarification was provided. Patient transported with: Transferred with monitor and O2 @ 4%.

## 2018-11-20 NOTE — PROGRESS NOTES
Pharmacokinetic Consult to Pharmacist 
 
Alvino Davisrosalio is a 59 y.o. female being treated for shunt associated infection . Height: 5' 6\" (167.6 cm)  Weight: 108.9 kg (240 lb 1.3 oz) Lab Results Component Value Date/Time BUN 16 11/20/2018 04:26 AM  
 Creatinine 1.17 (H) 11/20/2018 04:26 AM  
 WBC 9.7 11/20/2018 04:26 AM  
 Procalcitonin <0.1 11/08/2018 08:19 AM  
 Lactic acid 1.8 12/12/2013 05:10 AM  
 Lactic Acid (POC) 1.13 11/08/2018 08:16 AM  
  
Estimated Creatinine Clearance: 62.3 mL/min (A) (based on SCr of 1.17 mg/dL (H)). CULTURES: 
All Micro Results Procedure Component Value Units Date/Time CULTURE, CSF Prakash Torres [[id number]] Collected:  11/13/18 1516 Order Status:  Completed Specimen:  Cerebrospinal Fluid Updated:  11/18/18 0881 Special Requests: NO SPECIAL REQUESTS     
  GRAM STAIN NO WBCS SEEN     
   NO DEFINITE ORGANISM SEEN Culture result: NO GROWTH 5 DAYS     
 CULTURE, CSF Kavya Wick STAIN [[id number]] Collected:  11/08/18 0912 Order Status:  Completed Specimen:  Cerebrospinal Fluid Updated:  11/13/18 0725 Special Requests: NO SPECIAL REQUESTS     
  GRAM STAIN 0 TO 3 WBCS/OIF  
   NO DEFINITE ORGANISM SEEN Culture result: NO GROWTH 5 DAYS     
 CULTURE, BLOOD [[id number]] Collected:  11/08/18 0836 Order Status:  Completed Specimen:  Blood Updated:  11/13/18 0719 Special Requests: --     
  LEFT 
FOREARM Culture result: NO GROWTH 5 DAYS     
 CULTURE, BLOOD [[id number]] Collected:  11/08/18 7492 Order Status:  Completed Specimen:  Blood Updated:  11/13/18 0719 Special Requests: --     
  RIGHT 
FOREARM Culture result: NO GROWTH 5 DAYS Lab Results Component Value Date/Time Vancomycin,trough 19.2 11/19/2018 09:40 PM  
 
 
Day 11 of vancomycin. Goal trough is 15-20. I will continue the current ordered dose. Trough last night was within therapeutic range.  If SrCr continues to trend up, I would consider an empiric dose reduction. Pharmacist will continue to follow patient. Please call with any questions. Thank you, Mallory Morse, PharmD, [facility] Clinical Pharmacist 
[phone]

## 2018-11-20 NOTE — OP NOTES
NEUROSURGERY OPERATIVE REPORTS:     Patient Name: Fior Barrientos    Patient MRN: [id number]    Procedure Date: 11/20/2018     Pre-Procedure Diagnosis: Hydrocephalus and ventriculoperitoneal shunt malfunction/infection     Post-Procedure Diagnosis: SAME     Procedure: 1. Right ventriculoperitoneal placement (Medium Pressure/Non-programmable Valve)  2. Stealth Neuronavigation for placement of the right frontal ventricular catheter   3. Removal of the left frontal external ventricular drain    Surgeons:   1. Segundo Retana MD  2. Kris Stanley MD - Access Surgeon for abdominal (intraperitoneal) placement of distal catheter    Surgical Staff:  Circ-1: Yazmin Estrada RN  Circ-Relief: Perry Owens RN  Scrub Tech-1: Bradley Lebron  Scrub Tech-2: Fidencio Taylor  Scrub Tech-Relief: Nleson Padilla    Anesthesia: General endotracheal anesthesia     Blood Loss: 100 cc       Procedure In Detail:   The patient was brought to the OR and general endotracheal anesthesia was induced and the patient was intubated without difficulty or complication. The bed was turned 90 degrees and the head was turned to the left revealing the right side of the head. The head was shaved overlying the right posterior frontal, temporal, and posterior auricular region to the neck. The aforementioned area, neck, & right chest and right abdomen was prepped in a standard sterile fashion with Roque wipe,and four chloraprep sticks. The patient was then draped in a standard sterile fashion. A right frontal curve-linear incision was made and hemostasis was obtained with electrocautery. A uterine packing forceps was used to tunnel to the right posterior auricular region. Then my attention was turned to the abdomen. Dr. Sussy Donald made a 3 cm right subcostal incision and he gain abdominal access see his operative report. I then tunneled with a tunneling device from the abdominal incision to the right posterior auricular incision.   The tunneler was then used to subcutaneous tunnel from the cranial to the abdominal incision. The distal ventricular catheter and valve was primed with anti-biotic irrigation. The distal peritoneal catheter was then ipassed via the tunneling cannula distally. The tunneling catheter was then removed. The catheter and valve were then wrapped in an anti-biotic impregnated gauze sponge. A right frontal bur hole was made with the  drill bit. Stealth Neuronavigation was utilized to plan our trajectory for placement of the ventricular catheter. The dura was cauterized with bipolar and monopolar cautery and opened. A small cortisectomy was performed with bipolar electrocautery. A ventricular catheter was then passed into the frontal horn of the right lateral ventricle with a Stealth Neuronavigated Stylet. The catheter was then secured to the medium pressure valve. The valve and catheter had no evidence of kink. The distal catheter was then passed freely into the peritoneal cavity. The wounds were then irrigated with copious anti-biotic irrigation and hemostasis was achieved. The wounds were closed in a layered fashion with 2-0 Vicryl sutures. A running 3-0 nylon was applied to the cranial incision. The abdominal incision has Exofin skin glue on the skin. A sterile dressing was then applied to the abdominal and cranial incision. The drapes were then removed then stay sutures of the external ventricular drain were cut and the right external ventricular drain site and area was prepped with chloraprep. The drain was a removed and an intact catheter tip was visualized. The catheter exit site was closed with a running 4-0 Monocryl suture. The patient tolerated the procedure without complication. At the end of the case all counts were accurate and correct. A clean headwrap dressing was then applied to the patient's head at the end of the case.      Disposition: PACU and from PACU back to her room       Harman ELIDIADevonte Ramosjoyce Floresmarcel, [address] [place] [address]  and [facility] Group

## 2018-11-20 NOTE — PROGRESS NOTES
TRANSFER - OUT REPORT: 
 
Verbal report given to Waleska Bender RN on Summer Gonzalez  being transferred to Pre op(unit) for ordered procedure Report consisted of patients Situation, Background, Assessment and  
Recommendations(SBAR). Information from the following report(s) SBAR, Kardex, OR Summary, Intake/Output, MAR, Recent Results, Med Rec Status and Cardiac Rhythm SR was reviewed with the receiving nurse. Lines:  
Peripheral IV 11/15/18 Anterior;Proximal;Right;Superior; Upper Arm (Active) Site Assessment Clean, dry, & intact 11/20/2018  7:01 AM  
Phlebitis Assessment 0 11/20/2018  7:01 AM  
Infiltration Assessment 0 11/20/2018  7:01 AM  
Dressing Status Clean, dry, & intact 11/20/2018  7:01 AM  
Dressing Type Transparent;Tape 11/20/2018  7:01 AM  
Hub Color/Line Status Pink; Infusing;Patent 11/20/2018  7:01 AM  
Alcohol Cap Used No 11/20/2018  7:01 AM  
  
 
Opportunity for questions and clarification was provided. Patient transported with: 
 Monitor Registered Nurse Receiving nurse educated on use of ventricular drain and parameters. Per receiving nurse, no further questions at this time. Also told them they were free to call me with any questions or concerns they may have.

## 2018-11-20 NOTE — PROGRESS NOTES
Per nightshift RN, OR and Pre-op have pablito contacted regarding patient needing special CT and being transferred to preop for the 0830 scheduled time. Per nightshift RN the surgery staff stated that one of the staff would be taking patient to CT and they would obtain consent from family for surgical procedure.

## 2018-11-21 ENCOUNTER — APPOINTMENT (OUTPATIENT)
Dept: GENERAL RADIOLOGY | Age: 63
DRG: 026 | End: 2018-11-21
Attending: NEUROLOGICAL SURGERY
Payer: MEDICARE

## 2018-11-21 PROCEDURE — 77030008031

## 2018-11-21 PROCEDURE — 74011250636 HC RX REV CODE- 250/636: Performed by: NEUROLOGICAL SURGERY

## 2018-11-21 PROCEDURE — 74011000250 HC RX REV CODE- 250: Performed by: INTERNAL MEDICINE

## 2018-11-21 PROCEDURE — 77030020255 HC SOL INJ LR 1000ML BG

## 2018-11-21 PROCEDURE — 74011250637 HC RX REV CODE- 250/637: Performed by: INTERNAL MEDICINE

## 2018-11-21 PROCEDURE — 70360 X-RAY EXAM OF NECK: CPT

## 2018-11-21 PROCEDURE — 65270000029 HC RM PRIVATE

## 2018-11-21 PROCEDURE — 74011250637 HC RX REV CODE- 250/637: Performed by: FAMILY MEDICINE

## 2018-11-21 PROCEDURE — 74011250636 HC RX REV CODE- 250/636: Performed by: ANESTHESIOLOGY

## 2018-11-21 RX ORDER — TETRAHYDROZOLINE HCL 0.05 %
2 DROPS OPHTHALMIC (EYE) 4 TIMES DAILY
Status: DISCONTINUED | OUTPATIENT
Start: 2018-11-21 | End: 2018-11-26 | Stop reason: HOSPADM

## 2018-11-21 RX ADMIN — POTASSIUM CHLORIDE 20 MEQ: 20 TABLET, EXTENDED RELEASE ORAL at 17:50

## 2018-11-21 RX ADMIN — AMLODIPINE BESYLATE 10 MG: 10 TABLET ORAL at 08:13

## 2018-11-21 RX ADMIN — Medication 5 ML: at 21:25

## 2018-11-21 RX ADMIN — FOLIC ACID 0.5 MG: 1 TABLET ORAL at 08:03

## 2018-11-21 RX ADMIN — TETRAHYDROZOLINE HYDROCHLORIDE 2 DROP: 0.05 SOLUTION/ DROPS OPHTHALMIC at 21:25

## 2018-11-21 RX ADMIN — TETRAHYDROZOLINE HYDROCHLORIDE 2 DROP: 0.05 SOLUTION/ DROPS OPHTHALMIC at 12:02

## 2018-11-21 RX ADMIN — POTASSIUM CHLORIDE 20 MEQ: 20 TABLET, EXTENDED RELEASE ORAL at 08:13

## 2018-11-21 RX ADMIN — DONEPEZIL HYDROCHLORIDE 10 MG: 5 TABLET, FILM COATED ORAL at 21:25

## 2018-11-21 RX ADMIN — MEMANTINE HYDROCHLORIDE 5 MG: 5 TABLET ORAL at 08:13

## 2018-11-21 RX ADMIN — MEMANTINE HYDROCHLORIDE 5 MG: 5 TABLET ORAL at 17:51

## 2018-11-21 RX ADMIN — HYDRALAZINE HYDROCHLORIDE 50 MG: 50 TABLET, FILM COATED ORAL at 08:13

## 2018-11-21 RX ADMIN — HYDRALAZINE HYDROCHLORIDE 50 MG: 50 TABLET, FILM COATED ORAL at 15:42

## 2018-11-21 RX ADMIN — ROSUVASTATIN CALCIUM 10 MG: 5 TABLET, FILM COATED ORAL at 21:25

## 2018-11-21 RX ADMIN — Medication 10 ML: at 05:36

## 2018-11-21 RX ADMIN — Medication 10 ML: at 13:22

## 2018-11-21 RX ADMIN — HYDRALAZINE HYDROCHLORIDE 50 MG: 50 TABLET, FILM COATED ORAL at 21:25

## 2018-11-21 RX ADMIN — CALCIUM CARBONATE 500 MG (1,250 MG)-VITAMIN D3 200 UNIT TABLET 1 TABLET: at 07:55

## 2018-11-21 RX ADMIN — SODIUM CHLORIDE, SODIUM LACTATE, POTASSIUM CHLORIDE, AND CALCIUM CHLORIDE 75 ML/HR: 600; 310; 30; 20 INJECTION, SOLUTION INTRAVENOUS at 04:33

## 2018-11-21 RX ADMIN — Medication 3 G: at 05:35

## 2018-11-21 RX ADMIN — LORATADINE 10 MG: 10 TABLET ORAL at 08:13

## 2018-11-21 NOTE — PROGRESS NOTES
NEUROSURGERY PROGRESS NOTE:  
Admit Date: 11/8/2018 Subjective: No acute overnight events. Slightly more tremulous overnight. Objective: 
Visit Vitals /80 Pulse 85 Temp 97.7 °F (36.5 °C) Resp 19 Ht 5' 6\" (1.676 m) Wt 238 lb 15.7 oz (108.4 kg) SpO2 100% Breastfeeding? No  
BMI 38.57 kg/m² General: No acute distress Awake, alert, and oriented to person, Hospital, oriented to year, not president Eyes open spontaneously PERRL, EOMI with slight exotropia of the left eye at baseline Face symmetric and tongue mid-line on protrusion Patient with 5/5 strength in the bilateral upper and bilateral lower extremities Incisions: Headwrap riding up with some dried blood spotting Assessment and Plan:  
Manpreet Vick 59 y.o. female who is now 1 Day Post-Op from placement of a right ventriculoperitoneal shunt placement and removal of her left external ventricular drain. Cultures have remained negative with no growth to date and she has been treated with anti-biotics since presentation which were discontinued yesterday. - Patient okay for transfer to the floor with q4H neuro checks - Continue routine care 
- PT/OT and ST when able - Will follow-up shunt series - Please call with questions or concerns [facility]

## 2018-11-21 NOTE — PROGRESS NOTES
Patient alert, oriented to person, place and situation, disoriented to time. Eyes focus and track, follows commands,  equal; tongue midline and no facial droop. NSR on monitor, S1/S2 auscultated. Breath sounds coarse, symmetrical chest expansion on room air. Bowel sounds active, abdomen obese and semi-soft. Skin with incision to cranium and staples to L upper chest. Skin tear to sacrum. Lines: 18G L Hand Drips: LR  
Drains: purewick. Patient denies pain. Will continue to monitor.

## 2018-11-21 NOTE — PROGRESS NOTES
TRANSFER - IN REPORT: 
 
Verbal report received from [place] RN(name) on Dimple De La Cruz  being received from ICU(unit) for routine progression of care Report consisted of patients Situation, Background, Assessment and  
Recommendations(SBAR). Information from the following report(s) SBAR, Kardex, Procedure Summary and Recent Results was reviewed with the receiving nurse. Opportunity for questions and clarification was provided. Assessment completed upon patients arrival to unit and care assumed.

## 2018-11-21 NOTE — OP NOTES
[facility] REPORT    Name:Glenn SUE Register  MR#: [id number]  : 1955  ACCOUNT #: [de-identified]   DATE OF SERVICE: 2018    SURGEON:  Gurinder Acevedo MD    PREOPERATIVE DIAGNOSIS:  Hydrocephalus. POSTOPERATIVE DIAGNOSIS:  Hydrocephalus. PROCEDURE PERFORMED:  Ventriculoperitoneal shunt placement. ANESTHESIA: general    INDICATIONS:  A 59-year-old lady. She had recurrent hydrocephalus.  shunt scheduled by Dr. Celia Gooden. I was requested to do the abdominal part. The risks and benefits were discussed with Dr. Celia Gooden and she agreed to proceed. FINDINGS:  Successful identification of peritoneum and the free space in the peritoneal cavity were obtained. DESCRIPTION OF PROCEDURE:  After the informed consent was obtained, the patient was brought in the operating room and the patient was positioned and prepped and draped per Dr. Celia Gooden' instruction. After Dr. Celia Gooden placed the shunt and the tunnel into the upper abdominal part, I made a transverse incision right below the ribcage and the shunt was identified and I dissected down through the anterior rectus sheath. This was incised. The rectus muscle was split, left and right, and then transversalis fascia was incised and then the peritoneum was identified. The peritoneum was cut open. The peritoneal cavity was entered. After the shunt was confirmed to have a good flow, the shunt was placed in the peritoneal cavity, and the anterior rectus sheath was closed with 2-0 Vicryl stitch. The skin closed with 4-0 Vicryl stitch in subcuticular running fashion.       Luz Hernandez MD       BY / ELISHA  D: 2018 13:29     T: 2018 21:11  JOB #: [id number]

## 2018-11-21 NOTE — PROGRESS NOTES
Problem: Nutrition Deficit Goal: *Optimize nutritional status Nutrition: 
Assessment: Anthropometrics: Ht - 5' 6\", wgt - 108.4 kg (CCU bed 11/21/18), BMI 38.6 c/w obesity class II, edema - none reported. Macronutrient Needs: 
Estimated calorie needs - [id number] hector/day (11-15 hector/kg/day) Estimated protein needs - 47-71 gm pro/day (0.8-1.2 gm pro/kgIBW/day) (GFR >60 ml/min) Intake/Comparative Standards: This patient's average intake of mechanical soft diet for the past 7 recorded days/7 meals: 100%. This potentially meets >100% of calorie and >100% of protein goals. Diet: 
           Mechanical soft. Food/Nutrition History: 
           The patient is eating well without any difficulties. She has no questions or concerns regarding food and nutrition at this time.  
  
Intervention: 
Meals and Snacks: Mechanical soft as tolerated. Coordination of Nutrition Care by a Nutrition Professional: AM CCU rounds. Discharge Nutrition Plan: No discharge needs at this time. Nicholas Pelaez John [address], [address],   
[phone]

## 2018-11-21 NOTE — PROGRESS NOTES
Attempted to call spouse to discuss d/c POC. No answer. PPD already placed. Will need PT/OT when medically stable per MD. CM to follow and assist with d/c POC.

## 2018-11-21 NOTE — PROGRESS NOTES
TRANSFER - OUT REPORT: 
 
Verbal report given to Gloria Hair on Eddie Carbon  being transferred to 702(unit) for routine progression of care Report consisted of patients Situation, Background, Assessment and  
Recommendations(SBAR). Information from the following report(s) SBAR, Kardex, OR Summary, Procedure Summary, Intake/Output, MAR, Recent Results, Med Rec Status and Cardiac Rhythm SR was reviewed with the receiving nurse. Lines:  
Peripheral IV 11/20/18 Left Hand (Active) Site Assessment Clean, dry, & intact 11/21/2018  7:02 AM  
Phlebitis Assessment 0 11/21/2018  7:02 AM  
Infiltration Assessment 0 11/21/2018  7:02 AM  
Dressing Status Clean, dry, & intact 11/21/2018  7:02 AM  
Dressing Type Transparent;Tape 11/21/2018  7:02 AM  
Hub Color/Line Status Green; Infusing;Patent 11/21/2018  7:02 AM  
Alcohol Cap Used No 11/21/2018  7:02 AM  
  
 
Opportunity for questions and clarification was provided.

## 2018-11-21 NOTE — PROGRESS NOTES
Hospitalist Progress Note Patient: Neil Maurer MRN: [id number]  SSN: [id number] YOB: 1955  Age: 59 y.o. Sex: female Admit Date: 11/8/2018 LOS: 13 days Subjective:  
 
59year old AAF with a PMH of HTN and brain tumor s/p  shunt placement presented to the ER on 11/8 with acute encephalopathy. She was found to have obstructive hydrocephalus and possible  shunt infection. She went for  shunt removal on 11/8. She has been on broad spectrum antibiotics since that time. 11/20 - She went for  shunt placement today. Doing well post-op. Denies HA. Eyes feels better. Denies CP/SOB. 11/21 - She complains of left eye itchiness this AM. Denies HA. Denies CP/SOB. She has had some intermittent confusion this AM asking where \"the girl that brought me here\" is. Currently oriented. Review of systems negative except stated above. Objective:  
 
Visit Vitals /65 Pulse (!) 109 Temp 97.7 °F (36.5 °C) Resp 17 Ht 5' 6\" (1.676 m) Wt 108.4 kg (238 lb 15.7 oz) SpO2 100% Breastfeeding? No  
BMI 38.57 kg/m² Oxygen Therapy O2 Sat (%): 100 % (11/21/18 1131) Pulse via Oximetry: 110 beats per minute (11/21/18 1131) O2 Device: Room air (11/21/18 9905) O2 Flow Rate (L/min): 2 l/min (11/10/18 1901) Intake and Output:  
 
Intake/Output Summary (Last 24 hours) at 11/21/2018 1159 Last data filed at 11/21/2018 1028 Gross per 24 hour Intake 1663 ml Output 2100 ml Net -437 ml Physical Exam:  
GENERAL: alert, cooperative, no distress, appears stated age EYE: conjunctivae/corneas clear. PERRL. THROAT & NECK: normal and no erythema or exudates noted. LUNG: clear to auscultation bilaterally HEART: regular rate and rhythm, S1S2, no murmur, no JVD ABDOMEN: soft, non-tender, non-distended. Bowel sounds normal.  
EXTREMITIES:  No edema, 2+ pedal/radial pulses bilaterally SKIN: no rash or abnormalities NEUROLOGIC: Alert. Cranial nerves 2-12 grossly intact. Lab/Data Review: No results found for this or any previous visit (from the past 24 hour(s)). Imaging: 
Ct Head Wo Cont Result Date: 11/20/2018 IMPRESSION:  1. No acute intracranial process evident by noncontrast CT study of the head. Only stable findings are seen as described above. Ct Head Wo Cont Result Date: 11/18/2018 IMPRESSION: Removal of the right frontal ventriculostomy catheter, decreased hydrocephalus. Ct Head Wo Cont Result Date: 11/8/2018 IMPRESSION: Increased size of the lateral ventricles since prior exam as detailed above. [address] Result Date: 11/10/2018 IMPRESSION:  1. No evidence of obstruction or other acute abnormality. 2.  Asymmetric thinning and hyperechogenicity of the right renal parenchyma compared with the left suggests the possibility of renal artery stenosis in this patient with hypertension. 3.  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 No results found for this visit on 11/08/18. Cultures: All Micro Results Procedure Component Value Units Date/Time CULTURE, CSF Nieves Javier [[id number]] Collected:  11/13/18 1516 Order Status:  Completed Specimen:  Cerebrospinal Fluid Updated:  11/18/18 6200 Special Requests: NO SPECIAL REQUESTS     
  GRAM STAIN NO WBCS SEEN     
   NO DEFINITE ORGANISM SEEN Culture result: NO GROWTH 5 DAYS     
 CULTURE, CSF Nidia Yoel STAIN [[id number]] Collected:  11/08/18 0912 Order Status:  Completed Specimen:  Cerebrospinal Fluid Updated:  11/13/18 0767 Special Requests: NO SPECIAL REQUESTS     
  GRAM STAIN 0 TO 3 WBCS/OIF  
   NO DEFINITE ORGANISM SEEN Culture result: NO GROWTH 5 DAYS     
 CULTURE, BLOOD [[id number]] Collected:  11/08/18 0836 Order Status:  Completed Specimen:  Blood Updated:  11/13/18 0719 Special Requests: --     
  LEFT 
FOREARM Culture result: NO GROWTH 5 DAYS CULTURE, BLOOD [[id number]] Collected:  11/08/18 5426 Order Status:  Completed Specimen:  Blood Updated:  11/13/18 0719 Special Requests: --     
  RIGHT 
FOREARM Culture result: NO GROWTH 5 DAYS Assessment/Plan:  
 
Principal Problem: 
  Infection of  (ventriculoperitoneal) shunt ([address] (12/13/2013) - S/P removal on 11/14 and replacement of new  shunt on 11/20 
- Doing well post-op - Appreciate Neurosurgery's input and assistance Active Problems: 
  Malfunction of ventriculoperitoneal shunt ([address] (6/9/2013) - S/P removal on 11/14 and replacement of new  shunt on 11/20 
- Doing well post-op - Appreciate Neurosurgery's input and assistance Acute encephalopathy (12/12/2013) - Had some intermittent confusion this AM 
- Due to #1 
- Now A&Ox3 so will monitor Hypertension (6/9/2013) - Improved this AM 
- Continue Amlodipine 10mg 
- Continue Hydralazine 50mg TID MORIS (acute kidney injury) ([address] (11/8/2018) - Resolved Conjunctivitis (11/18/2018) - Much improved, but continues to rub left eye - Add Opti-clear drops - Monitor for changes Brain tumor ([address] (6/9/2013) - S/P  shunt placement Dispo - Transfer to floor DIET MECHANICAL SOFT 
 
DVT Prophylaxis: SCDs Signed By: Sonny Faust DO November 21, 2018

## 2018-11-21 NOTE — PROGRESS NOTES
Physical Therapy Note: 
 
Participated in interdisciplinary rounds in ICU/CCU and chart reviewed. Patient is experiencing decrease in function from baseline. Patient would benefit from skilled acute therapy to increase independence with self care/ADLs, strength, endurance, and functional mobility. Recommend PT/OT consult when medically stable and MD agrees. Thank you for your consideration, LENY HustonT

## 2018-11-21 NOTE — PROGRESS NOTES
A follow up visit was made to the patient. Emotional support, spiritual presence and  
prayer were provided. Her niece was present. [facility]

## 2018-11-22 PROCEDURE — 74011250637 HC RX REV CODE- 250/637: Performed by: INTERNAL MEDICINE

## 2018-11-22 PROCEDURE — 74011250637 HC RX REV CODE- 250/637: Performed by: FAMILY MEDICINE

## 2018-11-22 PROCEDURE — 65270000029 HC RM PRIVATE

## 2018-11-22 PROCEDURE — 74011000302 HC RX REV CODE- 302: Performed by: HOSPITALIST

## 2018-11-22 PROCEDURE — 86580 TB INTRADERMAL TEST: CPT | Performed by: HOSPITALIST

## 2018-11-22 RX ADMIN — MEMANTINE HYDROCHLORIDE 5 MG: 5 TABLET ORAL at 08:54

## 2018-11-22 RX ADMIN — POTASSIUM CHLORIDE 20 MEQ: 20 TABLET, EXTENDED RELEASE ORAL at 18:00

## 2018-11-22 RX ADMIN — CALCIUM CARBONATE 500 MG (1,250 MG)-VITAMIN D3 200 UNIT TABLET 1 TABLET: at 08:54

## 2018-11-22 RX ADMIN — HYDRALAZINE HYDROCHLORIDE 50 MG: 50 TABLET, FILM COATED ORAL at 08:54

## 2018-11-22 RX ADMIN — ROSUVASTATIN CALCIUM 10 MG: 5 TABLET, FILM COATED ORAL at 21:28

## 2018-11-22 RX ADMIN — DONEPEZIL HYDROCHLORIDE 10 MG: 5 TABLET, FILM COATED ORAL at 21:27

## 2018-11-22 RX ADMIN — HYDRALAZINE HYDROCHLORIDE 50 MG: 50 TABLET, FILM COATED ORAL at 16:00

## 2018-11-22 RX ADMIN — Medication 10 ML: at 21:29

## 2018-11-22 RX ADMIN — TETRAHYDROZOLINE HYDROCHLORIDE 2 DROP: 0.05 SOLUTION/ DROPS OPHTHALMIC at 21:29

## 2018-11-22 RX ADMIN — LORATADINE 10 MG: 10 TABLET ORAL at 08:54

## 2018-11-22 RX ADMIN — MEMANTINE HYDROCHLORIDE 5 MG: 5 TABLET ORAL at 18:00

## 2018-11-22 RX ADMIN — POTASSIUM CHLORIDE 20 MEQ: 20 TABLET, EXTENDED RELEASE ORAL at 08:54

## 2018-11-22 RX ADMIN — Medication 5 ML: at 13:27

## 2018-11-22 RX ADMIN — TUBERCULIN PURIFIED PROTEIN DERIVATIVE 5 UNITS: 5 INJECTION, SOLUTION INTRADERMAL at 13:23

## 2018-11-22 RX ADMIN — TETRAHYDROZOLINE HYDROCHLORIDE 2 DROP: 0.05 SOLUTION/ DROPS OPHTHALMIC at 12:05

## 2018-11-22 RX ADMIN — TETRAHYDROZOLINE HYDROCHLORIDE 2 DROP: 0.05 SOLUTION/ DROPS OPHTHALMIC at 18:00

## 2018-11-22 RX ADMIN — HYDRALAZINE HYDROCHLORIDE 50 MG: 50 TABLET, FILM COATED ORAL at 21:28

## 2018-11-22 RX ADMIN — TETRAHYDROZOLINE HYDROCHLORIDE 2 DROP: 0.05 SOLUTION/ DROPS OPHTHALMIC at 08:59

## 2018-11-22 RX ADMIN — AMLODIPINE BESYLATE 10 MG: 10 TABLET ORAL at 08:53

## 2018-11-22 RX ADMIN — FOLIC ACID 0.5 MG: 1 TABLET ORAL at 08:54

## 2018-11-22 RX ADMIN — Medication 5 ML: at 06:00

## 2018-11-22 NOTE — PROGRESS NOTES
Problem: Falls - Risk of 
Goal: *Absence of Falls Document Shakira Kappa Fall Risk and appropriate interventions in the flowsheet. Outcome: Progressing Towards Goal 
Fall Risk Interventions: 
Mobility Interventions: Bed/chair exit alarm, Communicate number of staff needed for ambulation/transfer Mentation Interventions: Adequate sleep, hydration, pain control, Bed/chair exit alarm, Door open when patient unattended, Evaluate medications/consider consulting pharmacy, Familiar objects from home, More frequent rounding, Toileting rounds, Update white board Medication Interventions: Bed/chair exit alarm, Evaluate medications/consider consulting pharmacy, Patient to call before getting OOB, Teach patient to arise slowly Elimination Interventions: Bed/chair exit alarm, Call light in reach, Patient to call for help with toileting needs, Toilet paper/wipes in reach, Toileting schedule/hourly rounds

## 2018-11-22 NOTE — PROGRESS NOTES
Hospitalist Progress Note Patient: Rachel June MRN: [id number]  SSN: [id number] YOB: 1955  Age: 59 y.o. Sex: female Admit Date: 11/8/2018 LOS: 14 days Subjective:  
 
59year old AAF with a PMH of HTN and brain tumor s/p  shunt placement presented to the ER on 11/8 with acute encephalopathy. She was found to have obstructive hydrocephalus and possible  shunt infection. She went for  shunt removal on 11/8. She has been on broad spectrum antibiotics since that time. 11/22 - Pt seen at bedside Pt's daughter present in the room as well. Pt denies headache, nausea/vomiting, fever, blurry vision, SOB, cough. No overnight events. Review of systems negative except stated above. Objective:  
 
Visit Vitals /77 (BP 1 Location: Left arm, BP Patient Position: At rest) Pulse 86 Temp 97.8 °F (36.6 °C) Resp 16 Ht 5' 6\" (1.676 m) Wt 108.4 kg (238 lb 15.7 oz) SpO2 97% Breastfeeding? No  
BMI 38.57 kg/m² Oxygen Therapy O2 Sat (%): 97 % (11/22/18 0708) Pulse via Oximetry: 94 beats per minute (11/21/18 1532) O2 Device: Room air (11/21/18 3981) O2 Flow Rate (L/min): 2 l/min (11/10/18 1901) Intake and Output:  
 
Intake/Output Summary (Last 24 hours) at 11/22/2018 6529 Last data filed at 11/22/2018 9214 Gross per 24 hour Intake  Output 2650 ml Net -2650 ml Physical Exam:  
GENERAL: alert, cooperative, no distress, appears stated age EYE: conjunctivae/corneas clear. PERRL. THROAT & NECK: normal and no erythema or exudates noted. LUNG: clear to auscultation bilaterally HEART: regular rate and rhythm, S1S2, no murmur, no JVD ABDOMEN: soft, non-tender, non-distended. Bowel sounds normal.  
EXTREMITIES:  No edema, 2+ pedal/radial pulses bilaterally SKIN: no rash or abnormalities NEUROLOGIC: Cranial nerves 2-12 grossly intact. Psych:     AOx4, mood and affect appropriate Lab/Data Review: No results found for this or any previous visit (from the past 24 hour(s)). Imaging: 
Xr Shunt Series Result Date: 11/21/2018 IMPRESSION: Shunt tubing appears intact. Ct Head Wo Cont Result Date: 11/20/2018 IMPRESSION:  1. No acute intracranial process evident by noncontrast CT study of the head. Only stable findings are seen as described above. Ct Head Wo Cont Result Date: 11/18/2018 IMPRESSION: Removal of the right frontal ventriculostomy catheter, decreased hydrocephalus. Ct Head Wo Cont Result Date: 11/8/2018 IMPRESSION: Increased size of the lateral ventricles since prior exam as detailed above. [address] Result Date: 11/10/2018 IMPRESSION:  1. No evidence of obstruction or other acute abnormality. 2.  Asymmetric thinning and hyperechogenicity of the right renal parenchyma compared with the left suggests the possibility of renal artery stenosis in this patient with hypertension. 3.  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 No results found for this visit on 11/08/18. Cultures: All Micro Results Procedure Component Value Units Date/Time CULTURE, CSF Marj Sun [[id number]] Collected:  11/13/18 1516 Order Status:  Completed Specimen:  Cerebrospinal Fluid Updated:  11/18/18 4361 Special Requests: NO SPECIAL REQUESTS     
  GRAM STAIN NO WBCS SEEN     
   NO DEFINITE ORGANISM SEEN Culture result: NO GROWTH 5 DAYS     
 CULTURE, CSF Dolores Creeks STAIN [[id number]] Collected:  11/08/18 0912 Order Status:  Completed Specimen:  Cerebrospinal Fluid Updated:  11/13/18 0727 Special Requests: NO SPECIAL REQUESTS     
  GRAM STAIN 0 TO 3 WBCS/OIF  
   NO DEFINITE ORGANISM SEEN Culture result: NO GROWTH 5 DAYS     
 CULTURE, BLOOD [[id number]] Collected:  11/08/18 0836 Order Status:  Completed Specimen:  Blood Updated:  11/13/18 0719 Special Requests: --     
  LEFT 
FOREARM Culture result: NO GROWTH 5 DAYS CULTURE, BLOOD [[id number]] Collected:  11/08/18 9147 Order Status:  Completed Specimen:  Blood Updated:  11/13/18 0719 Special Requests: --     
  RIGHT 
FOREARM Culture result: NO GROWTH 5 DAYS Assessment/Plan:  
 
Principal Problem: 
  Infection of  (ventriculoperitoneal) shunt ([address] (12/13/2013) - S/P removal on 11/14 and replacement of new  shunt on 11/20 
- Doing well post-op - Appreciate Neurosurgery's input and assistance Active Problems: 
  Malfunction of ventriculoperitoneal shunt ([address] (6/9/2013) - S/P removal on 11/14 and replacement of new  shunt on 11/20 
- Doing well post-op - Appreciate Neurosurgery's input and assistance Acute encephalopathy (12/12/2013) - Had some intermittent confusion this AM 
- Due to #1 
- Now A&Ox3 so will monitor Hypertension (6/9/2013) - blood pressure is optimally controlled - Continue Amlodipine 10mg 
- Continue Hydralazine 50mg TID MORIS (acute kidney injury) ([address] (11/8/2018) - Resolved Conjunctivitis (11/18/2018) - improved, no lacrimation or itching 
- continue Opti-clear drops - Monitor for changes Brain tumor ([address] (6/9/2013) - S/P  shunt placement Dispo - will need PT/OT eval prior to discharge. Will benefit from STR 
 
DIET MECHANICAL SOFT 
 
DVT Prophylaxis: SCDs PPD placed Signed By: An Rogers MD   
 November 22, 2018

## 2018-11-22 NOTE — PROGRESS NOTES
Problem: Falls - Risk of 
Goal: *Absence of Falls Document Fabi Rondone Fall Risk and appropriate interventions in the flowsheet. Outcome: Progressing Towards Goal 
Fall Risk Interventions: 
Mobility Interventions: Bed/chair exit alarm Mentation Interventions: Adequate sleep, hydration, pain control, Bed/chair exit alarm, Door open when patient unattended Medication Interventions: Bed/chair exit alarm, Evaluate medications/consider consulting pharmacy, Patient to call before getting OOB Elimination Interventions: Bed/chair exit alarm, Call light in reach Problem: Pressure Injury - Risk of 
Goal: *Prevention of pressure injury Document Waylon Scale and appropriate interventions in the flowsheet. Outcome: Progressing Towards Goal 
Pressure Injury Interventions: 
Sensory Interventions: Assess changes in LOC, Discuss PT/OT consult with provider, Float heels, Keep linens dry and wrinkle-free, Minimize linen layers Moisture Interventions: Absorbent underpads, Limit adult briefs, Minimize layers Activity Interventions: Increase time out of bed, Pressure redistribution bed/mattress(bed type), PT/OT evaluation Mobility Interventions: HOB 30 degrees or less, Pressure redistribution bed/mattress (bed type), PT/OT evaluation Nutrition Interventions: Document food/fluid/supplement intake Friction and Shear Interventions: HOB 30 degrees or less, Minimize layers, Apply protective barrier, creams and emollients

## 2018-11-22 NOTE — PROGRESS NOTES
NEUROSURGERY PROGRESS NOTE:  
Admit Date: 11/8/2018 Subjective: No acute overnight events. Tolerated transfer to the floor well Objective: 
Visit Vitals /77 (BP 1 Location: Left arm, BP Patient Position: At rest) Pulse 86 Temp 97.8 °F (36.6 °C) Resp 16 Ht 5' 6\" (1.676 m) Wt 238 lb 15.7 oz (108.4 kg) SpO2 97% Breastfeeding? No  
BMI 38.57 kg/m² General: No acute distress Awake, alert, and oriented to person, Hospital, oriented to year, not president Eyes open spontaneously PERRL, EOMI with slight exotropia of the left eye at baseline Slight left frontalis weakness baseline Patient with 5/5 strength in the bilateral upper and bilateral lower extremities Incisions: Incisions clean, dry, and intact with sutures on the skin and staples on posterior auricular incision, right abdominal incision clean, dry, and intact with dermabond on the skin. Assessment and Plan:  
Anabelle Saini 59 y.o. female who is now 2 Days Post-Op from placement of a right ventriculoperitoneal shunt placement and removal of her left external ventricular drain. She will need placement likely in a SNF or a rehab - Patient okay for transfer to the floor with q4H neuro checks - Continue routine care 
- PT/OT and ST when able - Shunt series reviewed - Please call with questions or concerns [place] Michel. [address] [facility]

## 2018-11-22 NOTE — PROGRESS NOTES
Problem: Falls - Risk of 
Goal: *Absence of Falls Document Nestor Prince Fall Risk and appropriate interventions in the flowsheet. Outcome: Progressing Towards Goal 
Fall Risk Interventions: 
Mobility Interventions: Bed/chair exit alarm, Communicate number of staff needed for ambulation/transfer Mentation Interventions: Adequate sleep, hydration, pain control, Bed/chair exit alarm, Door open when patient unattended, Evaluate medications/consider consulting pharmacy, Familiar objects from home, More frequent rounding, Toileting rounds, Update white board Medication Interventions: Bed/chair exit alarm, Evaluate medications/consider consulting pharmacy, Patient to call before getting OOB, Teach patient to arise slowly Elimination Interventions: Bed/chair exit alarm, Call light in reach, Patient to call for help with toileting needs, Toilet paper/wipes in reach, Toileting schedule/hourly rounds

## 2018-11-23 LAB
MM INDURATION POC: 0 MM (ref 0–5)
PPD POC: NEGATIVE NEGATIVE

## 2018-11-23 PROCEDURE — 74011250637 HC RX REV CODE- 250/637: Performed by: INTERNAL MEDICINE

## 2018-11-23 PROCEDURE — 97535 SELF CARE MNGMENT TRAINING: CPT

## 2018-11-23 PROCEDURE — 97162 PT EVAL MOD COMPLEX 30 MIN: CPT

## 2018-11-23 PROCEDURE — 74011250637 HC RX REV CODE- 250/637: Performed by: FAMILY MEDICINE

## 2018-11-23 PROCEDURE — 97166 OT EVAL MOD COMPLEX 45 MIN: CPT

## 2018-11-23 PROCEDURE — 65270000029 HC RM PRIVATE

## 2018-11-23 RX ADMIN — HYDRALAZINE HYDROCHLORIDE 50 MG: 50 TABLET, FILM COATED ORAL at 17:04

## 2018-11-23 RX ADMIN — AMLODIPINE BESYLATE 10 MG: 10 TABLET ORAL at 08:34

## 2018-11-23 RX ADMIN — HYDRALAZINE HYDROCHLORIDE 50 MG: 50 TABLET, FILM COATED ORAL at 21:14

## 2018-11-23 RX ADMIN — Medication 10 ML: at 14:28

## 2018-11-23 RX ADMIN — TETRAHYDROZOLINE HYDROCHLORIDE 2 DROP: 0.05 SOLUTION/ DROPS OPHTHALMIC at 17:12

## 2018-11-23 RX ADMIN — FOLIC ACID 0.5 MG: 1 TABLET ORAL at 08:34

## 2018-11-23 RX ADMIN — HYDRALAZINE HYDROCHLORIDE 50 MG: 50 TABLET, FILM COATED ORAL at 08:34

## 2018-11-23 RX ADMIN — Medication 10 ML: at 21:14

## 2018-11-23 RX ADMIN — MEMANTINE HYDROCHLORIDE 5 MG: 5 TABLET ORAL at 17:04

## 2018-11-23 RX ADMIN — ROSUVASTATIN CALCIUM 10 MG: 5 TABLET, FILM COATED ORAL at 21:14

## 2018-11-23 RX ADMIN — Medication 10 ML: at 06:00

## 2018-11-23 RX ADMIN — DONEPEZIL HYDROCHLORIDE 10 MG: 5 TABLET, FILM COATED ORAL at 21:14

## 2018-11-23 RX ADMIN — TETRAHYDROZOLINE HYDROCHLORIDE 2 DROP: 0.05 SOLUTION/ DROPS OPHTHALMIC at 08:39

## 2018-11-23 RX ADMIN — POTASSIUM CHLORIDE 20 MEQ: 20 TABLET, EXTENDED RELEASE ORAL at 08:34

## 2018-11-23 RX ADMIN — TETRAHYDROZOLINE HYDROCHLORIDE 2 DROP: 0.05 SOLUTION/ DROPS OPHTHALMIC at 21:18

## 2018-11-23 RX ADMIN — POTASSIUM CHLORIDE 20 MEQ: 20 TABLET, EXTENDED RELEASE ORAL at 17:04

## 2018-11-23 RX ADMIN — LORATADINE 10 MG: 10 TABLET ORAL at 08:34

## 2018-11-23 RX ADMIN — MEMANTINE HYDROCHLORIDE 5 MG: 5 TABLET ORAL at 08:33

## 2018-11-23 RX ADMIN — TETRAHYDROZOLINE HYDROCHLORIDE 2 DROP: 0.05 SOLUTION/ DROPS OPHTHALMIC at 14:28

## 2018-11-23 RX ADMIN — CALCIUM CARBONATE 500 MG (1,250 MG)-VITAMIN D3 200 UNIT TABLET 1 TABLET: at 08:33

## 2018-11-23 NOTE — PROGRESS NOTES
Problem: Self Care Deficits Care Plan (Adult) Goal: *Acute Goals and Plan of Care (Insert Text) 1. Patient will complete upper body bathing and dressing with setup and adaptive equipment as needed. 2. Patient will complete lower body bathing and dressing with min A and adaptive equipment as needed. 3. Patient will complete toileting with min A.  
4. Patient will tolerate 25 minutes of OT treatment with 2 rest breaks to increase activity tolerance for ADLs. 5. Patient will complete functional transfers with min A and adaptive equipment as needed. 6. Patient will complete grooming in supported sitting at sink level after setup. Timeframe: 7 visits OCCUPATIONAL THERAPY: Initial Assessment, Daily Note, Treatment Day: Day of Assessment and PM 11/23/2018INPATIENT: Hospital Day: 16 Payor: CARE IMPROVEMENT PLUS [place] / Product Type: Managed Care Medicare /  
  
NAME/AGE/GENDER: Mikaela Ambrosio is a 59 y.o. female PRIMARY DIAGNOSIS:  Altered mental status Altered mental status Infection of  (ventriculoperitoneal) shunt (HCC) Infection of  (ventriculoperitoneal) shunt ([facility] Procedure(s) (LRB): VENTRICULAR-PERITONEAL SHUNT WITH GENERAL SURGERY FOR DISTAL ABDOMINAL PLACEMENT/ STEALTH  (N/A) 3 Days Post-Op ICD-10: Treatment Diagnosis:  
 · Generalized Muscle Weakness (M62.81) · Other lack of cordination (R27.8) · Difficulty in walking, Not elsewhere classified (R26.2) Precautions/Allergies: 
  falls, Patient has no known allergies. ASSESSMENT:  
Ms. Keon Morales is a 58 YO R dominant AAF admitted with above and underwent above surgery. Pt drowsy but Alert for answering questions A & O x4. Keeps her L eye closed and seems to be neglecting L UE. Able to read OT ID and board for reading date, squinting, reports glasses all the time. Agreeable to OT eval and getting up. Noted when covers pulled back that pt had wet and and soiled brief, purewick not working.   CNA entered and assisted OT with cleaning pt and replacing purewick. Pt rolling min A for brief but unable to clean self. Pt sat up to edge of bed with mod A and maintained sitting balance for a few minutes then asked to return to supine and was fatiguing fast.  Pt able to reach B UEs over her head in sitting but balance decreased and she started leaning backwards. Returned to supine with Max A x2. Is generally weak overall. Pt is functioning below her baseline and would benefit from skilled OT to maximize her independence, safety, and activity tolerance for ADLs and mobility. Will need further rehab. This section established at most recent assessment PROBLEM LIST (Impairments causing functional limitations): 1. Decreased Strength 2. Decreased ADL/Functional Activities 3. Decreased Transfer Abilities 4. Decreased Ambulation Ability/Technique 5. Decreased Balance 6. Decreased Activity Tolerance 7. Decreased Cognition INTERVENTIONS PLANNED: (Benefits and precautions of occupational therapy have been discussed with the patient.) 1. Activities of daily living training 2. Adaptive equipment training 3. Balance training 4. Clothing management 5. Cognitive training 6. Neuromuscular re-eduation 7. Therapeutic activity 8. Therapeutic exercise TREATMENT PLAN: Frequency/Duration: Follow patient 3x per week to address above goals. Rehabilitation Potential For Stated Goals: Good RECOMMENDED REHABILITATION/EQUIPMENT: (at time of discharge pending progress): Due to the probability of continued deficits (see above) this patient will likely need continued skilled occupational therapy after discharge. Equipment: ? TBD based on progress OCCUPATIONAL PROFILE AND HISTORY:  
History of Present Injury/Illness (Reason for Referral): 
See H & P Past Medical History/Comorbidities: Ms. Evie Scott  has a past medical history of Acute encephalopathy, Brain tumor ([address] Cancer ([address] High cholesterol, Hypertension, Incontinence of urine, Infection of  (ventriculoperitoneal) shunt ([address] Infection of  (ventriculoperitoneal) shunt ([address] Malfunction of ventriculoperitoneal shunt ([address] Memory loss due to medical condition, Morbid obesity ([address] Nausea & vomiting, Obstructive hydrocephalus, Seizures ([address] and UTI (lower urinary tract infection). Ms. Marcos Smith  has a past surgical history that includes pr neurological procedure unlisted; SHUNT VENTRICULAR-PERITONEAL REVISION (N/A, 11/8/2018); COLONOSCOPY  BMI 37.70 (N/A, 5/22/2014); ENDOSCOPIC POLYPECTOMY (N/A, 5/22/2014); SHUNT VENTRICULAR-PERITONEAL REVISION (N/A, 12/24/2013); SHUNT VENTRICULAR-PERITONEAL REVISION (Left, 11/21/2013); HARDWARE REMOVAL (N/A, 9/24/2013); and SHUNT VENTRICULAR-PERITONEAL REVISION (N/A, 6/7/2013). Social History/Living Environment:  
Home Environment: Private residence # Steps to Enter: 1 One/Two Story Residence: One story Living Alone: No 
Support Systems: Family member(s) Patient Expects to be Discharged to[de-identified] Rehabilitation facility Current DME Used/Available at Home: Shower chair, Jennifer Gross Tub or Shower Type: Tub/Shower combination Prior Level of Function/Work/Activity: 
Reports independence with ADLs, ambulates without utilizing an assistive device and endorses driving Dominant Side:  
      RIGHT Number of Personal Factors/Comorbidities that affect the Plan of Care: Extensive review of physical, cognitive, and psychosocial performance (3+):  HIGH COMPLEXITY ASSESSMENT OF OCCUPATIONAL PERFORMANCE[de-identified]  
Activities of Daily Living:  
Basic ADLs (From Assessment) Complex ADLs (From Assessment) Feeding: Maximum assistance Oral Facial Hygiene/Grooming: Maximum assistance Bathing: Maximum assistance Upper Body Dressing: Maximum assistance Lower Body Dressing: Total assistance Toileting:  Total assistance(adult brief, purewick in place) Instrumental ADL 
 Meal Preparation: Total assistance Homemaking: Total assistance Medication Management: Total assistance Financial Management: Total assistance Grooming/Bathing/Dressing Activities of Daily Living Cognitive Retraining Safety/Judgement: Decreased insight into deficits; Fall prevention;Decreased awareness of environment;Decreased awareness of need for assistance;Decreased awareness of need for safety Functional Transfers Toilet Transfer : Total assistance Tub Transfer: Total assistance Shower Transfer: Total assistance Bed/Mat Mobility Rolling: Minimum assistance Supine to Sit: Moderate assistance Sit to Supine: Moderate assistance Scooting: Moderate assistance Most Recent Physical Functioning:  
Gross Assessment: 
AROM: Generally decreased, functional 
Strength: Generally decreased, functional 
Coordination: Generally decreased, functional 
Sensation: Intact Posture: 
Posture (WDL): Exceptions to [facility] Posture Assessment: Forward head, Rounded shoulders Balance: 
Sitting: Impaired Sitting - Static: Fair (occasional) Sitting - Dynamic: Fair (occasional) Bed Mobility: 
Rolling: Minimum assistance Supine to Sit: Moderate assistance Sit to Supine: Moderate assistance Scooting: Moderate assistance Wheelchair Mobility: 
  
Transfers: 
   
 
    
 
Patient Vitals for the past 6 hrs: 
 BP BP Patient Position SpO2 Pulse 11/23/18 1154 147/83 At rest 98 % 82 Mental Status Neurologic State: Alert Orientation Level: Oriented to person, Disoriented to place, Disoriented to situation, Disoriented to time Cognition: Follows commands Perception: Cues to attend to left side of body, Cues to maintain midline in sitting, Cues to attend left visual field, Tactile, Verbal, Visual 
Perseveration: No perseveration noted Safety/Judgement: Decreased insight into deficits, Fall prevention, Decreased awareness of environment, Decreased awareness of need for assistance, Decreased awareness of need for safety Physical Skills Involved: 1. Range of Motion 2. Balance 3. Strength 4. Activity Tolerance 5. Sensation 6. Vision 7. Pain (acute) 8. Edema 9. Skin Integrity Cognitive Skills Affected (resulting in the inability to perform in a timely and safe manner): 1. Perception 2. Executive Function 3. Immediate Memory 4. Short Term Recall 5. Sustained Attention 6. Divided Attention 7. Comprehension 8. Expression Psychosocial Skills Affected: 1. Habits/Routines 2. Environmental Adaptation 3. Social Interaction 4. Emotional Regulation 5. Self-Awareness 6. Awareness of Others 7. Social Roles Number of elements that affect the Plan of Care: 5+:  HIGH COMPLEXITY CLINICAL DECISION MAKIN[address] [place] [address] AM-PAC 6 Clicks Daily Activity Inpatient Short Form How much help from another person does the patient currently need. .. Total A Lot A Little None 1. Putting on and taking off regular lower body clothing? [x] 1   [] 2   [] 3   [] 4  
2. Bathing (including washing, rinsing, drying)? [x] 1   [] 2   [] 3   [] 4  
3. Toileting, which includes using toilet, bedpan or urinal?   [x] 1   [] 2   [] 3   [] 4  
4. Putting on and taking off regular upper body clothing? [x] 1   [] 2   [] 3   [] 4  
5. Taking care of personal grooming such as brushing teeth? [] 1   [x] 2   [] 3   [] 4  
6. Eating meals? [] 1   [x] 2   [] 3   [] 4  
© , Trustees of [address] [place] [address], under license to [employer]. All rights reserved Score:  Initial: 8, CM, completed 2018 Most Recent: X (Date: -- ) Interpretation of Tool:  Represents activities that are increasingly more difficult (i.e. Bed mobility, Transfers, Gait). Score 24 23 22-20 19-15 14-10 9-7 6 Modifier CH CI CJ CK CL CM CN   
 
? Self Care:  
  - CURRENT STATUS: CM - 80%-99% impaired, limited or restricted  - GOAL STATUS: CJ - 20%-39% impaired, limited or restricted  - D/C STATUS:  ---------------To be determined--------------- Payor: CARE IMPROVEMENT PLUS [place]PR[place] / Product Type: Managed Care Medicare /   
 
Medical Necessity:    
· Patient demonstrates fair rehab potential due to higher previous functional level. Reason for Services/Other Comments: 
· Patient continues to require skilled intervention due to s/p above and decreased ADLs and mobility. Use of outcome tool(s) and clinical judgement create a POC that gives a: MODERATE COMPLEXITY  
 
 
 
TREATMENT:  
(In addition to Assessment/Re-Assessment sessions the following treatments were rendered) Pre-treatment Symptoms/Complaints: \"My glasses are at home. \" 
Pain: Initial:  
Pain Intensity 1: 0  Post Session:  unchanged Self Care: (22 mins): Procedure(s) (per grid) utilized to improve and/or restore self-care/home management as related to dressing, bathing and toileting. Required maximal visual, verbal, manual, tactile and   cueing to facilitate activities of daily living skills and compensatory activities. Evaluation: 10 mins Braces/Orthotics/Lines/Etc:  
· IV · PureWick · O2 Device: Room air Treatment/Session Assessment:   
· Response to Treatment:  Quickly fatigued with sitting EOB · Interdisciplinary Collaboration:  
o Physical Therapist 
o Occupational Therapist 
o Registered Nurse · After treatment position/precautions:  
o Supine in bed 
o Bed alarm/tab alert on 
o Bed/Chair-wheels locked 
o Bed in low position 
o Call light within reach 
o RN notified 
o Side rails x 3  
· Compliance with Program/Exercises: Will assess as treatment progresses. · Recommendations/Intent for next treatment session: \"Next visit will focus on advancements to more challenging activities and reduction in assistance provided\". Total Treatment Duration:  32 mins OT Patient Time In/Time Out Time In: 0663 Time Out: 2058 Jeanette Taylor, OT    Jeanette Taylor, MS, OTR/L

## 2018-11-23 NOTE — PROGRESS NOTES
NEUROSURGERY PROGRESS NOTE:  
Admit Date: 11/8/2018 Subjective: No acute overnight events. Objective: 
Visit Vitals /73 Pulse 85 Temp 97.7 °F (36.5 °C) Resp 16 Ht 5' 6\" (1.676 m) Wt 238 lb 15.7 oz (108.4 kg) SpO2 98% Breastfeeding? No  
BMI 38.57 kg/m² General: No acute distress Awake, alert, and oriented to person, Hospital, oriented to year, not president Eyes open spontaneously PERRL, EOMI with slight exotropia of the left eye at baseline Slight left frontalis weakness baseline Patient with 5/5 strength in the bilateral upper and bilateral lower extremities Incisions: Incisions clean, dry, and intact with sutures on the skin and staples on posterior auricular incision, right abdominal incision clean, dry, and intact with dermabond on the skin. Assessment and Plan:  
[place] 59 y.o. female who is now 3 Days Post-Op from placement of a right ventriculoperitoneal shunt placement and removal of her left external ventricular drain. She will need placement likely in a SNF or a rehab 
- Continue routine care 
- PT/OT and ST when able - Shunt series reviewed - Please call with questions or concerns Soniya Reyes. Armando Alvarez, [facility]

## 2018-11-23 NOTE — PROGRESS NOTES
[facility] OUTREACH NURSE PROGRESS REPORT SUBJECTIVE: Called to assess patient secondary to transfer out of ICU. MEWS Score: 1 (11/23/18 0751) Vitals:  
 11/22/18 1856 11/22/18 2238 11/23/18 0400 11/23/18 9725 BP: 155/85 129/70 138/73 158/88 Pulse: 98 93 85 83 Resp: 16 16 16 18 Temp: 97.8 °F (36.6 °C) 97.9 °F (36.6 °C) 97.7 °F (36.5 °C) 97.8 °F (36.6 °C) SpO2: 99% 93% 98% 95% Weight:      
Height: OBJECTIVE: On arrival to room, I found patient to be resting comfortably in bed. ASSESSMENT:  Patient is asleep at this time. VSS on room air. PLAN: Will continue to round per outreach protocol.

## 2018-11-23 NOTE — PROGRESS NOTES
Problem: Falls - Risk of 
Goal: *Absence of Falls Document Elder Alvarado Fall Risk and appropriate interventions in the flowsheet. Outcome: Progressing Towards Goal 
Fall Risk Interventions: 
Mobility Interventions: Bed/chair exit alarm, Communicate number of staff needed for ambulation/transfer Mentation Interventions: Adequate sleep, hydration, pain control, Bed/chair exit alarm, Door open when patient unattended, Reorient patient, Update white board Medication Interventions: Bed/chair exit alarm Elimination Interventions: Bed/chair exit alarm, Call light in reach, Patient to call for help with toileting needs Problem: Pressure Injury - Risk of 
Goal: *Prevention of pressure injury Document Waylon Scale and appropriate interventions in the flowsheet. Outcome: Progressing Towards Goal 
Pressure Injury Interventions: 
Sensory Interventions: Assess changes in LOC, Assess need for specialty bed, Avoid rigorous massage over bony prominences, Keep linens dry and wrinkle-free, Minimize linen layers Moisture Interventions: Absorbent underpads, Apply protective barrier, creams and emollients, Minimize layers, Internal/External urinary devices Activity Interventions: Increase time out of bed, Pressure redistribution bed/mattress(bed type), PT/OT evaluation Mobility Interventions: HOB 30 degrees or less, Pressure redistribution bed/mattress (bed type), PT/OT evaluation Nutrition Interventions: Document food/fluid/supplement intake Friction and Shear Interventions: HOB 30 degrees or less, Minimize layers

## 2018-11-23 NOTE — PROGRESS NOTES
OUTREACH NURSE PROGRESS REPORT SUBJECTIVE: In to assess patient secondary to transfer from unit. Jerry Huizar was seen and focused assessment complete. MEWS Score: 1 (11/22/18 1856) Vitals:  
 11/22/18 0708 11/22/18 1116 11/22/18 1449 11/22/18 1856 BP: 137/77 141/80 133/57 155/85 Pulse: 86 95 99 98 Resp: 16 16 16 16 Temp: 97.8 °F (36.6 °C) 97.8 °F (36.6 °C) 97.3 °F (36.3 °C) 97.8 °F (36.6 °C) SpO2: 97% 98% 100% 99% Weight:      
Height: OBJECTIVE: On arrival to room, I found patient to be sitting up in bed with family at bedside. ASSESSMENT:  
Pt A&O x3- disoriented to year. Sat 98% on RA and HR 87. Family concerned about pts left eye. L eye noted to be red and pt had difficulty opening it all the way. Informed family that pt is receiving eye gtts QID. Pt denies any pain at this time. Breath sounds CTA bilaterally. PLAN:  Will continue to follow along per outreach protocol.

## 2018-11-23 NOTE — PROGRESS NOTES
Problem: Mobility Impaired (Adult and Pediatric) Goal: *Acute Goals and Plan of Care (Insert Text) ST. Patient will perform bed mobility with MINIMAL ASSISTANCE within 3 days. 2. Patient will transfer bed to chair with MODERATE ASSISTANCE within 3 days. 3. Patient will demonstrate GOOD DYNAMIC SITTING balance within 3 day(s). 4. Patient will tolerate 15+ minutes of therapeutic activity/exercise and/or neuromuscular re-education while maintaining stable vitals to improve functional strength and activity tolerance within 3 days. LT. Patient will perform bed mobility with CONTACT GUARD ASSISTANCE within 7 days. 2. Patient will transfer bed to chair with MINIMAL ASSISTANCE ASSISTANCE within 7 days. 3. Patient will demonstrate FAIR DYNAMIC STANDING balance within 7 day(s). 4. Patient will ambulate 150+ using least restrictive assistive device and MINIMAL ASSISTANCE within 7 days. 5. Patient will tolerate 25+ minutes of therapeutic activity/exercise and/or neuromuscular re-education while maintaining stable vitals to improve functional strength and activity tolerance within 7 days. PHYSICAL THERAPY: Initial Assessment, PM 2018INPATIENT: Hospital Day: 16 Payor: CARE IMPROVEMENT PLUS [place]ARE [place] / Product Type: Managed Care Medicare /  
  
NAME/AGE/GENDER: Jerry Huizar is a 59 y.o. female PRIMARY DIAGNOSIS: Altered mental status Altered mental status Infection of  (ventriculoperitoneal) shunt (HCC) Infection of  (ventriculoperitoneal) shunt ([facility] [address] Procedure(s) (LRB): VENTRICULAR-PERITONEAL SHUNT WITH GENERAL SURGERY FOR DISTAL ABDOMINAL PLACEMENT/ STEALTH  (N/A) 3 Days Post-Op ICD-10: Treatment Diagnosis:  
 · Generalized Muscle Weakness (M62.81) · Difficulty in walking, Not elsewhere classified (R26.2) Precaution/Allergies: 
Patient has no known allergies.   
  
ASSESSMENT:  
Ms. Betzaida Bosch is a 59year old female admitted with AMS and s/p  shunt revision. Patient seen this PM for initial physical therapy evaluation: presents s/p OT assessment, oriented to person, disoriented to time/date/place/situation and endorses 0/10 pain. Patient lives with family in a single story residence with 0 steps to enter. At baseline, patient endorses independence with ADLs, ambulates without utilizing an assistive device and endorses driving. Today, functional weakness appreciated in B LEs, B LE ROM WNL, and sensation is intact to light touch B LE. Patient required minimal assistance with rolling, moderate assistance with supine to sitting and sit to supine transfers and fair dynamic sitting balance. Unable to attempt standing. Ms. Andra Pérez presents with decreased functional mobility and balance/gait status from baseline. Recommend continued skilled PT services to address stated deficits. Will follow and progress toward stated goals during acute stay. This section established at most recent assessment PROBLEM LIST (Impairments causing functional limitations): 1. Decreased Strength 2. Decreased ADL/Functional Activities 3. Decreased Transfer Abilities 4. Decreased Ambulation Ability/Technique 5. Decreased Balance 6. Decreased Activity Tolerance 7. Increased Fatigue 8. Decreased Flexibility/Joint Mobility 9. Decreased Knowledge of Precautions INTERVENTIONS PLANNED: (Benefits and precautions of physical therapy have been discussed with the patient.) 1. Balance Exercise 2. Bed Mobility 3. Family Education 4. Gait Training 5. Group Therapy 6. Home Exercise Program (HEP) 7. Neuromuscular Re-education/Strengthening 8. Range of Motion (ROM) 9. Therapeutic Activites 10. Therapeutic Exercise/Strengthening 11. Transfer Training TREATMENT PLAN: Frequency/Duration: 3 times a week for duration of hospital stay Rehabilitation Potential For Stated Goals: Good RECOMMENDED REHABILITATION/EQUIPMENT: (at time of discharge pending progress): Due to the probability of continued deficits (see above) this patient will likely need continued skilled physical therapy after discharge. Equipment: ? TBD pending patient progress HISTORY:  
History of Present Injury/Illness (Reason for Referral): 
Weakness; Difficulty in walking Past Medical History/Comorbidities: Ms. Rober Jacobo  has a past medical history of Acute encephalopathy, Brain tumor ([address] Cancer ([address] High cholesterol, Hypertension, Incontinence of urine, Infection of  (ventriculoperitoneal) shunt ([address] Infection of  (ventriculoperitoneal) shunt ([address] Malfunction of ventriculoperitoneal shunt ([address] Memory loss due to medical condition, Morbid obesity ([address] Nausea & vomiting, Obstructive hydrocephalus, Seizures ([address] and UTI (lower urinary tract infection). Ms. Rober Jacobo  has a past surgical history that includes pr neurological procedure unlisted; SHUNT VENTRICULAR-PERITONEAL REVISION (N/A, 11/8/2018); COLONOSCOPY  BMI 37.70 (N/A, 5/22/2014); ENDOSCOPIC POLYPECTOMY (N/A, 5/22/2014); SHUNT VENTRICULAR-PERITONEAL REVISION (N/A, 12/24/2013); SHUNT VENTRICULAR-PERITONEAL REVISION (Left, 11/21/2013); HARDWARE REMOVAL (N/A, 9/24/2013); and SHUNT VENTRICULAR-PERITONEAL REVISION (N/A, 6/7/2013). Social History/Living Environment:  
Home Environment: Private residence # Steps to Enter: 1 One/Two Story Residence: One story Living Alone: No 
Support Systems: Family member(s) Patient Expects to be Discharged to[de-identified] Rehabilitation facility Current DME Used/Available at Home: Shower chair, Lidya Crockett Prior Level of Function/Work/Activity: 
Patient lives with family in a single story residence with 0 steps to enter. At baseline, patient endorses independence with ADLs, ambulates without utilizing an assistive device and endorses driving. Number of Personal Factors/Comorbidities that affect the Plan of Care: 1-2: MODERATE COMPLEXITY EXAMINATION:  
 Most Recent Physical Functioning:  
Gross Assessment: 
AROM: Generally decreased, functional 
Strength: Generally decreased, functional 
Sensation: Intact Posture: 
Posture (WDL): Exceptions to [facility] Posture Assessment: Forward head, Rounded shoulders Balance: 
Sitting: Impaired Sitting - Static: Fair (occasional) Sitting - Dynamic: Fair (occasional) Bed Mobility: 
Rolling: Minimum assistance Supine to Sit: Moderate assistance Sit to Supine: Moderate assistance Wheelchair Mobility: 
  
Transfers: 
  
Gait: 
  
   
  
Body Structures Involved: 1. Nerves 2. Muscles Body Functions Affected: 1. Neuromusculoskeletal 
2. Movement Related Activities and Participation Affected: 1. Mobility 2. Self Care Number of elements that affect the Plan of Care: 4+: HIGH COMPLEXITY CLINICAL PRESENTATION:  
Presentation: Evolving clinical presentation with changing clinical characteristics: MODERATE COMPLEXITY CLINICAL DECISION MAKIN[address] [place] [id number] AM-PAC 6 Clicks Basic Mobility Inpatient Short Form How much difficulty does the patient currently have. .. Unable A Lot A Little None 1. Turning over in bed (including adjusting bedclothes, sheets and blankets)? [] 1   [] 2   [x] 3   [] 4  
2. Sitting down on and standing up from a chair with arms ( e.g., wheelchair, bedside commode, etc.)   [] 1   [x] 2   [] 3   [] 4  
3. Moving from lying on back to sitting on the side of the bed? [] 1   [] 2   [x] 3   [] 4 How much help from another person does the patient currently need. .. Total A Lot A Little None 4. Moving to and from a bed to a chair (including a wheelchair)? [] 1   [x] 2   [] 3   [] 4  
5. Need to walk in hospital room? [] 1   [x] 2   [] 3   [] 4  
6. Climbing 3-5 steps with a railing? [] 1   [x] 2   [] 3   [] 4  
© 2007, Trustees of [address] [place] [id number], under license to [employer]. All rights reserved Score:  Initial: 14 Most Recent: 14 (Date: 18 ) Interpretation of Tool:  Represents activities that are increasingly more difficult (i.e. Bed mobility, Transfers, Gait). Score 24 23 22-20 19-15 14-10 9-7 6 Modifier CH CI CJ CK CL CM CN   
 
? Mobility - Walking and Moving Around:  
  - CURRENT STATUS: CL - 60%-79% impaired, limited or restricted  - GOAL STATUS: CK - 40%-59% impaired, limited or restricted  - D/C STATUS:  ---------------To be determined--------------- Payor: CARE IMPROVEMENT PLUS [place] CARE IMPROVEMENT [place] / Product Type: Managed Care Medicare /   
 
Medical Necessity:    
· Patient demonstrates good rehab potential due to higher previous functional level. Reason for Services/Other Comments: 
· Patient continues to require skilled intervention due to decreased functional mobility and balance/gait status from baseline. Lula Ortiz Use of outcome tool(s) and clinical judgement create a POC that gives a: Questionable prediction of patient's progress: MODERATE COMPLEXITY  
  
 
 
 
TREATMENT:  
(In addition to Assessment/Re-Assessment sessions the following treatments were rendered) Pre-treatment Symptoms/Complaints:   
Pain: Initial:  
Pain Intensity 1: 0  Post Session:  0/10 Assessment/Reassessment only, no treatment provided today Braces/Orthotics/Lines/Etc:  
· IV 
· O2 Device: Room air Treatment/Session Assessment:   
· Response to Treatment:  See above. · Interdisciplinary Collaboration:  
o Physical Therapist 
o Registered Nurse · After treatment position/precautions:  
o Supine in bed 
o Bed alarm/tab alert on 
o Bed/Chair-wheels locked 
o Bed in low position 
o Call light within reach 
o RN notified 
o Nurse at bedside 
o Side rails x 3 
o PCT to turn bed alarm back on if indicated · Compliance with Program/Exercises: Will assess as treatment progresses · Recommendations/Intent for next treatment session:   \"Next visit will focus on advancements to more challenging activities and reduction in assistance provided\". Total Treatment Duration: PT Patient Time In/Time Out Time In: 9767 Time Out: 5783 Ken Armas DPT

## 2018-11-23 NOTE — PROGRESS NOTES
Hospitalist Progress Note Patient: Danial Jaime MRN: [id number]  SSN: [id number] YOB: 1955  Age: 59 y.o. Sex: female Admit Date: 11/8/2018 LOS: 15 days Subjective:  
 
59year old AAF with a PMH of HTN and brain tumor s/p  shunt placement presented to the ER on 11/8 with acute encephalopathy. She was found to have obstructive hydrocephalus and possible  shunt infection. She went for  shunt removal on 11/8. She has been on broad spectrum antibiotics since that time. 11/23 - Pt seen at bedside Having breakfast with minimal difficulty Pt denies headache, nausea/vomiting, fever, blurry vision, SOB, cough. No new complaints Review of systems negative except stated above. Objective:  
 
Visit Vitals /73 Pulse 85 Temp 97.7 °F (36.5 °C) Resp 16 Ht 5' 6\" (1.676 m) Wt 108.4 kg (238 lb 15.7 oz) SpO2 98% Breastfeeding? No  
BMI 38.57 kg/m² Oxygen Therapy O2 Sat (%): 98 % (11/23/18 0400) Pulse via Oximetry: 94 beats per minute (11/21/18 1532) O2 Device: Room air (11/21/18 6947) O2 Flow Rate (L/min): 2 l/min (11/10/18 1901) Intake and Output:  
 
Intake/Output Summary (Last 24 hours) at 11/23/2018 1902 Last data filed at 11/23/2018 0403 Gross per 24 hour Intake 1080 ml Output 650 ml Net 430 ml Physical Exam:  
GENERAL: alert, cooperative, no distress, appears stated age EYE: conjunctivae/corneas clear. PERRL. THROAT & NECK: normal and no erythema or exudates noted. LUNG: clear to auscultation bilaterally HEART: regular rate and rhythm, S1S2, no murmur, no JVD ABDOMEN: soft, non-tender, non-distended. Bowel sounds normal.  
EXTREMITIES:  No edema, 2+ pedal/radial pulses bilaterally SKIN: no rash or abnormalities NEUROLOGIC: Cranial nerves 2-12 grossly intact. Psych:     AOx4, mood and affect appropriate Lab/Data Review: No results found for this or any previous visit (from the past 24 hour(s)). Imaging: Xr Shunt Series Result Date: 11/21/2018 IMPRESSION: Shunt tubing appears intact. Ct Head Wo Cont Result Date: 11/20/2018 IMPRESSION:  1. No acute intracranial process evident by noncontrast CT study of the head. Only stable findings are seen as described above. Ct Head Wo Cont Result Date: 11/18/2018 IMPRESSION: Removal of the right frontal ventriculostomy catheter, decreased hydrocephalus. Ct Head Wo Cont Result Date: 11/8/2018 IMPRESSION: Increased size of the lateral ventricles since prior exam as detailed above. [address] Result Date: 11/10/2018 IMPRESSION:  1. No evidence of obstruction or other acute abnormality. 2.  Asymmetric thinning and hyperechogenicity of the right renal parenchyma compared with the left suggests the possibility of renal artery stenosis in this patient with hypertension. 3.  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 No results found for this visit on 11/08/18. Cultures: All Micro Results Procedure Component Value Units Date/Time CULTURE, CSF Elea Davenport [[id number]] Collected:  11/13/18 1516 Order Status:  Completed Specimen:  Cerebrospinal Fluid Updated:  11/18/18 3833 Special Requests: NO SPECIAL REQUESTS     
  GRAM STAIN NO WBCS SEEN     
   NO DEFINITE ORGANISM SEEN Culture result: NO GROWTH 5 DAYS     
 CULTURE, CSF Srinivas Mellow STAIN [[id number]] Collected:  11/08/18 0912 Order Status:  Completed Specimen:  Cerebrospinal Fluid Updated:  11/13/18 0725 Special Requests: NO SPECIAL REQUESTS     
  GRAM STAIN 0 TO 3 WBCS/OIF  
   NO DEFINITE ORGANISM SEEN Culture result: NO GROWTH 5 DAYS     
 CULTURE, BLOOD [[id number]] Collected:  11/08/18 0836 Order Status:  Completed Specimen:  Blood Updated:  11/13/18 0719 Special Requests: --     
  LEFT 
FOREARM Culture result: NO GROWTH 5 DAYS     
 CULTURE, BLOOD [[id number]] Collected:  11/08/18 6209 Order Status:  Completed Specimen:  Blood Updated:  11/13/18 0719 Special Requests: --     
  RIGHT 
FOREARM Culture result: NO GROWTH 5 DAYS Assessment/Plan:  
 
Principal Problem: 
  Infection of  (ventriculoperitoneal) shunt ([address] (12/13/2013) - S/P removal on 11/14 and replacement of new  shunt on 11/20 
- Doing well post-op Active Problems: 
  Malfunction of ventriculoperitoneal shunt ([address] (6/9/2013) - S/P removal on 11/14 and replacement of new  shunt on 11/20 
- Doing well post-op - Appreciate Neurosurgery's input and assistance Acute encephalopathy (12/12/2013) - Had some intermittent confusion this AM 
- Due to #1 
- Now A&Ox3 so will monitor Hypertension (6/9/2013) - blood pressure is optimally controlled - Continue Amlodipine 10mg 
- Continue Hydralazine 50mg TID MORIS (acute kidney injury) ([address] (11/8/2018) - Resolved Conjunctivitis (11/18/2018) - improved, no lacrimation or itching 
- continue Opti-clear drops - Monitor for changes Brain tumor ([address] (6/9/2013) - S/P  shunt placement Dispo - will need PT/OT eval prior to discharge. Will benefit from STR 
 
DIET MECHANICAL SOFT 
 
DVT Prophylaxis: SCDs PPD placed Signed By: Evgeny Reyez MD   
 November 23, 2018

## 2018-11-23 NOTE — PROGRESS NOTES
Problem: Falls - Risk of 
Goal: *Absence of Falls Document Lucy Deal Fall Risk and appropriate interventions in the flowsheet. Outcome: Progressing Towards Goal 
Fall Risk Interventions: 
Mobility Interventions: Bed/chair exit alarm, Communicate number of staff needed for ambulation/transfer, OT consult for ADLs, Patient to call before getting OOB, PT Consult for mobility concerns, PT Consult for assist device competence Mentation Interventions: Adequate sleep, hydration, pain control, Bed/chair exit alarm, Evaluate medications/consider consulting pharmacy, Eyeglasses and hearing aids, Familiar objects from home, Family/sitter at bedside, More frequent rounding, Room close to nurse's station, Toileting rounds, Update white board Medication Interventions: Bed/chair exit alarm, Evaluate medications/consider consulting pharmacy, Patient to call before getting OOB, Teach patient to arise slowly Elimination Interventions: Bed/chair exit alarm, Call light in reach, Patient to call for help with toileting needs, Toileting schedule/hourly rounds

## 2018-11-23 NOTE — PROGRESS NOTES
Date of Outreach Update: 
Alvino Parry was seen and assessed. MEWS Score: 1 (11/22/18 2238) Vitals:  
 11/22/18 1116 11/22/18 1449 11/22/18 1856 11/22/18 2238 BP: 141/80 133/57 155/85 129/70 Pulse: 95 99 98 93 Resp: 16 16 16 16 Temp: 97.8 °F (36.6 °C) 97.3 °F (36.3 °C) 97.8 °F (36.6 °C) 97.9 °F (36.6 °C) SpO2: 98% 100% 99% 93% Weight:      
Height:      
  
 
Previous Outreach assessment has been reviewed. There have been no significant clinical changes since the completion of the last dated Outreach assessment. Will continue to follow up per outreach protocol. Signed By:   Clair Santoyo RN   November 23, 2018 3:08 AM

## 2018-11-23 NOTE — PROGRESS NOTES
PT/OT evals still pending at this time. List of Care Improvement Plus approved STR given to patient to review and choose. Left message with  Silvino Jewell as well regarding STR list and need to choose facilities. CM will continue to follow.

## 2018-11-24 LAB
MM INDURATION POC: 0 MM (ref 0–5)
PPD POC: 0 NEGATIVE

## 2018-11-24 PROCEDURE — 65270000029 HC RM PRIVATE

## 2018-11-24 PROCEDURE — 74011250637 HC RX REV CODE- 250/637: Performed by: FAMILY MEDICINE

## 2018-11-24 PROCEDURE — 74011250637 HC RX REV CODE- 250/637: Performed by: INTERNAL MEDICINE

## 2018-11-24 RX ADMIN — ACETAMINOPHEN 650 MG: 325 TABLET, FILM COATED ORAL at 17:07

## 2018-11-24 RX ADMIN — MEMANTINE HYDROCHLORIDE 5 MG: 5 TABLET ORAL at 09:04

## 2018-11-24 RX ADMIN — TETRAHYDROZOLINE HYDROCHLORIDE 2 DROP: 0.05 SOLUTION/ DROPS OPHTHALMIC at 09:05

## 2018-11-24 RX ADMIN — TETRAHYDROZOLINE HYDROCHLORIDE 2 DROP: 0.05 SOLUTION/ DROPS OPHTHALMIC at 13:00

## 2018-11-24 RX ADMIN — FOLIC ACID 0.5 MG: 1 TABLET ORAL at 09:03

## 2018-11-24 RX ADMIN — HYDRALAZINE HYDROCHLORIDE 50 MG: 50 TABLET, FILM COATED ORAL at 17:07

## 2018-11-24 RX ADMIN — HYDRALAZINE HYDROCHLORIDE 50 MG: 50 TABLET, FILM COATED ORAL at 09:04

## 2018-11-24 RX ADMIN — LORATADINE 10 MG: 10 TABLET ORAL at 09:03

## 2018-11-24 RX ADMIN — Medication 5 ML: at 22:28

## 2018-11-24 RX ADMIN — HYDRALAZINE HYDROCHLORIDE 50 MG: 50 TABLET, FILM COATED ORAL at 22:25

## 2018-11-24 RX ADMIN — POTASSIUM CHLORIDE 20 MEQ: 20 TABLET, EXTENDED RELEASE ORAL at 17:07

## 2018-11-24 RX ADMIN — CALCIUM CARBONATE 500 MG (1,250 MG)-VITAMIN D3 200 UNIT TABLET 1 TABLET: at 09:04

## 2018-11-24 RX ADMIN — ACETAMINOPHEN 650 MG: 325 TABLET, FILM COATED ORAL at 09:04

## 2018-11-24 RX ADMIN — TETRAHYDROZOLINE HYDROCHLORIDE 2 DROP: 0.05 SOLUTION/ DROPS OPHTHALMIC at 22:28

## 2018-11-24 RX ADMIN — Medication 5 ML: at 17:07

## 2018-11-24 RX ADMIN — AMLODIPINE BESYLATE 10 MG: 10 TABLET ORAL at 09:03

## 2018-11-24 RX ADMIN — DONEPEZIL HYDROCHLORIDE 10 MG: 5 TABLET, FILM COATED ORAL at 22:25

## 2018-11-24 RX ADMIN — MEMANTINE HYDROCHLORIDE 5 MG: 5 TABLET ORAL at 17:07

## 2018-11-24 RX ADMIN — POTASSIUM CHLORIDE 20 MEQ: 20 TABLET, EXTENDED RELEASE ORAL at 09:04

## 2018-11-24 RX ADMIN — ROSUVASTATIN CALCIUM 10 MG: 5 TABLET, FILM COATED ORAL at 22:25

## 2018-11-24 RX ADMIN — TETRAHYDROZOLINE HYDROCHLORIDE 2 DROP: 0.05 SOLUTION/ DROPS OPHTHALMIC at 17:09

## 2018-11-24 NOTE — PROGRESS NOTES
Hospitalist Progress Note   
2018 Admit Date: 2018  6:44 AM  
NAME: Mikaela Ambrosio :  1955 MRN:  [id number] Attending: Karoline Sofia MD 
PCP:  Karli, MD [place] 
 
SUBJECTIVE:  
Patient 59year old AAF with a PMH of HTN and brain tumor s/p  shunt placement presented to the ER on  with acute encephalopathy. She was found to have obstructive hydrocephalus and possible  shunt infection. S/p shunt removal on  and replacement on . She has been on broad spectrum antibiotics since that time.  - seen at bedside this AM. She was feeding herself breakfast but with some moderate difficulty. Review of Systems negative with exception of pertinent positives noted above PHYSICAL EXAM  
 
Visit Vitals /82 (BP 1 Location: Right arm, BP Patient Position: At rest;Sitting) Pulse 91 Temp 98.5 °F (36.9 °C) Resp 19 Ht 5' 6\" (1.676 m) Wt 108.4 kg (238 lb 15.7 oz) SpO2 97% Breastfeeding? No  
BMI 38.57 kg/m² Temp (24hrs), Av.4 °F (36.9 °C), Min:97.9 °F (36.6 °C), Max:98.9 °F (37.2 °C) Oxygen Therapy O2 Sat (%): 97 % (18 1052) Pulse via Oximetry: 87 beats per minute (18 1609) O2 Device: Room air (18 1052) O2 Flow Rate (L/min): 2 l/min (11/10/18 1901) Intake/Output Summary (Last 24 hours) at 2018 1157 Last data filed at 2018 1055 Gross per 24 hour Intake 240 ml Output  Net 240 ml General: Lethargic but conversant Lungs:  CTA Bilaterally. Heart:  Regular rate and rhythm,  No murmur, rub, or gallop Abdomen: Soft, Non distended, Non tender, Positive bowel sounds Extremities: No cyanosis, clubbing or edema Neurologic:  No focal deficits ASSESSMENT Active Hospital Problems Diagnosis Date Noted  Conjunctivitis 2018  MORIS (acute kidney injury) ([address] 2018  Infection of  (ventriculoperitoneal) shunt ([facility] [address] 2013  Acute encephalopathy 2013  Malfunction of ventriculoperitoneal shunt ([facility] [address] 06/09/2013 Class: Acute  Hypertension 06/09/2013 Class: Chronic  Brain tumor ([facility] [address] 06/09/2013 Class: Chronic A/P 
- infection of  shunt - S/p removal on 11/14 and replacement of new  shunt on 11/20. 
 
- acute encephalopathy - due to #1 as above, oriented but lethargic. Monitor. Appreciate neurosx input 
 
- HTN - controlled, continue current - MORIS - resolved - Conjunctivitis - resolved, cont opti-clear drops Dispo - continue pt/ot. Awaiting STR placement. Diet - mech soft. DVT Prophylaxis: scds Signed By: Cuate Andrade, DO November 24, 2018

## 2018-11-24 NOTE — PROGRESS NOTES
NEUROSURGERY PROGRESS NOTE:  
Admit Date: 11/8/2018 Subjective: No acute overnight events. Objective: 
Visit Vitals /81 (BP 1 Location: Left arm, BP Patient Position: At rest) Pulse 94 Temp 98.9 °F (37.2 °C) Resp 19 Ht 5' 6\" (1.676 m) Wt 238 lb 15.7 oz (108.4 kg) SpO2 92% Breastfeeding? No  
BMI 38.57 kg/m² General: No acute distress Awake, alert, and oriented to person, Hospital, oriented to year, not president Eyes open spontaneously PERRL, EOMI with slight exotropia of the left eye at baseline Slight left frontalis weakness baseline Patient with 5/5 strength in the bilateral upper and bilateral lower extremities Incisions: Incisions clean, dry, and intact with sutures on the skin and staples on posterior auricular incision, right abdominal incision clean, dry, and intact with dermabond on the skin. Assessment and Plan:  
Dimple De La Cruz 59 y.o. female who is now 3 Days Post-Op from placement of a right ventriculoperitoneal shunt placement and removal of her left external ventricular drain. She will need placement likely in a SNF or a rehab. Sutures removed from left frontal, left posterior auricular, and right frontal incision and steri-stripes applied. Remaining incisions should have sutures removed in 10 days time. Patient should follow-up one month post-op with me in my clinic.  
- Continue routine care 
- PT/OT and ST when able - Shunt series reviewed - Please call with questions or concerns Jesus Vanessa. [address] [facility]

## 2018-11-24 NOTE — PROGRESS NOTES
Problem: Falls - Risk of 
Goal: *Absence of Falls Document Elder Alvarado Fall Risk and appropriate interventions in the flowsheet. Outcome: Progressing Towards Goal 
Fall Risk Interventions: 
Mobility Interventions: Bed/chair exit alarm, Communicate number of staff needed for ambulation/transfer, OT consult for ADLs, Patient to call before getting OOB, PT Consult for mobility concerns, PT Consult for assist device competence Mentation Interventions: Adequate sleep, hydration, pain control, Bed/chair exit alarm, Door open when patient unattended, More frequent rounding Medication Interventions: Bed/chair exit alarm, Evaluate medications/consider consulting pharmacy, Patient to call before getting OOB Elimination Interventions: Bed/chair exit alarm, Call light in reach, Patient to call for help with toileting needs Problem: Pressure Injury - Risk of 
Goal: *Prevention of pressure injury Document Waylon Scale and appropriate interventions in the flowsheet. Outcome: Progressing Towards Goal 
Pressure Injury Interventions: 
Sensory Interventions: Assess changes in LOC, Assess need for specialty bed, Check visual cues for pain, Discuss PT/OT consult with provider, Keep linens dry and wrinkle-free, Minimize linen layers, Pressure redistribution bed/mattress (bed type) Moisture Interventions: Absorbent underpads, Minimize layers Activity Interventions: Increase time out of bed, Pressure redistribution bed/mattress(bed type), PT/OT evaluation Mobility Interventions: HOB 30 degrees or less, Pressure redistribution bed/mattress (bed type), PT/OT evaluation Nutrition Interventions: Document food/fluid/supplement intake Friction and Shear Interventions: HOB 30 degrees or less, Minimize layers

## 2018-11-25 LAB
ANION GAP SERPL CALC-SCNC: 7 MMOL/L (ref 7–16)
BUN SERPL-MCNC: 14 MG/DL (ref 8–23)
CALCIUM SERPL-MCNC: 8.7 MG/DL (ref 8.3–10.4)
CHLORIDE SERPL-SCNC: 104 MMOL/L (ref 98–107)
CO2 SERPL-SCNC: 27 MMOL/L (ref 21–32)
CREAT SERPL-MCNC: 1.22 MG/DL (ref 0.6–1)
ERYTHROCYTE [DISTWIDTH] IN BLOOD BY AUTOMATED COUNT: 17.5 %
GLUCOSE SERPL-MCNC: 83 MG/DL (ref 65–100)
HCT VFR BLD AUTO: 26.8 % (ref 35.8–46.3)
HGB BLD-MCNC: 8.5 G/DL (ref 11.7–15.4)
MCH RBC QN AUTO: 26.6 PG (ref 26.1–32.9)
MCHC RBC AUTO-ENTMCNC: 31.7 G/DL (ref 31.4–35)
MCV RBC AUTO: 84 FL (ref 79.6–97.8)
MM INDURATION POC: 0 MM (ref 0–5)
NRBC # BLD: 0 K/UL (ref 0–0.2)
PLATELET # BLD AUTO: 356 K/UL (ref 150–450)
PMV BLD AUTO: 11.4 FL (ref 9.4–12.3)
POTASSIUM SERPL-SCNC: 4 MMOL/L (ref 3.5–5.1)
PPD POC: 0 NEGATIVE
RBC # BLD AUTO: 3.19 M/UL (ref 4.05–5.2)
SODIUM SERPL-SCNC: 138 MMOL/L (ref 136–145)
WBC # BLD AUTO: 8 K/UL (ref 4.3–11.1)

## 2018-11-25 PROCEDURE — 80048 BASIC METABOLIC PNL TOTAL CA: CPT

## 2018-11-25 PROCEDURE — 74011250637 HC RX REV CODE- 250/637: Performed by: INTERNAL MEDICINE

## 2018-11-25 PROCEDURE — 85027 COMPLETE CBC AUTOMATED: CPT

## 2018-11-25 PROCEDURE — 65270000029 HC RM PRIVATE

## 2018-11-25 PROCEDURE — 36415 COLL VENOUS BLD VENIPUNCTURE: CPT

## 2018-11-25 PROCEDURE — 74011250637 HC RX REV CODE- 250/637: Performed by: FAMILY MEDICINE

## 2018-11-25 RX ADMIN — HYDRALAZINE HYDROCHLORIDE 50 MG: 50 TABLET, FILM COATED ORAL at 22:15

## 2018-11-25 RX ADMIN — TETRAHYDROZOLINE HYDROCHLORIDE 2 DROP: 0.05 SOLUTION/ DROPS OPHTHALMIC at 08:58

## 2018-11-25 RX ADMIN — CALCIUM CARBONATE 500 MG (1,250 MG)-VITAMIN D3 200 UNIT TABLET 1 TABLET: at 08:57

## 2018-11-25 RX ADMIN — DONEPEZIL HYDROCHLORIDE 10 MG: 5 TABLET, FILM COATED ORAL at 22:15

## 2018-11-25 RX ADMIN — LORATADINE 10 MG: 10 TABLET ORAL at 08:57

## 2018-11-25 RX ADMIN — Medication 5 ML: at 05:23

## 2018-11-25 RX ADMIN — TETRAHYDROZOLINE HYDROCHLORIDE 2 DROP: 0.05 SOLUTION/ DROPS OPHTHALMIC at 18:00

## 2018-11-25 RX ADMIN — FOLIC ACID 0.5 MG: 1 TABLET ORAL at 08:57

## 2018-11-25 RX ADMIN — AMLODIPINE BESYLATE 10 MG: 10 TABLET ORAL at 08:57

## 2018-11-25 RX ADMIN — HYDRALAZINE HYDROCHLORIDE 50 MG: 50 TABLET, FILM COATED ORAL at 17:59

## 2018-11-25 RX ADMIN — ROSUVASTATIN CALCIUM 10 MG: 5 TABLET, FILM COATED ORAL at 22:15

## 2018-11-25 RX ADMIN — POTASSIUM CHLORIDE 20 MEQ: 20 TABLET, EXTENDED RELEASE ORAL at 17:59

## 2018-11-25 RX ADMIN — HYDRALAZINE HYDROCHLORIDE 50 MG: 50 TABLET, FILM COATED ORAL at 08:57

## 2018-11-25 RX ADMIN — Medication 5 ML: at 18:00

## 2018-11-25 RX ADMIN — Medication 5 ML: at 22:15

## 2018-11-25 RX ADMIN — MEMANTINE HYDROCHLORIDE 5 MG: 5 TABLET ORAL at 17:59

## 2018-11-25 RX ADMIN — ACETAMINOPHEN 650 MG: 325 TABLET, FILM COATED ORAL at 08:57

## 2018-11-25 RX ADMIN — TETRAHYDROZOLINE HYDROCHLORIDE 2 DROP: 0.05 SOLUTION/ DROPS OPHTHALMIC at 22:15

## 2018-11-25 RX ADMIN — MEMANTINE HYDROCHLORIDE 5 MG: 5 TABLET ORAL at 08:57

## 2018-11-25 RX ADMIN — POTASSIUM CHLORIDE 20 MEQ: 20 TABLET, EXTENDED RELEASE ORAL at 08:57

## 2018-11-25 NOTE — PROGRESS NOTES
Hospitalist Progress Note   
2018 Admit Date: 2018  6:44 AM  
NAME: Saroj Pond :  1955 MRN:  [id number] Attending: Ramez Joseph MD 
PCP:  Karli, MD [place] 
 
SUBJECTIVE:  
Patient 59year old AAF with a PMH of HTN and brain tumor s/p  shunt placement presented to the ER on  with acute encephalopathy. She was found to have obstructive hydrocephalus and possible  shunt infection. S/p shunt removal on  and replacement on . Completed course of broad spectrum antibiotics. CSF cultures were negative.  - seen at bedside this AM. She was feeding herself breakfast but with some moderate difficulty.  - family at bedside. Updated. Note that patient still with tendency to close left eye. Patient denies pain or blurry vision from left eye. Was treated for conjunctivitis and now without ocular drainage. Review of Systems negative with exception of pertinent positives noted above PHYSICAL EXAM  
 
Visit Vitals /85 (BP 1 Location: Right arm, BP Patient Position: At rest) Pulse 87 Temp 97.9 °F (36.6 °C) Resp 19 Ht 5' 6\" (1.676 m) Wt 108.4 kg (238 lb 15.7 oz) SpO2 98% Breastfeeding? No  
BMI 38.57 kg/m² Temp (24hrs), Av.3 °F (36.8 °C), Min:97.9 °F (36.6 °C), Max:98.8 °F (37.1 °C) Oxygen Therapy O2 Sat (%): 98 % (18 1116) Pulse via Oximetry: 87 beats per minute (18 1609) O2 Device: Room air (18 1116) O2 Flow Rate (L/min): 2 l/min (11/10/18 1901) Intake/Output Summary (Last 24 hours) at 2018 1235 Last data filed at 2018 1122 Gross per 24 hour Intake 240 ml Output 1775 ml Net -1535 ml General: Lethargic but conversant Lungs:  CTA Bilaterally. Heart:  Regular rate and rhythm,  No murmur, rub, or gallop Abdomen: Soft, Non distended, Non tender, Positive bowel sounds Extremities: No cyanosis, clubbing or edema Neurologic:  No focal deficits ASSESSMENT Active Hospital Problems Diagnosis Date Noted  Conjunctivitis 11/18/2018  MORIS (acute kidney injury) ([facility] [address] 11/08/2018  Infection of  (ventriculoperitoneal) shunt ([facility] [address] 12/13/2013  Acute encephalopathy 12/12/2013  [facility] 06/09/2013 Class: Acute  Hypertension 06/09/2013 Class: Chronic  Brain tumor ([facility] [address] 06/09/2013 Class: Chronic A/P 
- malfunction of  shunt - S/p removal on 11/14 and replacement of new  shunt on 11/20. Completed course of antibiotics, csf cultures negative 
 
- acute encephalopathy - due to #1 as above, oriented but lethargic. Monitor. Appreciate neurosx input 
 
- HTN - controlled, continue current - MORIS - resolved - Conjunctivitis - resolved, cont opti-clear drops. Still with preference to close left eye. Will d/w neurosx. Dispo - continue pt/ot. Awaiting STR placement. Diet - mech soft. DVT Prophylaxis: scds Signed By: Halima Aparicio,  November 25, 2018

## 2018-11-25 NOTE — PROGRESS NOTES
Problem: Falls - Risk of 
Goal: *Absence of Falls Document Michael Goodwin Fall Risk and appropriate interventions in the flowsheet. Outcome: Progressing Towards Goal 
Fall Risk Interventions: 
Mobility Interventions: Bed/chair exit alarm, Communicate number of staff needed for ambulation/transfer, OT consult for ADLs, Patient to call before getting OOB, PT Consult for mobility concerns, PT Consult for assist device competence Mentation Interventions: Adequate sleep, hydration, pain control, Bed/chair exit alarm, Door open when patient unattended, Increase mobility, More frequent rounding, Reorient patient, Toileting rounds Medication Interventions: Bed/chair exit alarm, Patient to call before getting OOB, Teach patient to arise slowly Elimination Interventions: Bed/chair exit alarm, Call light in reach, Patient to call for help with toileting needs, Toilet paper/wipes in reach, Toileting schedule/hourly rounds Problem: Pressure Injury - Risk of 
Goal: *Prevention of pressure injury Document Waylon Scale and appropriate interventions in the flowsheet. Outcome: Progressing Towards Goal 
Pressure Injury Interventions: 
Sensory Interventions: Assess changes in LOC, Avoid rigorous massage over bony prominences, Check visual cues for pain, Discuss PT/OT consult with provider, Keep linens dry and wrinkle-free, Maintain/enhance activity level, Minimize linen layers, Pressure redistribution bed/mattress (bed type) Moisture Interventions: Absorbent underpads, Apply protective barrier, creams and emollients, Check for incontinence Q2 hours and as needed, Internal/External urinary devices, Minimize layers Activity Interventions: Increase time out of bed, Pressure redistribution bed/mattress(bed type), PT/OT evaluation Mobility Interventions: HOB 30 degrees or less, Pressure redistribution bed/mattress (bed type), PT/OT evaluation Nutrition Interventions: Document food/fluid/supplement intake Friction and Shear Interventions: Apply protective barrier, creams and emollients, Foam dressings/transparent film/skin sealants, HOB 30 degrees or less, Minimize layers

## 2018-11-25 NOTE — PROGRESS NOTES
Family has reviewed SNF list and request referrals to [facility] for pt rehab placement. Referral sent. They request that we contact spouse Reji Peralta [phone] or sister Shahida Thorne [phone] about bed offers if family is not at bedside. Will follow up.

## 2018-11-26 VITALS
DIASTOLIC BLOOD PRESSURE: 69 MMHG | HEART RATE: 78 BPM | HEIGHT: 66 IN | RESPIRATION RATE: 19 BRPM | SYSTOLIC BLOOD PRESSURE: 118 MMHG | WEIGHT: 238.98 LBS | OXYGEN SATURATION: 97 % | TEMPERATURE: 98.4 F | BODY MASS INDEX: 38.41 KG/M2

## 2018-11-26 PROCEDURE — 90686 IIV4 VACC NO PRSV 0.5 ML IM: CPT | Performed by: FAMILY MEDICINE

## 2018-11-26 PROCEDURE — 74011250637 HC RX REV CODE- 250/637: Performed by: INTERNAL MEDICINE

## 2018-11-26 PROCEDURE — 74011250637 HC RX REV CODE- 250/637: Performed by: FAMILY MEDICINE

## 2018-11-26 PROCEDURE — 77030011256 HC DRSG MEPILEX <16IN NO BORD MOLN -A

## 2018-11-26 PROCEDURE — 77030019605

## 2018-11-26 PROCEDURE — 74011250636 HC RX REV CODE- 250/636: Performed by: FAMILY MEDICINE

## 2018-11-26 PROCEDURE — 90471 IMMUNIZATION ADMIN: CPT

## 2018-11-26 RX ORDER — CIPROFLOXACIN HYDROCHLORIDE 3.5 MG/ML
1 SOLUTION/ DROPS TOPICAL
Status: DISCONTINUED | OUTPATIENT
Start: 2018-11-26 | End: 2018-11-26 | Stop reason: HOSPADM

## 2018-11-26 RX ORDER — LORATADINE 10 MG/1
10 TABLET ORAL DAILY
Qty: 1 TAB | Refills: 0 | Status: SHIPPED
Start: 2018-11-27

## 2018-11-26 RX ORDER — HYDROCODONE BITARTRATE AND ACETAMINOPHEN 5; 325 MG/1; MG/1
1 TABLET ORAL
Qty: 20 TAB | Refills: 0 | Status: SHIPPED | OUTPATIENT
Start: 2018-11-26

## 2018-11-26 RX ORDER — CIPROFLOXACIN HYDROCHLORIDE 3.5 MG/ML
1 SOLUTION/ DROPS TOPICAL
Qty: 1 ML | Refills: 0 | Status: SHIPPED
Start: 2018-11-26

## 2018-11-26 RX ORDER — HYDRALAZINE HYDROCHLORIDE 50 MG/1
50 TABLET, FILM COATED ORAL 3 TIMES DAILY
Qty: 1 TAB | Refills: 0 | Status: SHIPPED
Start: 2018-11-26

## 2018-11-26 RX ORDER — AMLODIPINE BESYLATE 10 MG/1
10 TABLET ORAL DAILY
Qty: 30 TAB | Refills: 0 | Status: SHIPPED | OUTPATIENT
Start: 2018-11-27

## 2018-11-26 RX ADMIN — POTASSIUM CHLORIDE 20 MEQ: 20 TABLET, EXTENDED RELEASE ORAL at 08:26

## 2018-11-26 RX ADMIN — AMLODIPINE BESYLATE 10 MG: 10 TABLET ORAL at 08:27

## 2018-11-26 RX ADMIN — FOLIC ACID 0.5 MG: 1 TABLET ORAL at 08:27

## 2018-11-26 RX ADMIN — HYDRALAZINE HYDROCHLORIDE 50 MG: 50 TABLET, FILM COATED ORAL at 08:27

## 2018-11-26 RX ADMIN — Medication 5 ML: at 06:40

## 2018-11-26 RX ADMIN — MEMANTINE HYDROCHLORIDE 5 MG: 5 TABLET ORAL at 08:27

## 2018-11-26 RX ADMIN — CALCIUM CARBONATE 500 MG (1,250 MG)-VITAMIN D3 200 UNIT TABLET 1 TABLET: at 08:26

## 2018-11-26 RX ADMIN — LORATADINE 10 MG: 10 TABLET ORAL at 08:26

## 2018-11-26 RX ADMIN — INFLUENZA VIRUS VACCINE 0.5 ML: 15; 15; 15; 15 SUSPENSION INTRAMUSCULAR at 10:45

## 2018-11-26 RX ADMIN — ACETAMINOPHEN 650 MG: 325 TABLET, FILM COATED ORAL at 08:27

## 2018-11-26 RX ADMIN — TETRAHYDROZOLINE HYDROCHLORIDE 2 DROP: 0.05 SOLUTION/ DROPS OPHTHALMIC at 08:29

## 2018-11-26 NOTE — PROGRESS NOTES
CM received contact from Susannah Adams in admissions at [facility] that no female beds are available this week. CM met with patient regarding bed offer at [facility]. Patient accepted bed offer. CM then contacted Angela Rivera with [facility] to confirm bed availability for today. Awaiting response regarding bed availability at [facility]. Awaiting medical readiness. CM will continue to follow.

## 2018-11-26 NOTE — DISCHARGE SUMMARY
Hospitalist Discharge Summary     Patient ID:  Elvis Bernal  [id number]  59 y.o.  1955  Admit date: 11/8/2018  6:44 AM  Discharge date and time: 11/26/2018  Attending: Brittany Storey MD  PCP:  Celso Tafoya MD  Treatment Team: Attending Provider: Brittany Storey MD; Consulting Provider: Freda Ware MD; Care Manager: Batool Stein RN; Utilization Review: Alonso Mcdaniel RN; Care Manager: Kristyn Baer RN    Principal Diagnosis Infection of  (ventriculoperitoneal) shunt [facility])   Principal Problem:    Infection of  (ventriculoperitoneal) shunt ([facility] [address] (12/13/2013)    Active Problems:    Brain tumor ([facility] [address] (6/9/2013)      Malfunction of ventriculoperitoneal shunt ([facility] [address] (6/9/2013)      Hypertension (6/9/2013)      Acute encephalopathy (12/12/2013)      MORIS (acute kidney injury) ([facility] [address] (11/8/2018)      Conjunctivitis (11/18/2018)             Hospital Course:  Please refer to the admission H&P for details of presentation. In summary, the patient is 59year old AAF with a PMH of HTN and brain tumor s/p  shunt placement presented to the ER on 11/8 with acute encephalopathy. She was found to have obstructive hydrocephalus and possible  shunt infection. S/p shunt removal on 11/14 and replacement on 11/20. Completed course of broad spectrum antibiotics. CSF cultures were negative. She has some remaining sutures that should be removed by 4 December. Neurosurgery Dr Loli Birch requests a follow-up in 1 month. She has developed a left eye conjunctivitis. Improved with a short course of cipro gtts but worse again last two days. Pt rubbing at eye today, sclera injected. Resumed cipro gtt prior to discharge. No vision changes reported from affected eye.      Significant Diagnostic Studies:   Final result (Exam End: 11/21/2018 12:40) Provider Status: Open   Signed by     Signed Date/Time  Phone Pager   Brittany Kong 11/21/2018 13:16 [phone]    Exam Information     Status Exam Begun  Exam Ended Final [99] 11/21/2018 12:30 11/21/2018 12:40   Study Result     Shunt series     INDICATION: Follow-up shunt placement     Plain films of the skull, chest, and abdomen were obtained. Ventriculoperitoneal shunt is in place from a right parietal approach. The tip  overlies the midline. Shunt tubing is grossly intact. The tip is in the lower  abdomen.     There is minimal atelectasis in both lung bases. The bowel gas pattern is  normal.     IMPRESSION  IMPRESSION: Shunt tubing appears intact. Final result (Exam End: 11/20/2018 09:55) Provider Status: Open   Signed by     Signed Date/Time  Phone Pager   Etelvina Ricks 11/20/2018 10:00 [phone]    Exam Information     Status Exam Begun  Exam Ended    Final [99] 11/20/2018 09:40 11/20/2018 09:55   Study Result     CT HEAD WITHOUT CONTRAST, 11/20/2018      History: Hydrocephalus.      Comparison: CT head without contrast 11/18/2018      Technique:   5 mm axial scans from the skull base to the vertex. All CT scans  performed at this facility use one or all of the following: Automated exposure  control, adjustment of the mA and/or kVp according to patient's size, iterative  reconstruction.      Findings: A shunt catheter once again enters via a left frontal approach. The  tip is stable in position in the area of the suprasellar cistern. No evidence of  acute intracranial hemorrhage is seen. Prior mixed attenuation collection at  the left frontal high convexity is unchanged now seen on image 105 measuring up  to 1.2 cm in width. No evidence for evolving mass effect from this finding is  seen. Stable foci of hyperdensity is seen within this which could represent more  recent blood products. No evidence for evolving hydrocephalus is seen. Rather,  the ventricular system is felt to be stable in size and configuration. No  evidence of midline shift or herniation is seen.   No abnormal edema pattern is  seen in a vascular distribution to suggest large artery infarction. Stable focal  encephalomalacic changes are seen in the right frontal lobe, stable ill-defined  hypodensities are seen in the bilateral basal ganglia, and stable mild to  moderate ill-defined periventricular white matter hypoattenuation is seen. These  changes are felt to be chronic.     Evaluation with bone windows shows no acute osseous changes. A stable occipital  craniotomy defect is seen. The visualized sinuses, middle ears, and mastoid air  cells are well aerated.     IMPRESSION  IMPRESSION:    1. No acute intracranial process evident by noncontrast CT study of the head. Only stable findings are seen as described above.        Final result (Exam End: 11/18/2018 16:18) Provider Status: Open   Signed by     Signed Date/Time  Phone Pager   Kya Salazar 11/18/2018 16:42 [phone]    Exam Information     Status Exam Begun  Exam Ended    Final [99] 11/18/2018 16:10 11/18/2018 16:18   Study Result     CT scan of the brain 11/18/2018  Scanning was performed within 24 hours of arrival to the facility  Comparison: 11/8/2018     Dose reduction techniques used: Automated exposure control, adjustment of the  mAs and/or kVp according to patient size, standardized low-dose protocol, and/or  iterative reconstruction technique.     Indication: Mental status change  Findings: Since the last examination the right frontal ventriculostomy catheter  has been removed. Ventricular volume is decreased to the upper limits of normal.  Bifrontal white matter lucency is again demonstrated. There are no new lesions  or hemorrhage.     IMPRESSION  IMPRESSION: Removal of the right frontal ventriculostomy catheter, decreased  hydrocephalus.          Labs: Results:       Chemistry Recent Labs     11/25/18  0550   GLU 83      K 4.0      CO2 27   BUN 14   CREA 1.22*   CA 8.7   AGAP 7      CBC w/Diff Recent Labs     11/25/18  0550   WBC 8.0   RBC 3.19*   HGB 8.5*   HCT 26.8*         Cardiac Enzymes No results for input(s): CPK, CKND1, PHILL in the last 72 hours. No lab exists for component: CKRMB, TROIP   Coagulation No results for input(s): PTP, INR, APTT in the last 72 hours. No lab exists for component: INREXT    Lipid Panel No results found for: CHOL, CHOLPOCT, CHOLX, CHLST, CHOLV, 921486, HDL, LDL, LDLC, DLDLP, 200892, VLDLC, VLDL, TGLX, TRIGL, TRIGP, TGLPOCT, CHHD, CHHDX   BNP No results for input(s): BNPP in the last 72 hours. Liver Enzymes No results for input(s): TP, ALB, TBIL, AP, SGOT, GPT in the last 72 hours. No lab exists for component: DBIL   Thyroid Studies No results found for: T4, T3U, TSH, TSHEXT         Discharge Exam:  Visit Vitals  /69 (BP 1 Location: Right arm, BP Patient Position: Supine)   Pulse 78   Temp 98.4 °F (36.9 °C)   Resp 19   Ht 5' 6\" (1.676 m)   Wt 108.4 kg (238 lb 15.7 oz)   SpO2 97%   Breastfeeding? No   BMI 38.57 kg/m²     General appearance: alert, cooperative, no distress, appears stated age. Rubbing her left eye. HEENT: left sclera injected, no purulent discharge noted. Scalp incisions clean and dry. Lungs: clear to auscultation bilaterally  Heart: regular rate and rhythm, S1, S2 normal, no murmur, click, rub or gallop  Abdomen: soft, non-tender. Bowel sounds normal. No masses,  no organomegaly  Extremities: no cyanosis or edema  Neurologic: Grossly normal    Disposition:  STR  Discharge Condition: stable  Patient Instructions:   Current Discharge Medication List      START taking these medications    Details   amLODIPine (NORVASC) 10 mg tablet Take 1 Tab by mouth daily. Qty: 30 Tab, Refills: 0      ciprofloxacin HCl (CILOXAN) 0.3 % ophthalmic solution Administer 1 Drop to left eye every two (2) hours (while awake). Qty: 1 mL, Refills: 0      hydrALAZINE (APRESOLINE) 50 mg tablet Take 1 Tab by mouth three (3) times daily. Qty: 1 Tab, Refills: 0      HYDROcodone-acetaminophen (NORCO) 5-325 mg per tablet Take 1 Tab by mouth every four (4) hours as needed.  Max Daily Amount: 6 Tabs. Qty: 20 Tab, Refills: 0    Associated Diagnoses: Brain tumor (HCC)      loratadine (CLARITIN) 10 mg tablet Take 1 Tab by mouth daily. Qty: 1 Tab, Refills: 0         CONTINUE these medications which have NOT CHANGED    Details   donepezil (ARICEPT) 10 mg tablet Take 10 mg by mouth nightly. rosuvastatin (CRESTOR) 10 mg tablet Take 10 mg by mouth nightly. memantine (NAMENDA) 5 mg tablet Take 5 mg by mouth two (2) times a day. folic acid 733 mcg tablet Take 400 mcg by mouth daily. b complex vitamins (B COMPLEX 1) tablet Take 1 Tab by mouth daily. potassium chloride (K-DUR, KLOR-CON) 20 mEq tablet Take 20 mEq by mouth two (2) times a day. calcium-cholecalciferol, d3, (CALCIUM 600 + D) 600-125 mg-unit Tab Take  by mouth daily.          STOP taking these medications       furosemide (LASIX) 20 mg tablet Comments:   Reason for Stopping:         aspirin delayed-release 81 mg tablet Comments:   Reason for Stopping:         lisinopril-hydrochlorothiazide (PRINZIDE, ZESTORETIC) 20-25 mg per tablet Comments:   Reason for Stopping:         simvastatin (ZOCOR) 40 mg tablet Comments:   Reason for Stopping:               Activity: as tolerated  Diet: mechanical soft  Follow-up  ·   PCP 1 week, Neurosx Dr Loli Birch in 1 month  Time spent to discharge patient 35 minutes  Signed:  Eduardo Quintanilla DO  11/26/2018  10:16 AM

## 2018-11-26 NOTE — PROGRESS NOTES
CM followed up with patient's STR referrals on this date by leaving a voice message for admissions staff at [facility] (patient's first choice), and speaking with Camilla Astorga liaison, Hermes Tabares, about referral to [facility] (patient's [facility] choice), where patient has been offered a bed. [facility]. CM will continue working to find STR placement for patient.

## 2018-11-26 NOTE — PROGRESS NOTES
Patient is discharging to [facility] today by [facility] transport at 12:00PM. RN provided with report number. Patient and spouse have both been notified. CM provided spouse with address and telephone number of STR. Care Management Interventions Mode of Transport at Discharge: [facility]) Transition of Care Consult (CM Consult): Discharge Planning, SNF Discharge Durable Medical Equipment: No 
Physical Therapy Consult: Yes Occupational Therapy Consult: Yes Speech Therapy Consult: No 
Current Support Network: Lives with Spouse Plan discussed with Pt/Family/Caregiver: Yes Freedom of Choice Offered: Yes Discharge Location Discharge Placement: Skilled nursing facility

## 2018-12-17 PROBLEM — E66.01 SEVERE OBESITY (HCC): Status: ACTIVE | Noted: 2018-12-17

## 2024-09-13 NOTE — PROGRESS NOTES
Problem: Falls - Risk of 
Goal: *Absence of Falls Document Oliva Reyes Fall Risk and appropriate interventions in the flowsheet. Outcome: Progressing Towards Goal 
Fall Risk Interventions: 
Mobility Interventions: Bed/chair exit alarm, Strengthening exercises (ROM-active/passive) Mentation Interventions: Adequate sleep, hydration, pain control, Bed/chair exit alarm, Family/sitter at bedside, More frequent rounding, Reorient patient, Toileting rounds, Update white board Medication Interventions: Bed/chair exit alarm Elimination Interventions: Bed/chair exit alarm, Toileting schedule/hourly rounds Problem: Pressure Injury - Risk of 
Goal: *Prevention of pressure injury Document Waylon Scale and appropriate interventions in the flowsheet. Outcome: Progressing Towards Goal 
Pressure Injury Interventions: 
Sensory Interventions: Assess changes in LOC, Check visual cues for pain, Float heels, Keep linens dry and wrinkle-free, Maintain/enhance activity level, Minimize linen layers, Monitor skin under medical devices, Pressure redistribution bed/mattress (bed type), Turn and reposition approx. every two hours (pillows and wedges if needed) Moisture Interventions: Absorbent underpads, Apply protective barrier, creams and emollients, Check for incontinence Q2 hours and as needed, Contain wound drainage, Internal/External urinary devices, Maintain skin hydration (lotion/cream), Minimize layers, Moisture barrier, Offer toileting Q_hr Activity Interventions: Pressure redistribution bed/mattress(bed type) Mobility Interventions: Float heels, HOB 30 degrees or less, Pressure redistribution bed/mattress (bed type), Turn and reposition approx. every two hours(pillow and wedges) Nutrition Interventions: Document food/fluid/supplement intake, Offer support with meals,snacks and hydration Friction and Shear Interventions: Feet elevated on foot rest, Foam dressings/transparent film/skin sealants, HOB 30 degrees or less, Lift sheet, Lift team/patient mobility team, Minimize layers Comments: **Unable to complete admission database due to patient condition, no family at bedside. group instruction/individual instruction/skill demonstration/verbal instruction/written material

## (undated) DEVICE — REM POLYHESIVE ADULT PATIENT RETURN ELECTRODE: Brand: VALLEYLAB

## (undated) DEVICE — CONTAINER,SPECIMEN,O.R.STRL,4.5OZ: Brand: MEDLINE

## (undated) DEVICE — Device

## (undated) DEVICE — WAX SURG 2.5GM HEMSTAT BNE BEESWAX PARAFFIN ISO PALMITATE

## (undated) DEVICE — VENTRICULAR DRAINAGE SYSTEM

## (undated) DEVICE — STANDARD HYPODERMIC NEEDLE,POLYPROPYLENE HUB: Brand: MONOJECT

## (undated) DEVICE — SYR 10ML LUER LOK 1/5ML GRAD --

## (undated) DEVICE — Device: Brand: SNAP ON SPHERZ

## (undated) DEVICE — ADHESIVE SKIN CLOSURE 4X22 CM PREMIERPRO EXOFINFUSION DISP

## (undated) DEVICE — 3M™ STERI-DRAPE™ INSTRUMENT POUCH 1018: Brand: STERI-DRAPE™

## (undated) DEVICE — STAPLER EXT SKIN 35 WIDE S STL STPL SQUEEZE HNDL VISISTAT

## (undated) DEVICE — BIPOLAR IRRIGATOR INTEGRATED TUBING AND BIPOLAR CORD SET, DISPOSABLE

## (undated) DEVICE — INTENDED FOR TISSUE SEPARATION, AND OTHER PROCEDURES THAT REQUIRE A SHARP SURGICAL BLADE TO PUNCTURE OR CUT.: Brand: BARD-PARKER SAFETY BLADES SIZE 15, STERILE

## (undated) DEVICE — SYRINGE MED 20ML STD CLR PLAS LUERLOCK TIP N CTRL DISP

## (undated) DEVICE — BLADE SURG NO15 S STL STR DISP GLASSVAN

## (undated) DEVICE — SUTURE PERMAHAND SZ 0 L30IN NONABSORBABLE BLK SILK BRAID A306H

## (undated) DEVICE — PACKING 8004000 NEURAY 200PK 13X13MM: Brand: NEURAY ®

## (undated) DEVICE — SUTURE VCRL SZ 3-0 L18IN ABSRB UD CP-2 L26MM 1/2 CIR REV J761D

## (undated) DEVICE — SUTURE VCRL SZ 2-0 L18IN ABSRB UD L26MM CP-2 1/2 CIR REV J762D

## (undated) DEVICE — INTENDED FOR TISSUE SEPARATION, AND OTHER PROCEDURES THAT REQUIRE A SHARP SURGICAL BLADE TO PUNCTURE OR CUT.: Brand: BARD-PARKER ® STAINLESS STEEL BLADES

## (undated) DEVICE — DRAPE TWL SURG 16X26IN BLU ORB04] ALLCARE INC]

## (undated) DEVICE — INTENDED USED TO PROTECT, TAG AND HELP LOCATED SUTURES DURING SURGERY: Brand: STERION®SUTURE AID BOOTIES

## (undated) DEVICE — 3M™ IOBAN™ 2 ANTIMICROBIAL INCISE DRAPE 6651EZ: Brand: IOBAN™ 2

## (undated) DEVICE — BLADE CLIPPER SURG SENSICLIP

## (undated) DEVICE — 6.0MM ACORN

## (undated) DEVICE — SKIN MARKER,REGULAR TIP WITH RULER AND LABELS: Brand: DEVON

## (undated) DEVICE — FLOSEAL HEMOSTATIC MATRIX, 5 ML: Brand: FLOSEAL

## (undated) DEVICE — (D)ADHESIVE TISS HI VISC 1ML -- DISC USE ITEM 346585

## (undated) DEVICE — AMD ANTIMICROBIAL NON-ADHERENT PAD,0.2% POLYHEXAMETHYLENE BIGUANIDE HCI (PHMB): Brand: TELFA

## (undated) DEVICE — TIP ASSY 9731116 PACKAGED- PCI

## (undated) DEVICE — ADPT INTERMEDIC LL 16G --

## (undated) DEVICE — SUTURE VCRL SZ 4-0 L27IN ABSRB UD L19MM PS-2 3/8 CIR PRIM J426H

## (undated) DEVICE — SUT ETHLN 3-0 18IN PS2 BLK --

## (undated) DEVICE — BUTTON SWITCH PENCIL BLADE ELECTRODE, HOLSTER: Brand: EDGE

## (undated) DEVICE — MEDI-VAC NON-CONDUCTIVE SUCTION TUBING: Brand: CARDINAL HEALTH

## (undated) DEVICE — CARBIDE MATCH HEAD

## (undated) DEVICE — PACK PROCEDURE SURG POST LAMINECTOMY CDS

## (undated) DEVICE — MAYFIELD® DISPOSABLE ADULT SKULL PIN (PLASTIC BASE): Brand: MAYFIELD®

## (undated) DEVICE — DRAPE SHT 3 QTR PROXIMA 53X77 --

## (undated) DEVICE — UNIVERSAL DRAPES: Brand: MEDLINE INDUSTRIES, INC.

## (undated) DEVICE — GOWN,REINF,POLY,ECL,PP SLV,XL: Brand: MEDLINE

## (undated) DEVICE — NEEDLE HYPO 25GA L1.5IN BLU POLYPR HUB S STL REG BVL STR

## (undated) DEVICE — 3M™ IOBAN™ 2 ANTIMICROBIAL INCISE DRAPE 6650EZ: Brand: IOBAN™ 2

## (undated) DEVICE — SUTURE VCRL SZ 2-0 L27IN ABSRB UD L26MM CT-2 1/2 CIR J269H

## (undated) DEVICE — ABSORBENT, WATERPROOF, BACTERIA PROOF FILM DRESSING: Brand: OPSITE POST OP 9.5X8.5CM CTN 20

## (undated) DEVICE — INTENDED FOR TISSUE SEPARATION, AND OTHER PROCEDURES THAT REQUIRE A SHARP SURGICAL BLADE TO PUNCTURE OR CUT.: Brand: BARD-PARKER SAFETY BLADES SIZE 10, STERILE

## (undated) DEVICE — SUT ETHLN 4-0 18IN PS2 BLK --

## (undated) DEVICE — GOWN,SIRUS,NONRNF,SETINSLV,XL,20/CS: Brand: MEDLINE

## (undated) DEVICE — ZYPHR DISPOSABLE CRANIAL PERFORATOR, LARGE 14/11MM: Brand: ZYPHR

## (undated) DEVICE — DRSG PATCH ANTIMIC 1INX4.0MM -- CONVERT TO ITEM 356053

## (undated) DEVICE — BIPOLAR FORCEPS CORD: Brand: VALLEYLAB

## (undated) DEVICE — GOWN,REINFORCED,POLY,AURORA,XXLARGE,STR: Brand: MEDLINE

## (undated) DEVICE — SUTURE MCRYL SZ 4-0 L27IN ABSRB UD L19MM PS-2 1/2 CIR PRIM Y426H

## (undated) DEVICE — GOWN,PREVENTION PLUS,2XL,ST,22/CS: Brand: MEDLINE

## (undated) DEVICE — CARDINAL HEALTH FLEXIBLE LIGHT HANDLE COVER: Brand: CARDINAL HEALTH

## (undated) DEVICE — KERLIX BANDAGE ROLL: Brand: KERLIX

## (undated) DEVICE — BLADE ASSEMB CLP HAIR COARSE --

## (undated) DEVICE — SURGIFOAM SPNG SZ 100

## (undated) DEVICE — X-RAY SPONGES,12 PLY: Brand: DERMACEA

## (undated) DEVICE — BAG DRNGE 4000ML CONT IRRIG ROUNDED TEARDROP SHP DISP